# Patient Record
Sex: FEMALE | Race: WHITE | HISPANIC OR LATINO | Employment: OTHER | URBAN - METROPOLITAN AREA
[De-identification: names, ages, dates, MRNs, and addresses within clinical notes are randomized per-mention and may not be internally consistent; named-entity substitution may affect disease eponyms.]

---

## 2017-02-06 ENCOUNTER — ALLSCRIPTS OFFICE VISIT (OUTPATIENT)
Dept: OTHER | Facility: OTHER | Age: 47
End: 2017-02-06

## 2017-05-11 ENCOUNTER — GENERIC CONVERSION - ENCOUNTER (OUTPATIENT)
Dept: OTHER | Facility: OTHER | Age: 47
End: 2017-05-11

## 2017-07-27 ENCOUNTER — GENERIC CONVERSION - ENCOUNTER (OUTPATIENT)
Dept: OTHER | Facility: OTHER | Age: 47
End: 2017-07-27

## 2017-08-09 ENCOUNTER — APPOINTMENT (OUTPATIENT)
Dept: LAB | Facility: HOSPITAL | Age: 47
End: 2017-08-09
Payer: COMMERCIAL

## 2017-08-09 ENCOUNTER — TRANSCRIBE ORDERS (OUTPATIENT)
Dept: ADMINISTRATIVE | Facility: HOSPITAL | Age: 47
End: 2017-08-09

## 2017-08-09 DIAGNOSIS — Z51.81 ENCOUNTER FOR THERAPEUTIC DRUG MONITORING: Primary | ICD-10-CM

## 2017-08-09 DIAGNOSIS — Z51.81 ENCOUNTER FOR THERAPEUTIC DRUG MONITORING: ICD-10-CM

## 2017-08-09 LAB
ALBUMIN SERPL BCP-MCNC: 3.4 G/DL (ref 3.5–5)
ALP SERPL-CCNC: 52 U/L (ref 46–116)
ALT SERPL W P-5'-P-CCNC: 28 U/L (ref 12–78)
ANION GAP SERPL CALCULATED.3IONS-SCNC: 6 MMOL/L (ref 4–13)
AST SERPL W P-5'-P-CCNC: 20 U/L (ref 5–45)
BASOPHILS # BLD AUTO: 0.02 THOUSANDS/ΜL (ref 0–0.1)
BASOPHILS NFR BLD AUTO: 1 % (ref 0–1)
BILIRUB SERPL-MCNC: 0.45 MG/DL (ref 0.2–1)
BUN SERPL-MCNC: 21 MG/DL (ref 5–25)
CALCIUM SERPL-MCNC: 9 MG/DL (ref 8.3–10.1)
CHLORIDE SERPL-SCNC: 105 MMOL/L (ref 100–108)
CHOLEST SERPL-MCNC: 211 MG/DL (ref 50–200)
CO2 SERPL-SCNC: 30 MMOL/L (ref 21–32)
CREAT SERPL-MCNC: 0.94 MG/DL (ref 0.6–1.3)
EOSINOPHIL # BLD AUTO: 0.06 THOUSAND/ΜL (ref 0–0.61)
EOSINOPHIL NFR BLD AUTO: 2 % (ref 0–6)
ERYTHROCYTE [DISTWIDTH] IN BLOOD BY AUTOMATED COUNT: 14.7 % (ref 11.6–15.1)
GFR SERPL CREATININE-BSD FRML MDRD: 71 ML/MIN/1.73SQ M
GLUCOSE P FAST SERPL-MCNC: 85 MG/DL (ref 65–99)
HCT VFR BLD AUTO: 35.5 % (ref 34.8–46.1)
HDLC SERPL-MCNC: 76 MG/DL (ref 40–60)
HGB BLD-MCNC: 11.5 G/DL (ref 11.5–15.4)
LDLC SERPL CALC-MCNC: 127 MG/DL (ref 0–100)
LYMPHOCYTES # BLD AUTO: 0.9 THOUSANDS/ΜL (ref 0.6–4.47)
LYMPHOCYTES NFR BLD AUTO: 30 % (ref 14–44)
MCH RBC QN AUTO: 27.4 PG (ref 26.8–34.3)
MCHC RBC AUTO-ENTMCNC: 32.4 G/DL (ref 31.4–37.4)
MCV RBC AUTO: 85 FL (ref 82–98)
MONOCYTES # BLD AUTO: 0.36 THOUSAND/ΜL (ref 0.17–1.22)
MONOCYTES NFR BLD AUTO: 12 % (ref 4–12)
NEUTROPHILS # BLD AUTO: 1.63 THOUSANDS/ΜL (ref 1.85–7.62)
NEUTS SEG NFR BLD AUTO: 55 % (ref 43–75)
NRBC BLD AUTO-RTO: 0 /100 WBCS
PLATELET # BLD AUTO: 161 THOUSANDS/UL (ref 149–390)
PMV BLD AUTO: 11.5 FL (ref 8.9–12.7)
POTASSIUM SERPL-SCNC: 3.7 MMOL/L (ref 3.5–5.3)
PROT SERPL-MCNC: 7.3 G/DL (ref 6.4–8.2)
RBC # BLD AUTO: 4.2 MILLION/UL (ref 3.81–5.12)
SODIUM SERPL-SCNC: 141 MMOL/L (ref 136–145)
T3 SERPL-MCNC: 1 NG/ML (ref 0.6–1.8)
T4 FREE SERPL-MCNC: 1.04 NG/DL (ref 0.76–1.46)
TRIGL SERPL-MCNC: 39 MG/DL
TSH SERPL DL<=0.05 MIU/L-ACNC: 1.72 UIU/ML (ref 0.36–3.74)
WBC # BLD AUTO: 2.97 THOUSAND/UL (ref 4.31–10.16)

## 2017-08-09 PROCEDURE — 84443 ASSAY THYROID STIM HORMONE: CPT

## 2017-08-09 PROCEDURE — 80053 COMPREHEN METABOLIC PANEL: CPT

## 2017-08-09 PROCEDURE — 84480 ASSAY TRIIODOTHYRONINE (T3): CPT

## 2017-08-09 PROCEDURE — 85025 COMPLETE CBC W/AUTO DIFF WBC: CPT

## 2017-08-09 PROCEDURE — 80061 LIPID PANEL: CPT

## 2017-08-09 PROCEDURE — 84439 ASSAY OF FREE THYROXINE: CPT

## 2017-08-09 PROCEDURE — 36415 COLL VENOUS BLD VENIPUNCTURE: CPT

## 2017-08-30 ENCOUNTER — GENERIC CONVERSION - ENCOUNTER (OUTPATIENT)
Dept: OTHER | Facility: OTHER | Age: 47
End: 2017-08-30

## 2017-08-30 DIAGNOSIS — E78.5 HYPERLIPIDEMIA: ICD-10-CM

## 2017-08-30 DIAGNOSIS — D72.819 DECREASED WHITE BLOOD CELL COUNT: ICD-10-CM

## 2017-09-12 ENCOUNTER — GENERIC CONVERSION - ENCOUNTER (OUTPATIENT)
Dept: OTHER | Facility: OTHER | Age: 47
End: 2017-09-12

## 2017-09-28 ENCOUNTER — ALLSCRIPTS OFFICE VISIT (OUTPATIENT)
Dept: OTHER | Facility: OTHER | Age: 47
End: 2017-09-28

## 2017-10-27 NOTE — CONSULTS
Assessment  1  Abnormal CBC (790 6) (R79 89)   2  Leukopenia (288 50) (D72 819)    Plan  Abnormal CBC    · Follow-up visit in 4 Months Evaluation and Treatment  Follow-up  Status: Complete   Done: 82GED1417 03:30PM   Ordered; For: Abnormal CBC; Ordered By: Roland Muñoz) Performed:  Due: 42RPT6752; Last Updated By: Terri Stinson; 9/28/2017 4:10:48 PM  Acute sinusitis    · Benzonatate 100 MG Oral Capsule (Tessalon Perles)   Rx By: Darrion Orourke; Dispense: 10 Days ; #:30 Capsule; Refill: 0;For: Acute sinusitis; MORENO = N; Sent To: [facility]; Last Updated By: Judi Cox); 9/28/2017 4:05:58 PM  Leukopenia    · (1) CBC/PLT/DIFF; Status:Active; Requested ZKA:33PCC7707;    Perform:[facility]; HBR:82AOD9970; Ordered; For:Leukopenia; Ordered By:Omari Chou);   · (1) FERRITIN; Status:Active; Requested ZQN:15DUX3572;    Perform:[facility]; VEJ:05IGB3996; Ordered; For:Leukopenia; Ordered By:Omari Chou);   · (1) VITAMIN B12; Status:Active; Requested KCZ:34HUL1775;    Perform:[facility]; MANISHA:31GZM6880; Ordered; For:Leukopenia; Ordered By:Omari Chou); Unlinked    · Topiramate 50 MG Oral Tablet   Rx By: Boris Gleason; Dispense: 0 Days ; #: Sufficient Tablet; Refill: 0; MORENO = N; Record; Last Updated By: Roland Muñoz); 9/28/2017 4:05:58 PM    Discussion/Summary    Leukopenia isolated, 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, however she has microcytosis might be related also to iron deficiency anemia or vitamin deficiencypatient recently had hysterectomy for fibroid of the uterus, I suggest multivitamin with iron 1 tab p  o  daily and repeat CBC in 4 month, reviewing the electronic records she had intermittent leukopenia previouslyneed for additional workup or bone marrow biopsy at this time        Chief Complaint  Leukopenia      History of Present Illness  38-year-old  female and with history of anxiety, palpitation, uterine fibroid, status post hysterectomy and [address] [place] [address] in August 2017, no evidence of malignancy, the patient was found to have leukopeniaAugust 2017 WBC 2 97, hemoglobin 11 5, MCV 85, RDW 14 7, platelets [id number], 86% neutrophils, 30% lymphocytesJuly 2016 WBC 4 1, hemoglobin 12 2, MCV 89, platelets 831 7, 01% neutrophils, 34% lymphocytesDecember 2014 WBC 3 7, hemoglobin 12 2, MCV 91, platelets [id number], 31% neutrophils, 30% lymphocytes, 19% monocyteshas normal CMP, TSHdoes not smoke or drinkhistory father with prostate cancer      Review of Systems    Constitutional: no fever,-- not feeling poorly-- and-- not feeling tired  Eyes: No complaints of eye pain, no red eyes, no eyesight problems, no discharge, no dry eyes, no itching of eyes  ENT: no complaints of earache, no loss of hearing, no nose bleeds, no nasal discharge, no sore throat, no hoarseness  Cardiovascular: the heart rate was not slow,-- no chest pain-- and-- no palpitations  Respiratory: No complaints of shortness of breath, no wheezing, no cough, no SOB on exertion, no orthopnea, no PND  Gastrointestinal: No complaints of abdominal pain, no constipation, no nausea or vomiting, no diarrhea, no bloody stools  Genitourinary: No complaints of dysuria, no incontinence, no pelvic pain, no dysmenorrhea, no vaginal discharge or bleeding  Musculoskeletal: no arthralgias,-- no joint swelling-- and-- no joint stiffness  Integumentary: No complaints of skin rash or lesions, no itching, no skin wounds, no breast pain or lump  Neurological: No complaints of headache, no confusion, no convulsions, no numbness, no dizziness or fainting, no tingling, no limb weakness, no difficulty walking  Psychiatric: Not suicidal, no sleep disturbance, no anxiety or depression, no change in personality, no emotional problems     Endocrine: No complaints of proptosis, no hot flashes, no muscle weakness, no deepening of the voice, no feelings of weakness  Hematologic/Lymphatic: no swollen glands,-- no tendency for easy bleeding-- and-- no swollen glands in the neck  ROS reviewed  Active Problems  1  Abdominal mass, LLQ (left lower quadrant) (789 34) (R19 04)   2  Abnormal CBC (790 6) (R79 89)   3  Abnormal uterine bleeding (AUB) (626 9) (N93 9)   4  Acute sinusitis (461 9) (J01 90)   5  Anxiety (300 00) (F41 9)   6  Chest tightness (786 59) (R07 89)   7  Chronic neck pain (723 1,338 29) (M54 2,G89 29)   8  Chronic upper back pain (724 5,338 29) (M54 9,G89 29)   9  Constipation (564 00) (K59 00)   10  Encounter for routine gynecological examination with Papanicolaou smear of cervix    (V72 31,V76 2) (Z01 419)   11  Epigastric pain (789 06) (R10 13)   12  Headache (784 0) (R51)   13  Heart palpitations (785 1) (R00 2)   14  Herpes simplex infection (054 9) (B00 9)   15  Hyperlipidemia (272 4) (E78 5)   16  Leukopenia (288 50) (D72 819)   17  Denied: History of Mental disorder   18  Metatarsalgia (726 70) (M77 40)   19  Mild vitamin D deficiency (268 9) (E55 9)   20  Nocturnal leg cramps (327 52) (G47 62)   21  Osteoarthritis (715 90) (M19 90)   22  Ovarian cyst, complex (620 2) (N83 299)   23  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)   24  Rectal bleeding (569 3) (K62 5)   25  Rectal pain (569 42) (K62 89)   26  Screening examination for pulmonary tuberculosis (V74 1) (Z11 1)   27  Sprain, neck (847 0) (S13 9XXA)   28  Urinary urgency (788 63) (R39 15)   29  Visit for screening mammogram (V76 12) (Z12 31)    Past Medical History  1  History of Ear itching (698 9) (L29 9)   2  History of candidal vulvovaginitis (V13 29) (Z86 19)   3  History of common cold (V12 09) (Z86 19)   4  History of hypertension (V12 59) (Z86 79)   5  Denied: History of Mental disorder   6  History of Toe pain (729 5) (M79 172)    The active problems and past medical history were reviewed and updated today  Surgical History  1   History of  Section Low Transverse   2  History of Laparoscopy With Total Hysterectomy   3  History of Laryngeal Surgery   4  History of Tubal Ligation    The surgical history was reviewed and updated today  Family History  Mother    1  Family history of hypertension (V17 49) (Z82 49)    The family history was reviewed and updated today  Social History   · Denied: History of Drug use   · Never a smoker   · No alcohol use   · No preference on Sikh beliefs  The social history was reviewed and updated today  Current Meds   1  Atorvastatin Calcium 20 MG Oral Tablet; TAKE 1 TABLET AT BEDTIME; Therapy: 77Eue7944 to (Evaluate:12Jal4076)  Requested for: 76Ufc2379; Last   Rx:87Xai5691 Ordered   2  Baclofen 10 MG Oral Tablet; TAKE 1 TABLET 3 TIMES DAILY AS NEEDED FOR MUSCLE   SPASM; Therapy: 71Lrg1425 to (Evaluate:40Zgi7716)  Requested for: 95Yut7620; Last   Rx:53Aom9246 Ordered   3  Benzonatate 100 MG Oral Capsule; TAKE 1 CAPSULE 3 TIMES DAILY AS NEEDED; Therapy: 13ECH5814 to (Evaluate:51Oun2575)  Requested for: 91TPC6210; Last   Rx:24Vfj2821 Ordered   4  Citalopram Hydrobromide 20 MG Oral Tablet; Therapy: 46Qqp9023 to Recorded   5  DocQLace 100 MG Oral Capsule; take 1 capsule by mouth twice a day if needed for   constipation; Therapy: 95Dop6727 to (Evaluate:13Xos9930)  Requested for: 47SRN5033; Last   Rx:92Xft8071 Ordered   6  Docusate Sodium 100 MG Oral Capsule; TAKE 1 CAPSULE TWICE DAILY as needed for   constipation; Therapy: 62BHB7358 to (Evaluate:12Jan2017)  Requested for: 35TZJ1556; Last   Rx:14Oct2016 Ordered   7  Omeprazole 20 MG Oral Capsule Delayed Release; TAKE 1 CAPSULE Daily; Therapy: 21IWN9153 to (Evaluate:78Yqe3816)  Requested for: 14Oct2016; Last   Rx:14Oct2016 Ordered   8  Oxybutynin Chloride ER 10 MG Oral Tablet Extended Release 24 Hour; Take 1 tablet   daily; Therapy: 25XYY0504 to (Evaluate:31Ifq9745)  Requested for: 32WWF6340; Last   Rx:48Njm2205 Ordered   9   Phentermine HCl - 37 5 MG Oral Capsule; Therapy: 66ZHE2819 to Recorded   10  RA Stool Softener 100 MG Oral Capsule; take 1 capsule by mouth twice a day if needed    for constipation; Therapy: 09KNY1046 to (Evaluate:23Apr2017)  Requested for: 53EQJ1873; Last    Rx:24Mar2017 Ordered   11  Topiramate 50 MG Oral Tablet; Therapy: 27VUJ1803 to Recorded   12  ValACYclovir HCl - 500 MG Oral Tablet; TAKE 1 TABLET TWICE DAILY; Therapy: 92IEG9199 to (Evaluate:02Jun2017)  Requested for: 92HIM5218; Last    Rx:34Ptp8770 Ordered   13  Vitamin D3 5000 UNIT Oral Capsule; TAKE 1 CAPSULE BY MOUTH EVERY DAY; Therapy: 12XVS4476 to (Evaluate:04Avk1405)  Requested for: 02Pan5920; Last    Rx:92Kec5843 Ordered    The medication list was reviewed and updated today  Allergies  1  No Known Drug Allergies  2  No Known Environmental Allergies   3  No Known Food Allergies    Vitals  Vital Signs    Recorded: 09FWE0486 03:56PM   Temperature 98 6 F   Heart Rate 60   Respiration 16   Height 5 ft 2 in   Weight 155 lb    BMI Calculated 28 35   BSA Calculated 1 72   O2 Saturation 98     Physical Exam    Constitutional   General appearance: No acute distress, well appearing and well nourished  Eyes   Conjunctiva and lids: No swelling, erythema or discharge  Pupils and irises: Equal, round and reactive to light  Ears, Nose, Mouth, and Throat   External inspection of ears and nose: Normal     Oropharynx: Normal with no erythema, edema, exudate or lesions  Pulmonary   Auscultation of lungs: Clear to auscultation  Cardiovascular   Auscultation of heart: Normal rate and rhythm, normal S1 and S2, without murmurs  Examination of extremities for edema and/or varicosities: Normal     Carotid pulses: Normal     Abdomen   Abdomen: Non-tender, no masses  Liver and spleen: No hepatomegaly or splenomegaly  Lymphatic   Palpation of lymph nodes in neck: No lymphadenopathy      Musculoskeletal   Gait and station: Normal     Inspection/palpation of joints, bones, and muscles: Normal     Skin   Skin and subcutaneous tissue: Normal without rashes or lesions  Neurologic   Cranial nerves: Cranial nerves 2-12 intact  Sensation: No sensory loss  Psychiatric   Orientation to person, place, and time: Normal     Mood and affect: Normal         ECOG 0       Results/Data  (1) CBC/PLT/DIFF 14QMT4904 08:28AM EPIC, Provider   Test ordered by: Ronnie Dye     Test Name Result Flag Reference   WBC COUNT 2 97 Thousand/uL L 4 31-10 16   RBC COUNT 4 20 Million/uL  3 81-5 12   HEMOGLOBIN 11 5 g/dL  11 5-15 4   HEMATOCRIT 35 5 %  34 8-46  1   MCV 85 fL  82-98   MCH 27 4 pg  26 8-34 3   MCHC 32 4 g/dL  31 4-37 4   RDW 14 7 %  11 6-15 1   MPV 11 5 fL  8 9-12 7   PLATELET COUNT 547 Thousands/uL  149-390   nRBC AUTOMATED 0 /100 WBCs     NEUTROPHILS RELATIVE PERCENT 55 %  43-75   LYMPHOCYTES RELATIVE PERCENT 30 %  14-44   MONOCYTES RELATIVE PERCENT 12 %  4-12   EOSINOPHILS RELATIVE PERCENT 2 %  0-6   BASOPHILS RELATIVE PERCENT 1 %  0-1   NEUTROPHILS ABSOLUTE COUNT 1 63 Thousands/? ??L L 1 85-7 62   LYMPHOCYTES ABSOLUTE COUNT 0 90 Thousands/? ??L  0 60-4 47   MONOCYTES ABSOLUTE COUNT 0 36 Thousand/? ??L  0 17-1 22   EOSINOPHILS ABSOLUTE COUNT 0 06 Thousand/? ??L  0 00-0 61   BASOPHILS ABSOLUTE COUNT 0 02 Thousands/? ??L  0 00-0 10   This bloodwork is non-fasting  Please drink two glasses of water morning of  bloodwork       (1) COMPREHENSIVE METABOLIC PANEL 12EQQ6744 44:37HP EPIC, Provider   Test ordered by: Ronnie Dye     Test Name Result Flag Reference   SODIUM 141 mmol/L  136-145   POTASSIUM 3 7 mmol/L  3 5-5 3   CHLORIDE 105 mmol/L  100-108   CARBON DIOXIDE 30 mmol/L  21-32   ANION GAP (CALC) 6 mmol/L  4-13   BLOOD UREA NITROGEN 21 mg/dL  5-25   CREATININE 0 94 mg/dL  0 60-1 30   Standardized to IDMS reference method   CALCIUM 9 0 mg/dL  8 3-10 1   BILI, TOTAL 0 45 mg/dL  0 20-1 00   ALK PHOSPHATAS 52 U/L     ALT (SGPT) 28 U/L  12-78   Specimen collection should occur prior to Sulfasalazine administration due to the potential for falsely depressed results  AST(SGOT) 20 U/L  5-45   Specimen collection should occur prior to Sulfasalazine administration due to the potential for falsely depressed results  ALBUMIN 3 4 g/dL L 3 5-5 0   TOTAL PROTEIN 7 3 g/dL  6 4-8 2   eGFR 71 ml/min/1 73sq m     National Kidney Disease Education Program recommendations are as follows:  GFR calculation is accurate only with a steady state creatinine  Chronic Kidney disease less than 60 ml/min/1 73 sq  meters  Kidney failure less than 15 ml/min/1 73 sq  meters  GLUCOSE FASTING 85 mg/dL  65-99   Specimen collection should occur prior to Sulfasalazine administration due to the potential for falsely depressed results  Specimen collection should occur prior to Sulfapyridine administration due to the potential for falsely elevated results  Future Appointments    Date/Time Provider Specialty Site   01/25/2018 03:30 PM JOCELINE Nunn  Hematology Oncology [facility]     Signatures   Electronically signed by :  JOCELINE Chandra ; Sep 28 2017  4:48PM EST                       (Author)

## 2018-01-12 NOTE — PROCEDURES
Procedures signed by Sincere Shook MD at 6/12/2016  2:43 PM       Author:  Sincere Shook MD Service:  Cardiology Author Type:  Physician     Filed:  6/12/2016  2:43 PM Date of Service:  6/9/2016  9:34 AM Status:  Signed     :  Sincere Shook MD (Physician)              [place], [place]  [id number]  [facility] MONITOR  06/06/2016    Ordering Physician  Liam Benz MD    Indication  Tachycardia  24 hour Holter     Predominant rhythm is sinus rhythm  Average heart rate 83 beats per   minute  Minimum heart rate of 60 beats per minute, maximum heart rate   of 126 beats per minute  There were rare isolated ventricular ectopic   beats noted  No ventricular tachycardia noted  There were rare   supraventricular ectopic beats noted  No supraventricular tachycardia   noted  No heart block or pauses exceeding 1 2 seconds  The patient had coughing on one occasion which correlated with sinus   rhythm at a rate of 84 beats per minute  se  [id number]  D:  06/09/2016 09:24:00  Dictated by:  Sincere Shook MD  T:  06/09/2016 09:34:24    CC:  Krishan Burton MD           Received Jennifer CLARK    Jun 12 2016  2:42PM [facility] Standard Time

## 2018-01-13 NOTE — PROGRESS NOTES
Plan  Abnormal uterine bleeding (AUB)    · Norethindrone 0 35 MG Oral Tablet; TAKE 1 TABLET DAILY   Rx By: Prieto Zaidi; Dispense: 28 Days ; #:1 X 28 Tablet Disp Pack; Refill: 3; For: Abnormal uterine bleeding (AUB); MORENO = N; Verified Transmission to [facility]  [facility] ANGEL [facility]; Last Updated By: System, [employer]; 2016 9:51:55 AM   · Urine HCG- POC; Status:Active - Perform Order; Requested for:49Qkz5826;    Perform: In Office; HNR:44KPZ0923;CTEMFNS; Today; For:Abnormal uterine bleeding (AUB); Ordered By:Francesca Saldaña [facility];  Urinary urgency    · Urine Dip Non-Automated- POC; Status:Active - Perform Order; Requested  for:00Flf3968;    Perform: In Office; [facility]68MNU6930[facility]; For:Urinary urgency; Ordered By:Francesca Saldaña [facility];    Discussion/Summary  Discussion Summary:   47 yo  presents for follow up of AUB  Unrelieved by home remedies  SIS done in office today did not demonstrate evidence of polyp    1  AUB / pelvic pain  - Noethindrone 0 35mg PO daily   - RTC 3 mo for reevalution    2  Urinary urgency - no evidence of infection, hx of "urethra surgeries"  - Follow up 1-2 wks in [address] [facility] [place]  Chief Complaint  Chief Complaint Free Text Note Form: Patient is c/o lower abdominal pain and heavy periods  History of Present Illness  HPI: 47 yo  presents for follow up of AUB    Patient reports her pain and bleeding have worsened over the last year  Reports that bleeding occurs every 28 days, lasts 10 days  Notes that during her menses, her bleeding is particularly heavy and she bleeds through 4-5 pads/day and finds herself staining clothes and sheets as it leaks  Reports she also gets pelvic pain, occurs most of the month and is not limited to when she has her menses  Pain is aching/cramping, bilateral lower quadrant  Also has burning sensation on labia, but this is similar to sensation when she is getting a herpes outbreak   Since last seeing us, she has tried a pineapple/cucumber/beet juice that is supposed to help as a diuretic  Notes that it did not help  Patient also notes urinary frequency  Notes she voids, but then needs to sit on toilet for several minutes because "some more needs to come out " Also notes urgency, and reports if she doesn't get to the bathroom right away she leaks a small amount  She notes previously she needed two procedures to "open her urethra " One occurred in [place], the other in [place]  Review of Systems   Female ROS: nonmenstrual bleeding, dysmenorrhea, menorrhagia, urinary frequency, feelings of urinary urgency, incomplete emptying of bladder and initiating urination requires straining, but no pelvic pain, no pelvic pressure, no vaginal pain, no vaginal discharge, no vaginal itching, no vaginal odor, no vulvar pain, no vulvar itching, periods are regular, no dysuria, no bladder pain, no hematuria, no burning sensation during urination, no flank pain and no abnormal urethral discharge  Focused-Female:   Constitutional: no fever and no chills  Cardiovascular: no chest pain and no palpitations  Respiratory: no shortness of breath  Gastrointestinal: abdominal pain, but no nausea, no vomiting, no constipation and no diarrhea  Genitourinary: dysmenorrhea and unexplained vaginal bleeding, but no dysuria, no pelvic pain and no vaginal discharge  ROS Reviewed:   ROS reviewed  Active Problems    1  Abdominal mass, LLQ (left lower quadrant) (789 34) (R19 04)   2  Abnormal uterine bleeding (AUB) (626 9) (N93 9)   3  Chest tightness (786 59) (R07 89)   4  Common cold (460) (J00)   5  Ear itching (698 9) (L29 9)   6  Encounter for routine gynecological examination with Papanicolaou smear of cervix   (V72 31,V76 2) (Z01 419)   7  Headache (784 0) (R51)   8  Heart palpitations (785 1) (R00 2)   9  Herpes simplex infection (054 9) (B00 9)   10  Hyperlipidemia (272 4) (E78 5)   11  Hypertension (401 9) (I10)   12  Denied: History of Mental disorder   13  Metatarsalgia (726 70) (M77 40)   14  Mild vitamin D deficiency (268 9) (E55 9)   15  Nocturnal leg cramps (327 52) (G47 62)   16  Osteoarthritis (715 90) (M19 90)   17  Ovarian cyst, complex (620 2) (N83 20)   18  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)   19  Screening examination for pulmonary tuberculosis (V74 1) (Z11 1)   20  Sprain, neck (847 0) (S13 9XXA)   21  Toe pain (729 5) (M79 676)   22  Visit for screening mammogram (V76 12) (Z12 31)    Past Medical History    1  Denied: History of Mental disorder  Active Problems And Past Medical History Reviewed: The active problems and past medical history were reviewed and updated today  Surgical History    1  History of  Section Low Transverse   2  History of Laryngeal Surgery   3  History of Tubal Ligation  Surgical History Reviewed: The surgical history was reviewed and updated today  Family History  Mother    1  Family history of hypertension (V17 49) (Z82 49)  Family History Reviewed: The family history was reviewed and updated today  Social History    · Denied: History of Drug use   · Never a smoker   · No preference on Confucianist beliefs  Social History Reviewed: The social history was reviewed and updated today  Current Meds   1  Omega-3 Fish Oil 1000 MG Oral Capsule; TAKE 1 CAPSULE DAILY; Therapy: 57Koa6856 to Recorded   2  ValACYclovir HCl - 500 MG Oral Tablet; TAKE 1 TABLET DAILY; Therapy: 61ULY7004 to (Evaluate:35Aqq7748)  Requested for: 75LNP0117; Last   Rx:30Lpv2165 Ordered   3  Vitamin D3 5000 UNIT Oral Capsule; TAKE 1 CAPSULE BY MOUTH EVERY DAY; Therapy: 25ZJJ1376 to 96 934734)  Requested for: 70LNQ7838; Last   Rx:46Oet6475 Ordered  Medication List Reviewed: The medication list was reviewed and updated today  Allergies    1   No Known Drug Allergies    Vitals  Vital Signs    Recorded: 14SXD3062 88:57AE   Systolic 641   Diastolic 79   LMP 03QMR5390   Weight 153 lb    BMI Calculated 27 98   BSA Calculated 1 71     Physical Exam    Constitutional   General appearance: No acute distress, well appearing and well nourished  Pulmonary   Respiratory effort: No increased work of breathing or signs of respiratory distress  Genitourinary   External genitalia: Normal and no lesions appreciated  normal labia majora and minora, no erythema  Vagina: Abnormal   scant blood in vaginal vault  Cervix: Normal, no palpable masses  no CMT, visually closed  Uterus: Normal, non-tender, not enlarged, and no palpable masses  small and mobile, normal in size and consistency  Adnexa/parametria: Normal, non-tender and no fullness or masses appreciated  Psychiatric   Orientation to person, place, and time: Normal     Mood and affect: Normal        Procedure    Procedure: Transvaginal Saline Hysterosonography (SIS)  The study was done today in the office  Indication: Heavy Vaginal Bleeding  Risks, benefits and alternatives were discussed with the patient  We discussed possible complications, including infection, bleeding and allergic reaction  Written consent was obtained prior to the procedure  Procedure Note:   Patient placed in dorsal lithotomy position with legs in stirrups  Cervix cleansed with betadine  Saline infusion catheter inserted into endometrial cavity  10cc syringe with sterile water connected to catheter  Approximately 2 - 8 cc infused into endometrial cavity  Findings:  smooth endometrium  Patient Status: the patient tolerated the procedure well  Complications:  there were no complications        Future Appointments    Date/Time Provider Specialty Site   11/23/2016 02:00 PM [facility], [address] [facility] [place]   12/08/2016 03:00 PM [facility], [address]     Signatures   Electronically signed by : JOCELINE Caro ; Aug 18 2016  3:31PM EST                       (Author)    Electronically signed by : Mona Holly MD; Sep  8 2016  9:45AM EST

## 2018-01-14 VITALS
DIASTOLIC BLOOD PRESSURE: 70 MMHG | HEART RATE: 76 BPM | BODY MASS INDEX: 30.03 KG/M2 | TEMPERATURE: 97.5 F | OXYGEN SATURATION: 99 % | RESPIRATION RATE: 18 BRPM | HEIGHT: 62 IN | WEIGHT: 163.19 LBS | SYSTOLIC BLOOD PRESSURE: 140 MMHG

## 2018-01-14 VITALS
HEART RATE: 60 BPM | HEIGHT: 62 IN | RESPIRATION RATE: 16 BRPM | OXYGEN SATURATION: 98 % | TEMPERATURE: 98.6 F | WEIGHT: 155 LBS | BODY MASS INDEX: 28.52 KG/M2

## 2018-01-15 NOTE — PROGRESS NOTES
Chief Complaint  Patient here for blood pressure check  Active Problems    1  Abdominal mass, LLQ (left lower quadrant) (789 34) (R19 04)   2  Abnormal uterine bleeding (AUB) (626 9) (N93 9)   3  Chest tightness (786 59) (R07 89)   4  Common cold (460) (J00)   5  Ear itching (698 9) (L29 9)   6  Encounter for routine gynecological examination with Papanicolaou smear of cervix   (V72 31,V76 2) (Z01 419)   7  Headache (784 0) (R51)   8  Heart palpitations (785 1) (R00 2)   9  Herpes simplex infection (054 9) (B00 9)   10  Hyperlipidemia (272 4) (E78 5)   11  Hypertension (401 9) (I10)   12  Denied: History of Mental disorder   13  Metatarsalgia (726 70) (M77 40)   14  Mild vitamin D deficiency (268 9) (E55 9)   15  Nocturnal leg cramps (327 52) (G47 62)   16  Osteoarthritis (715 90) (M19 90)   17  Ovarian cyst, complex (620 2) (N83 20)   18  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)   19  Screening examination for pulmonary tuberculosis (V74 1) (Z11 1)   20  Sprain, neck (847 0) (S13 9XXA)   21  Toe pain (729 5) (M79 676)   22  Visit for screening mammogram (V76 12) (Z12 31)    Current Meds   1  Omega-3 Fish Oil 1000 MG Oral Capsule; TAKE 1 CAPSULE DAILY; Therapy: 78Yvu1495 to Recorded   2  ValACYclovir HCl - 500 MG Oral Tablet; TAKE 1 TABLET DAILY; Therapy: 48MTN0655 to (Evaluate:24Hju2333)  Requested for: 68IAR6136; Last   Rx:95Qeq9821 Ordered   3  Vitamin D3 5000 UNIT Oral Capsule; TAKE 1 CAPSULE BY MOUTH EVERY DAY; Therapy: 31PVZ7650 to (Evaluate:57Irz1978)  Requested for: 06EQQ6379; Last   Rx:94Uvx1397 Ordered    Allergies    1  No Known Drug Allergies    Discussion/Summary    Patient was taking lisinopril-HCTZ, but discontinued it due to blood pressure getting so low  Blood pressure is stable today at 116/80 12:42pm  I advised pt to schedule a follow up as needed per Dr Charles Villanueva        Future Appointments    Date/Time Provider Specialty Site   08/18/2016 09:00 AM Ernestina Burton 171, Physician Schedule  [facility]     Signatures   Electronically signed by : Mk Diez, ; Aug  3 2016  1:00PM EST                       (Author)    Electronically signed by : JOCELINE Keen ; Aug  3 2016  1:09PM EST                       (Author)

## 2018-01-15 NOTE — RESULT NOTES
Verified Results  (1) COMPREHENSIVE METABOLIC PANEL [id number] 03:63ZN Johana Davies   National Kidney Disease Education Program recommendations are as follows:  GFR calculation is accurate only with a steady state creatinine  Chronic Kidney disease less than 60 ml/min/1 73 sq  meters  Kidney failure less than 15 ml/min/1 73 sq  meters  Test Name Result Flag Reference   GLUCOSE,RANDM 88 mg/dL     If the patient is fasting, the ADA then defines impaired fasting glucose as > 100 mg/dL and diabetes as > or equal to 123 mg/dL  SODIUM 140 mmol/L  136-145   POTASSIUM 3 7 mmol/L  3 5-5 3   CHLORIDE 103 mmol/L  100-108   CARBON DIOXIDE 33 mmol/L H 21-32   ANION GAP (CALC) 4 mmol/L  4-13   BLOOD UREA NITROGEN 15 mg/dL  5-25   CREATININE 0 91 mg/dL  0 60-1 30   Standardized to IDMS reference method   CALCIUM 9 1 mg/dL  8 3-10 1   BILI, TOTAL 0 83 mg/dL  0 20-1 00   ALK PHOSPHATAS 39 U/L L    ALT (SGPT) 29 U/L  12-78   AST(SGOT) 17 U/L  5-45   ALBUMIN 3 3 g/dL L 3 5-5 0   TOTAL PROTEIN 7 0 g/dL  6 4-8 2   eGFR Non-African American      >60 0 ml/min/1 73sq m     (1) LIPID PANEL, FASTING [id number] 07:49AM Johana Davies   Triglyceride:         Normal              <150 mg/dl       Borderline High    150-199 mg/dl       High               200-499 mg/dl       Very High          >499 mg/dl  Cholesterol:         Desirable        <200 mg/dl      Borderline High  200-239 mg/dl      High             >239 mg/dl  HDL Cholesterol:        High    >59 mg/dL      Low     <41 mg/dL     Test Name Result Flag Reference   CHOLESTEROL 221 mg/dL H    HDL,DIRECT 82 mg/dL H 40-60   Specimen collection should occur prior to Metamizole administration due to the potential for falsely depressed results  LDL CHOLESTEROL CALCULATED 129 mg/dL H 0-100   TRIGLYCERIDES 51 mg/dL  <=150   Specimen collection should occur prior to N-Acetylcysteine or Metamizole administration due to the potential for falsely depressed results       (1) TSH WITH FT4 REFLEX [id number] 07:49AM Johana Davies   Patients undergoing fluorescein dye angiography may retain small amounts of fluorescein in the body for 48-72 hours post procedure  Samples containing fluorescein can produce falsely depressed TSH values  If the patient had this procedure,a specimen should be resubmitted post fluorescein clearance          The recommended reference ranges for TSH during pregnancy are as follows:  First trimester 0 1 to 2 5 uIU/mL  Second trimester  0 2 to 3 0 uIU/mL  Third trimester 0 3 to 3 0 uIU/m     Test Name Result Flag Reference   TSH 2 367 uIU/mL  0 358-3 740     (1) VITAMIN D 25-HYDROXY [id number] 07:49AM Johana Davies     Test Name Result Flag Reference   VIT D 25-HYDROX 22 5 ng/mL L 30 0-100 0     (1) MICROALBUMIN CREATININE RATIO, RANDOM URINE [id number] 07:49AM Johana Davies     Test Name Result Flag Reference   MICROALBUMIN/ CREAT R 3 mg/g creatinine  0-30   MICROALBUMIN,URINE 9 2 mg/L  0 0-20 0   CREATININE URINE 297 0 mg/dL         Plan  Mild vitamin D deficiency    · Vitamin D3 2000 UNIT Oral Tablet   · Vitamin D3 5000 UNIT Oral Capsule; TAKE 1 CAPSULE BY MOUTH EVERY DAY

## 2018-01-17 NOTE — CONSULTS
Chief Complaint  Leukopenia      History of Present Illness  68-year-old  female and with history of anxiety, palpitation, uterine fibroid, status post hysterectomy and [address] [place] [address] in August 2017, no evidence of malignancy, the patient was found to have leukopenia  in August 2017 WBC 2 97, hemoglobin 11 5, MCV 85, RDW 14 7, platelets 199015, 91% neutrophils, 30% lymphocytes  In July 2016 WBC 4 1, hemoglobin 12 2, MCV 89, platelets 921 7, 51% neutrophils, 34% lymphocytes  In December 2014 WBC 3 7, hemoglobin 12 2, MCV 91, platelets 994286, 68% neutrophils, 30% lymphocytes, 19% monocytes  She has normal CMP, TSH  she does not smoke or drink  family history father with prostate cancer      Review of Systems    Constitutional: no fever, not feeling poorly and not feeling tired  Eyes: No complaints of eye pain, no red eyes, no eyesight problems, no discharge, no dry eyes, no itching of eyes  ENT: no complaints of earache, no loss of hearing, no nose bleeds, no nasal discharge, no sore throat, no hoarseness  Cardiovascular: the heart rate was not slow, no chest pain and no palpitations  Respiratory: No complaints of shortness of breath, no wheezing, no cough, no SOB on exertion, no orthopnea, no PND  Gastrointestinal: No complaints of abdominal pain, no constipation, no nausea or vomiting, no diarrhea, no bloody stools  Genitourinary: No complaints of dysuria, no incontinence, no pelvic pain, no dysmenorrhea, no vaginal discharge or bleeding  Musculoskeletal: no arthralgias, no joint swelling and no joint stiffness  Integumentary: No complaints of skin rash or lesions, no itching, no skin wounds, no breast pain or lump  Neurological: No complaints of headache, no confusion, no convulsions, no numbness, no dizziness or fainting, no tingling, no limb weakness, no difficulty walking     Psychiatric: Not suicidal, no sleep disturbance, no anxiety or depression, no change in personality, no emotional problems  Endocrine: No complaints of proptosis, no hot flashes, no muscle weakness, no deepening of the voice, no feelings of weakness  Hematologic/Lymphatic: no swollen glands, no tendency for easy bleeding and no swollen glands in the neck  ROS reviewed  Active Problems    1  Abdominal mass, LLQ (left lower quadrant) (789 34) (R19 04)   2  Abnormal CBC (790 6) (R79 89)   3  Abnormal uterine bleeding (AUB) (626 9) (N93 9)   4  Acute sinusitis (461 9) (J01 90)   5  Anxiety (300 00) (F41 9)   6  Chest tightness (786 59) (R07 89)   7  Chronic neck pain (723 1,338 29) (M54 2,G89 29)   8  Chronic upper back pain (724 5,338 29) (M54 9,G89 29)   9  Constipation (564 00) (K59 00)   10  Encounter for routine gynecological examination with Papanicolaou smear of cervix    (V72 31,V76 2) (Z01 419)   11  Epigastric pain (789 06) (R10 13)   12  Headache (784 0) (R51)   13  Heart palpitations (785 1) (R00 2)   14  Herpes simplex infection (054 9) (B00 9)   15  Hyperlipidemia (272 4) (E78 5)   16  Leukopenia (288 50) (D72 819)   17  Denied: History of Mental disorder   18  Metatarsalgia (726 70) (M77 40)   19  Mild vitamin D deficiency (268 9) (E55 9)   20  Nocturnal leg cramps (327 52) (G47 62)   21  Osteoarthritis (715 90) (M19 90)   22  Ovarian cyst, complex (620 2) (N83 299)   23  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)   24  Rectal bleeding (569 3) (K62 5)   25  Rectal pain (569 42) (K62 89)   26  Screening examination for pulmonary tuberculosis (V74 1) (Z11 1)   27  Sprain, neck (847 0) (S13 9XXA)   28  Urinary urgency (788 63) (R39 15)   29   Visit for screening mammogram (V76 12) (Z12 31)    Past Medical History    · History of Ear itching (698 9) (L29 9)   · History of candidal vulvovaginitis (V13 29) (Z86 19)   · History of common cold (V12 09) (Z86 19)   · History of hypertension (V12 59) (Z86 79)   · Denied: History of Mental disorder   · History of Toe pain (729 5) (L37 103)    The active problems and past medical history were reviewed and updated today  Surgical History    · History of  Section Low Transverse   · History of Laparoscopy With Total Hysterectomy   · History of Laryngeal Surgery   · History of Tubal Ligation    The surgical history was reviewed and updated today  Family History    · Family history of hypertension (V17 49) (Z82 49)    The family history was reviewed and updated today  Social History    · Denied: History of Drug use   · Never a smoker   · No alcohol use   · No preference on [employer] beliefs  The social history was reviewed and updated today  Current Meds   1  Atorvastatin Calcium 20 MG Oral Tablet; TAKE 1 TABLET AT BEDTIME; Therapy: 32Xbc3930 to (Evaluate:36Mvb1620)  Requested for: 31Rpo9218; Last   Rx:61Ubj4547 Ordered   2  Baclofen 10 MG Oral Tablet; TAKE 1 TABLET 3 TIMES DAILY AS NEEDED FOR MUSCLE   SPASM; Therapy: 22Rbw6757 to (Evaluate:50Kka2765)  Requested for: 84Frv5017; Last   Rx:61Ynm0426 Ordered   3  Benzonatate 100 MG Oral Capsule; TAKE 1 CAPSULE 3 TIMES DAILY AS NEEDED; Therapy: 18BXQ3429 to (Evaluate:12Wbq7287)  Requested for: 79SER8506; Last   Rx:51Sno7851 Ordered   4  Citalopram Hydrobromide 20 MG Oral Tablet; Therapy: 13Uqn2986 to Recorded   5  DocQLace 100 MG Oral Capsule; take 1 capsule by mouth twice a day if needed for   constipation; Therapy: 02Cns8966 to (Evaluate:67Nod9678)  Requested for: 52XUC7561; Last   Rx:12Ftl3131 Ordered   6  Docusate Sodium 100 MG Oral Capsule; TAKE 1 CAPSULE TWICE DAILY as needed for   constipation; Therapy: 16GZE2544 to (Evaluate:2017)  Requested for: 16PLO1150; Last   Rx:21Mjj4604 Ordered   7  Omeprazole 20 MG Oral Capsule Delayed Release; TAKE 1 CAPSULE Daily; Therapy: 03OLB5595 to (Evaluate:55Cdm1418)  Requested for: 2016; Last   Rx:2016 Ordered   8  Oxybutynin Chloride ER 10 MG Oral Tablet Extended Release 24 Hour;  Take 1 tablet daily; Therapy: 79RRO8770 to (Evaluate:93Nsk6104)  Requested for: 21BMP9174; Last   Rx:08Mlb9625 Ordered   9  Phentermine HCl - 37 5 MG Oral Capsule; Therapy: 87EYT3795 to Recorded   10  RA Stool Softener 100 MG Oral Capsule; take 1 capsule by mouth twice a day if needed    for constipation; Therapy: 32WLR5132 to (Evaluate:23Apr2017)  Requested for: 05FIU8533; Last    Rx:69Api5644 Ordered   11  Topiramate 50 MG Oral Tablet; Therapy: 01KIR1899 to Recorded   12  ValACYclovir HCl - 500 MG Oral Tablet; TAKE 1 TABLET TWICE DAILY; Therapy: 78ABQ3349 to (Evaluate:02Jun2017)  Requested for: 93YRU8528; Last    Rx:33Yqf0221 Ordered   13  Vitamin D3 5000 UNIT Oral Capsule; TAKE 1 CAPSULE BY MOUTH EVERY DAY; Therapy: 45IVP1593 to (Evaluate:11Fwi2177)  Requested for: 06Aee5253; Last    Rx:47Wxy6279 Ordered    The medication list was reviewed and updated today  Allergies    1  No Known Drug Allergies    2  No Known Environmental Allergies   3  No Known Food Allergies    Vitals   Recorded: 75RZP6800 03:56PM   Temperature 98 6 F   Heart Rate 60   Respiration 16   Height 5 ft 2 in   Weight 155 lb    BMI Calculated 28 35   BSA Calculated 1 72   O2 Saturation 98     Physical Exam    Constitutional   General appearance: No acute distress, well appearing and well nourished  Eyes   Conjunctiva and lids: No swelling, erythema or discharge  Pupils and irises: Equal, round and reactive to light  Ears, Nose, Mouth, and Throat   External inspection of ears and nose: Normal     Oropharynx: Normal with no erythema, edema, exudate or lesions  Pulmonary   Auscultation of lungs: Clear to auscultation  Cardiovascular   Auscultation of heart: Normal rate and rhythm, normal S1 and S2, without murmurs  Examination of extremities for edema and/or varicosities: Normal     Carotid pulses: Normal     Abdomen   Abdomen: Non-tender, no masses  Liver and spleen: No hepatomegaly or splenomegaly      Lymphatic Palpation of lymph nodes in neck: No lymphadenopathy  Musculoskeletal   Gait and station: Normal     Inspection/palpation of joints, bones, and muscles: Normal     Skin   Skin and subcutaneous tissue: Normal without rashes or lesions  Neurologic   Cranial nerves: Cranial nerves 2-12 intact  Sensation: No sensory loss  Psychiatric   Orientation to person, place, and time: Normal     Mood and affect: Normal         ECOG 0       Results/Data  (1) CBC/PLT/DIFF [id number] 08:28AM EPIC, Provider   Test ordered by: Jorge Frey     Test Name Result Flag Reference   WBC COUNT 2 97 Thousand/uL L 4 31-10 16   RBC COUNT 4 20 Million/uL  3 81-5 12   HEMOGLOBIN 11 5 g/dL  11 5-15 4   HEMATOCRIT 35 5 %  34 8-46  1   MCV 85 fL  82-98   MCH 27 4 pg  26 8-34 3   MCHC 32 4 g/dL  31 4-37 4   RDW 14 7 %  11 6-15 1   MPV 11 5 fL  8 9-12 7   PLATELET COUNT 647 Thousands/uL  149-390   nRBC AUTOMATED 0 /100 WBCs     NEUTROPHILS RELATIVE PERCENT 55 %  43-75   LYMPHOCYTES RELATIVE PERCENT 30 %  14-44   MONOCYTES RELATIVE PERCENT 12 %  4-12   EOSINOPHILS RELATIVE PERCENT 2 %  0-6   BASOPHILS RELATIVE PERCENT 1 %  0-1   NEUTROPHILS ABSOLUTE COUNT 1 63 Thousands/? ??L L 1 85-7 62   LYMPHOCYTES ABSOLUTE COUNT 0 90 Thousands/? ??L  0 60-4 47   MONOCYTES ABSOLUTE COUNT 0 36 Thousand/? ??L  0 17-1 22   EOSINOPHILS ABSOLUTE COUNT 0 06 Thousand/? ??L  0 00-0 61   BASOPHILS ABSOLUTE COUNT 0 02 Thousands/? ??L  0 00-0 10   This bloodwork is non-fasting  Please drink two glasses of water morning of  bloodwork       (1) COMPREHENSIVE METABOLIC PANEL [id number] 76:92QH EPIC, Provider   Test ordered by: Jorge Frey     Test Name Result Flag Reference   SODIUM 141 mmol/L  136-145   POTASSIUM 3 7 mmol/L  3 5-5 3   CHLORIDE 105 mmol/L  100-108   CARBON DIOXIDE 30 mmol/L  21-32   ANION GAP (CALC) 6 mmol/L  4-13   BLOOD UREA NITROGEN 21 mg/dL  5-25   CREATININE 0 94 mg/dL  0 60-1 30   Standardized to IDMS reference method   CALCIUM 9 0 mg/dL 8  3-10 1   BILI, TOTAL 0 45 mg/dL  0 20-1 00   ALK PHOSPHATAS 52 U/L     ALT (SGPT) 28 U/L  12-78   Specimen collection should occur prior to Sulfasalazine administration due to the potential for falsely depressed results  AST(SGOT) 20 U/L  5-45   Specimen collection should occur prior to Sulfasalazine administration due to the potential for falsely depressed results  ALBUMIN 3 4 g/dL L 3 5-5 0   TOTAL PROTEIN 7 3 g/dL  6 4-8 2   eGFR 71 ml/min/1 73sq m     National Kidney Disease Education Program recommendations are as follows:  GFR calculation is accurate only with a steady state creatinine  Chronic Kidney disease less than 60 ml/min/1 73 sq  meters  Kidney failure less than 15 ml/min/1 73 sq  meters  GLUCOSE FASTING 85 mg/dL  65-99   Specimen collection should occur prior to Sulfasalazine administration due to the potential for falsely depressed results  Specimen collection should occur prior to Sulfapyridine administration due to the potential for falsely elevated results  Assessment    1  Abnormal CBC (790 6) (R79 89)   2  Leukopenia (288 50) (D72 819)    Plan  Abnormal CBC    · Follow-up visit in 4 Months Evaluation and Treatment  Follow-up  Status: Complete   Done: 19UUJ5207 03:30PM   Ordered; For: Abnormal CBC; Ordered By: Amna Mcdonnell) Performed:  Due: 10MPV3007; Last Updated By: Srinivas Arevalo; 9/28/2017 4:10:48 PM  Acute sinusitis    · Benzonatate 100 MG Oral Capsule (Tessalon Perles)   Rx By: Darrion Orourke; Dispense: 10 Days ; #:30 Capsule; Refill: 0; For: Acute sinusitis; MORENO = N; Sent To: [facility]; Last Updated By: Ino Casey); 9/28/2017 4:05:58 PM  Leukopenia    · (1) CBC/PLT/DIFF; Status:Active; Requested DPB:[id number];    Perform:[facility]; LMS:[id number]; Ordered; For:Leukopenia; Ordered By:Edgar Chou);   · (1) FERRITIN; Status:Active; Requested WLR:75WFS0985;    Perform:[facility]; WWQ:69YSP2938; Ordered; For:Leukopenia; Ordered By:Pippa Chou);   · (1) VITAMIN B12; Status:Active; Requested RYW:46XFT2685;    Perform:[facility]; LDC:26KGA6089; Ordered; For:Leukopenia; Ordered By:Pippa Chou); Unlinked    · Topiramate 50 MG Oral Tablet   Rx By: Manny Miller; Dispense: 0 Days ; #: Sufficient Tablet; Refill: 0; MORENO = N; Record; Last Updated By: Eri Albert); 9/28/2017 4:05:58 PM    Discussion/Summary    Leukopenia isolated, 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

## 2018-01-22 ENCOUNTER — TRANSCRIBE ORDERS (OUTPATIENT)
Dept: ADMINISTRATIVE | Facility: HOSPITAL | Age: 48
End: 2018-01-22

## 2018-01-22 ENCOUNTER — APPOINTMENT (OUTPATIENT)
Dept: LAB | Facility: HOSPITAL | Age: 48
End: 2018-01-22
Attending: INTERNAL MEDICINE
Payer: COMMERCIAL

## 2018-01-22 VITALS
DIASTOLIC BLOOD PRESSURE: 80 MMHG | TEMPERATURE: 97.5 F | OXYGEN SATURATION: 100 % | SYSTOLIC BLOOD PRESSURE: 110 MMHG | HEIGHT: 62 IN | BODY MASS INDEX: 27.79 KG/M2 | HEART RATE: 70 BPM | RESPIRATION RATE: 18 BRPM | WEIGHT: 151 LBS

## 2018-01-22 VITALS — BODY MASS INDEX: 28.53 KG/M2 | WEIGHT: 156 LBS | SYSTOLIC BLOOD PRESSURE: 123 MMHG | DIASTOLIC BLOOD PRESSURE: 83 MMHG

## 2018-01-22 DIAGNOSIS — D72.819 DECREASED WHITE BLOOD CELL COUNT: ICD-10-CM

## 2018-01-22 LAB
BASOPHILS # BLD AUTO: 0.01 THOUSANDS/ΜL (ref 0–0.1)
BASOPHILS NFR BLD AUTO: 0 % (ref 0–1)
EOSINOPHIL # BLD AUTO: 0.1 THOUSAND/ΜL (ref 0–0.61)
EOSINOPHIL NFR BLD AUTO: 3 % (ref 0–6)
ERYTHROCYTE [DISTWIDTH] IN BLOOD BY AUTOMATED COUNT: 14.6 % (ref 11.6–15.1)
FERRITIN SERPL-MCNC: 7 NG/ML (ref 8–388)
HCT VFR BLD AUTO: 38 % (ref 34.8–46.1)
HGB BLD-MCNC: 12.3 G/DL (ref 11.5–15.4)
LYMPHOCYTES # BLD AUTO: 1.17 THOUSANDS/ΜL (ref 0.6–4.47)
LYMPHOCYTES NFR BLD AUTO: 30 % (ref 14–44)
MCH RBC QN AUTO: 29 PG (ref 26.8–34.3)
MCHC RBC AUTO-ENTMCNC: 32.4 G/DL (ref 31.4–37.4)
MCV RBC AUTO: 90 FL (ref 82–98)
MONOCYTES # BLD AUTO: 0.61 THOUSAND/ΜL (ref 0.17–1.22)
MONOCYTES NFR BLD AUTO: 15 % (ref 4–12)
NEUTROPHILS # BLD AUTO: 2.08 THOUSANDS/ΜL (ref 1.85–7.62)
NEUTS SEG NFR BLD AUTO: 52 % (ref 43–75)
NRBC BLD AUTO-RTO: 0 /100 WBCS
PLATELET # BLD AUTO: 136 THOUSANDS/UL (ref 149–390)
PMV BLD AUTO: 12.3 FL (ref 8.9–12.7)
RBC # BLD AUTO: 4.24 MILLION/UL (ref 3.81–5.12)
VIT B12 SERPL-MCNC: 727 PG/ML (ref 100–900)
WBC # BLD AUTO: 3.97 THOUSAND/UL (ref 4.31–10.16)

## 2018-01-22 PROCEDURE — 36415 COLL VENOUS BLD VENIPUNCTURE: CPT

## 2018-01-22 PROCEDURE — 85025 COMPLETE CBC W/AUTO DIFF WBC: CPT

## 2018-01-22 PROCEDURE — 82607 VITAMIN B-12: CPT

## 2018-01-22 PROCEDURE — 82728 ASSAY OF FERRITIN: CPT

## 2018-03-14 DIAGNOSIS — R39.15 URINARY URGENCY: Primary | ICD-10-CM

## 2018-03-15 ENCOUNTER — OFFICE VISIT (OUTPATIENT)
Dept: HEMATOLOGY ONCOLOGY | Facility: CLINIC | Age: 48
End: 2018-03-15
Payer: COMMERCIAL

## 2018-03-15 VITALS
HEART RATE: 78 BPM | BODY MASS INDEX: 28.68 KG/M2 | TEMPERATURE: 97.2 F | WEIGHT: 168 LBS | RESPIRATION RATE: 16 BRPM | OXYGEN SATURATION: 97 % | HEIGHT: 64 IN | SYSTOLIC BLOOD PRESSURE: 132 MMHG | DIASTOLIC BLOOD PRESSURE: 72 MMHG

## 2018-03-15 DIAGNOSIS — D69.6 THROMBOCYTOPENIA (HCC): Primary | ICD-10-CM

## 2018-03-15 DIAGNOSIS — D50.0 IRON DEFICIENCY ANEMIA DUE TO CHRONIC BLOOD LOSS: ICD-10-CM

## 2018-03-15 PROCEDURE — 99214 OFFICE O/P EST MOD 30 MIN: CPT | Performed by: INTERNAL MEDICINE

## 2018-03-15 RX ORDER — CITALOPRAM 20 MG/1
TABLET ORAL
COMMUNITY
Start: 2017-08-30 | End: 2020-09-08

## 2018-03-15 RX ORDER — PANTOPRAZOLE SODIUM 40 MG/1
40 TABLET, DELAYED RELEASE ORAL
COMMUNITY
Start: 2018-02-22 | End: 2018-10-08

## 2018-03-15 RX ORDER — CHOLECALCIFEROL (VITAMIN D3) 125 MCG
CAPSULE ORAL
COMMUNITY
Start: 2018-02-20 | End: 2018-10-09 | Stop reason: SDUPTHER

## 2018-03-15 RX ORDER — OMEPRAZOLE 10 MG/1
1 CAPSULE, DELAYED RELEASE ORAL DAILY
COMMUNITY
Start: 2016-10-14 | End: 2018-10-08

## 2018-03-15 RX ORDER — ACYCLOVIR 400 MG/1
400 TABLET ORAL
COMMUNITY
Start: 2012-10-12 | End: 2019-11-17 | Stop reason: ALTCHOICE

## 2018-03-15 NOTE — PROGRESS NOTES
Hematology Outpatient Follow - Up Note  [place] Mate 46 y o  female MRN: @ Encounter: [id number]        Date:  3/15/2018        Assessment/ Plan:   Low ferritin most likely secondary to previous hysterectomy, gastritis, intermittent hemorrhoids, the patient did not take prenatal vitamin, I told her she has to take 1 tablet daily it has a high component of iron  2  Right upper quadrant pain, ultrasound of the abdomen is ordered  3  Omeprazole 20 mg p  O  Daily  4  Thrombocytopenia, continue watchful observation and follow-up in 6 months CBC, iron studies           HPI:  70-year-old  female who was seen here in September 2017 with history of anxiety, palpitation, uterine fibroid, status post hysterectomy there was no evidence of malignancy, she was found to have leukopenia with WBC of 2 97 in August 2017, hemoglobin 11 5, MCV 85, platelets 026622, 91% neutrophils, 30% lymphocytes   In July 2016 WBC 4 1, hemoglobin 12 2, MCV 89  In December 2014 WBC 3 7, hemoglobin 12 2, MCV 91, platelets 916088   The patient was found to have low ferritin most likely secondary to menses prior to hysterectomy, she was told to take multivitamin with iron ( prenatal) 1 tab p  O  Daily, she did not do so she came today for follow-up, she was diagnosed with gastritis, hemorrhoids she had EGD and colonoscopy at [facility], there was no evidence of malignancy       Interval History:        Previous Treatment:         Test Results:    Imaging: No results found      Labs:   Lab Results   Component Value Date    WBC 3 97 (L) 01/22/2018    HGB 12 3 01/22/2018    HCT 38 0 01/22/2018    MCV 90 01/22/2018     (L) 01/22/2018     Lab Results   Component Value Date     08/09/2017    K 3 7 08/09/2017     08/09/2017    CO2 30 08/09/2017    ANIONGAP 6 08/09/2017    BUN 21 08/09/2017    CREATININE 0 94 08/09/2017    GLUCOSE 85 08/02/2016    GLUF 85 08/09/2017    CALCIUM 9 0 08/09/2017    AST 20 08/09/2017    ALT 28 08/09/2017    ALKPHOS 52 08/09/2017    PROT 7 3 08/09/2017    BILITOT 0 45 08/09/2017    EGFR 71 08/09/2017       Lab Results   Component Value Date    FERRITIN 7 (L) 01/22/2018       Lab Results   Component Value Date    GEUYMLUV97 727 01/22/2018         ROS:   Review of Systems   Constitutional: Negative  HENT: Negative  Eyes: Negative  Respiratory: Negative  Cardiovascular: Negative  Gastrointestinal: Positive for abdominal pain (Especially in the right upper quadrant)  Negative for blood in stool, constipation, diarrhea, nausea and rectal pain  Endocrine: Negative  Genitourinary: Negative for difficulty urinating  Musculoskeletal: Negative  Skin: Negative  Neurological: Negative  Psychiatric/Behavioral: Negative  Current Medications: Reviewed  Allergies: Reviewed  PMH/FH/SH:  Reviewed      Physical Exam:    Body surface area is 1 82 meters squared  Wt Readings from Last 3 Encounters:   03/15/18 76 2 kg (168 lb)   09/28/17 70 3 kg (155 lb)   08/30/17 68 5 kg (151 lb)        Temp Readings from Last 3 Encounters:   03/15/18 (!) 97 2 °F (36 2 °C) (Tympanic)   09/28/17 98 6 °F (37 °C)   08/30/17 97 5 °F (36 4 °C) (Tympanic)        BP Readings from Last 3 Encounters:   03/15/18 132/72   08/30/17 110/80   05/11/17 123/83         Pulse Readings from Last 3 Encounters:   03/15/18 78   09/28/17 60   08/30/17 70        Physical Exam   Constitutional: She is oriented to person, place, and time  She appears well-developed and well-nourished  No distress  HENT:   Head: Normocephalic and atraumatic  Mouth/Throat: Oropharynx is clear and moist  No oropharyngeal exudate  Eyes: Conjunctivae and EOM are normal  Pupils are equal, round, and reactive to light  Neck: Normal range of motion  Neck supple  No tracheal deviation present  No thyromegaly present  Cardiovascular: Normal rate and regular rhythm  Exam reveals no gallop and no friction rub      No murmur heard   Pulmonary/Chest: Effort normal and breath sounds normal  No respiratory distress  She has no wheezes  She has no rales  She exhibits no tenderness  Abdominal: Soft  Bowel sounds are normal  She exhibits no distension and no mass  There is tenderness ( in the right upper quadrant, however no Buckley sign)  There is no rebound and no guarding  Musculoskeletal: Normal range of motion  Lymphadenopathy:     She has no cervical adenopathy  Neurological: She is alert and oriented to person, place, and time  Skin: Skin is warm and dry  No rash noted  She is not diaphoretic  No erythema  No pallor  Psychiatric: She has a normal mood and affect  Her behavior is normal  Judgment and thought content normal    Vitals reviewed  Goals and Barriers:  Current Goal: Minimize effects of disease  Barriers: None  Patient's Capacity to Self Care:  Patient is able to self care      Code Status: @[facility]@

## 2018-03-15 NOTE — LETTER
March 15, 2018     [facility]  [address]    Patient: Ellie Mejía   YOB: 1970   Date of Visit: 3/15/2018       Dear Dr Fitzpatrick Cover: Thank you for referring Ellie Mejía to me for evaluation  Below are my notes for this consultation  If you have questions, please do not hesitate to call me  I look forward to following your patient along with you  Sincerely,        Lisa Thakkar MD        CC: No Recipients  Lisa Thakkar MD  3/15/2018 10:10 AM  Sign at close encounter  Hematology Outpatient Follow - Up Note  Ellie Mejía 46 y o  female MRN: @ Encounter: [id number]        Date:  3/15/2018        Assessment/ Plan:   Low ferritin most likely secondary to previous hysterectomy, gastritis, intermittent hemorrhoids, the patient did not take prenatal vitamin, I told her she has to take 1 tablet daily it has a high component of iron  2  Right upper quadrant pain, ultrasound of the abdomen is ordered  3  Omeprazole 20 mg p  O  Daily  4  Thrombocytopenia, continue watchful observation and follow-up in 6 months CBC, iron studies           HPI:  52-year-old  female who was seen here in September 2017 with history of anxiety, palpitation, uterine fibroid, status post hysterectomy there was no evidence of malignancy, she was found to have leukopenia with WBC of 2 97 in August 2017, hemoglobin 11 5, MCV 85, platelets [id number], 93% neutrophils, 30% lymphocytes   In July 2016 WBC 4 1, hemoglobin 12 2, MCV 89  In December 2014 WBC 3 7, hemoglobin 12 2, MCV 91, platelets [id number]   The patient was found to have low ferritin most likely secondary to menses prior to hysterectomy, she was told to take multivitamin with iron ( prenatal) 1 tab p   O  Daily, she did not do so she came today for follow-up, she was diagnosed with gastritis, hemorrhoids she had EGD and colonoscopy at [facility], there was no evidence of malignancy       Interval History: Previous Treatment:         Test Results:    Imaging: No results found  Labs:   Lab Results   Component Value Date    WBC 3 97 (L) 01/22/2018    HGB 12 3 01/22/2018    HCT 38 0 01/22/2018    MCV 90 01/22/2018     (L) 01/22/2018     Lab Results   Component Value Date     08/09/2017    K 3 7 08/09/2017     08/09/2017    CO2 30 08/09/2017    ANIONGAP 6 08/09/2017    BUN 21 08/09/2017    CREATININE 0 94 08/09/2017    GLUCOSE 85 08/02/2016    GLUF 85 08/09/2017    CALCIUM 9 0 08/09/2017    AST 20 08/09/2017    ALT 28 08/09/2017    ALKPHOS 52 08/09/2017    PROT 7 3 08/09/2017    BILITOT 0 45 08/09/2017    EGFR 71 08/09/2017       Lab Results   Component Value Date    FERRITIN 7 (L) 01/22/2018       Lab Results   Component Value Date    OGFIFVEC38 727 01/22/2018         ROS:   Review of Systems   Constitutional: Negative  HENT: Negative  Eyes: Negative  Respiratory: Negative  Cardiovascular: Negative  Gastrointestinal: Positive for abdominal pain (Especially in the right upper quadrant)  Negative for blood in stool, constipation, diarrhea, nausea and rectal pain  Endocrine: Negative  Genitourinary: Negative for difficulty urinating  Musculoskeletal: Negative  Skin: Negative  Neurological: Negative  Psychiatric/Behavioral: Negative  Current Medications: Reviewed  Allergies: Reviewed  PMH/FH/SH:  Reviewed      Physical Exam:    Body surface area is 1 82 meters squared      Wt Readings from Last 3 Encounters:   03/15/18 76 2 kg (168 lb)   09/28/17 70 3 kg (155 lb)   08/30/17 68 5 kg (151 lb)        Temp Readings from Last 3 Encounters:   03/15/18 (!) 97 2 °F (36 2 °C) (Tympanic)   09/28/17 98 6 °F (37 °C)   08/30/17 97 5 °F (36 4 °C) (Tympanic)        BP Readings from Last 3 Encounters:   03/15/18 132/72   08/30/17 110/80   05/11/17 123/83         Pulse Readings from Last 3 Encounters:   03/15/18 78   09/28/17 60   08/30/17 70        Physical Exam Constitutional: She is oriented to person, place, and time  She appears well-developed and well-nourished  No distress  HENT:   Head: Normocephalic and atraumatic  Mouth/Throat: Oropharynx is clear and moist  No oropharyngeal exudate  Eyes: Conjunctivae and EOM are normal  Pupils are equal, round, and reactive to light  Neck: Normal range of motion  Neck supple  No tracheal deviation present  No thyromegaly present  Cardiovascular: Normal rate and regular rhythm  Exam reveals no gallop and no friction rub  No murmur heard  Pulmonary/Chest: Effort normal and breath sounds normal  No respiratory distress  She has no wheezes  She has no rales  She exhibits no tenderness  Abdominal: Soft  Bowel sounds are normal  She exhibits no distension and no mass  There is tenderness ( in the right upper quadrant, however no Buckley sign)  There is no rebound and no guarding  Musculoskeletal: Normal range of motion  Lymphadenopathy:     She has no cervical adenopathy  Neurological: She is alert and oriented to person, place, and time  Skin: Skin is warm and dry  No rash noted  She is not diaphoretic  No erythema  No pallor  Psychiatric: She has a normal mood and affect  Her behavior is normal  Judgment and thought content normal    Vitals reviewed  Goals and Barriers:  Current Goal: Minimize effects of disease  Barriers: None  Patient's Capacity to Self Care:  Patient is able to self care      Code Status: [unfilled]

## 2018-03-28 RX ORDER — OXYBUTYNIN CHLORIDE 5 MG/1
TABLET, EXTENDED RELEASE ORAL
Qty: 60 TABLET | Refills: 0 | Status: SHIPPED | OUTPATIENT
Start: 2018-03-28 | End: 2019-01-08

## 2018-05-15 DIAGNOSIS — B00.9 HSV INFECTION: Primary | ICD-10-CM

## 2018-05-15 RX ORDER — VALACYCLOVIR HYDROCHLORIDE 500 MG/1
TABLET, FILM COATED ORAL
Qty: 6 TABLET | Refills: 1 | Status: SHIPPED | OUTPATIENT
Start: 2018-05-15 | End: 2019-01-08

## 2018-06-11 DIAGNOSIS — R39.15 URINARY URGENCY: ICD-10-CM

## 2018-09-10 ENCOUNTER — HOSPITAL ENCOUNTER (OUTPATIENT)
Dept: ULTRASOUND IMAGING | Facility: HOSPITAL | Age: 48
Discharge: HOME | End: 2018-09-10
Attending: INTERNAL MEDICINE
Payer: COMMERCIAL

## 2018-09-10 ENCOUNTER — APPOINTMENT (OUTPATIENT)
Dept: LAB | Facility: HOSPITAL | Age: 48
End: 2018-09-10
Attending: INTERNAL MEDICINE
Payer: COMMERCIAL

## 2018-09-10 DIAGNOSIS — D69.6 THROMBOCYTOPENIA (HCC): ICD-10-CM

## 2018-09-10 DIAGNOSIS — D50.0 IRON DEFICIENCY ANEMIA DUE TO CHRONIC BLOOD LOSS: ICD-10-CM

## 2018-09-10 LAB
BASOPHILS # BLD AUTO: 0.03 THOUSANDS/ΜL (ref 0–0.1)
BASOPHILS NFR BLD AUTO: 1 % (ref 0–1)
EOSINOPHIL # BLD AUTO: 0.11 THOUSAND/ΜL (ref 0–0.61)
EOSINOPHIL NFR BLD AUTO: 2 % (ref 0–6)
ERYTHROCYTE [DISTWIDTH] IN BLOOD BY AUTOMATED COUNT: 12 % (ref 11.6–15.1)
FERRITIN SERPL-MCNC: 31 NG/ML (ref 8–388)
HCT VFR BLD AUTO: 41.3 % (ref 34.8–46.1)
HGB BLD-MCNC: 13.4 G/DL (ref 11.5–15.4)
IMM GRANULOCYTES # BLD AUTO: 0.01 THOUSAND/UL (ref 0–0.2)
IMM GRANULOCYTES NFR BLD AUTO: 0 % (ref 0–2)
LYMPHOCYTES # BLD AUTO: 1.61 THOUSANDS/ΜL (ref 0.6–4.47)
LYMPHOCYTES NFR BLD AUTO: 34 % (ref 14–44)
MCH RBC QN AUTO: 30.6 PG (ref 26.8–34.3)
MCHC RBC AUTO-ENTMCNC: 32.4 G/DL (ref 31.4–37.4)
MCV RBC AUTO: 94 FL (ref 82–98)
MONOCYTES # BLD AUTO: 0.52 THOUSAND/ΜL (ref 0.17–1.22)
MONOCYTES NFR BLD AUTO: 11 % (ref 4–12)
NEUTROPHILS # BLD AUTO: 2.49 THOUSANDS/ΜL (ref 1.85–7.62)
NEUTS SEG NFR BLD AUTO: 52 % (ref 43–75)
NRBC BLD AUTO-RTO: 0 /100 WBCS
PLATELET # BLD AUTO: 170 THOUSANDS/UL (ref 149–390)
PMV BLD AUTO: 12.1 FL (ref 8.9–12.7)
RBC # BLD AUTO: 4.38 MILLION/UL (ref 3.81–5.12)
WBC # BLD AUTO: 4.77 THOUSAND/UL (ref 4.31–10.16)

## 2018-09-10 PROCEDURE — 36415 COLL VENOUS BLD VENIPUNCTURE: CPT

## 2018-09-10 PROCEDURE — 82728 ASSAY OF FERRITIN: CPT

## 2018-09-10 PROCEDURE — 76700 US EXAM ABDOM COMPLETE: CPT

## 2018-09-10 PROCEDURE — 85025 COMPLETE CBC W/AUTO DIFF WBC: CPT

## 2018-09-20 ENCOUNTER — OFFICE VISIT (OUTPATIENT)
Dept: HEMATOLOGY ONCOLOGY | Facility: CLINIC | Age: 48
End: 2018-09-20
Payer: COMMERCIAL

## 2018-09-20 VITALS
BODY MASS INDEX: 29.71 KG/M2 | WEIGHT: 174 LBS | TEMPERATURE: 98.5 F | OXYGEN SATURATION: 98 % | HEIGHT: 64 IN | SYSTOLIC BLOOD PRESSURE: 121 MMHG | RESPIRATION RATE: 18 BRPM | HEART RATE: 68 BPM | DIASTOLIC BLOOD PRESSURE: 81 MMHG

## 2018-09-20 DIAGNOSIS — R79.0 LOW FERRITIN LEVEL: Primary | ICD-10-CM

## 2018-09-20 DIAGNOSIS — D72.819 LEUKOPENIA, UNSPECIFIED TYPE: ICD-10-CM

## 2018-09-20 PROCEDURE — 99214 OFFICE O/P EST MOD 30 MIN: CPT | Performed by: INTERNAL MEDICINE

## 2018-09-20 NOTE — LETTER
September 20, 2018     Alesha Quiroz MD  [address] [facility]  [address]    Patient: Jani Webb   YOB: 1970   Date of Visit: 9/20/2018       Dear Dr Fredrick Leon:    Thank you for referring Jani Webb to me for evaluation  Below are my notes for this consultation  If you have questions, please do not hesitate to call me  I look forward to following your patient along with you  Sincerely,        Sonia Hernandez MD        CC: No Recipients  Sonia Hernandez MD  9/20/2018  9:50 AM  Sign at close encounter  Hematology Outpatient Follow - Up Note  Jani Webb 46 y o  female MRN: @ Encounter: [id number]        Date:  9/20/2018        Assessment/ Plan:    low ferritin most likely secondary to previous hysterectomy, gastritis, intermittent hemorrhoids, she had EGD and colonoscopy showed gastritis, no evidence of malignancy, the patient is on prenatal 1 tab p  O  Daily, she has significant improvement in hemoglobin as well as ferritin  She will continue on omeprazole 20 mg p  O  Daily  Thrombocytopenia and leukopenia improved on multivitamin 1 tab p  O  Daily   I suggest CBC and differential in 6-12 months   Will be glad to see the patient again if needed           HPI:  53-year-old  female who was seen here in September 2017 with history of anxiety, palpitation, uterine fibroid, status post hysterectomy there was no evidence of malignancy, she was found to have leukopenia with WBC of 2 97 in August 2017, hemoglobin 11 5, MCV 85, platelets [id number], 65% neutrophils, 30% lymphocytes   In July 2016 WBC 4 1, hemoglobin 12 2, MCV 89  In December 2014 WBC 3 7, hemoglobin 12 2, MCV 91, platelets [id number]   The patient was found to have low ferritin most likely secondary to menses prior to hysterectomy, she was told to take multivitamin with iron ( prenatal) 1 tab p   O  Daily, she did not do so she came today for follow-up, she was diagnosed with gastritis, hemorrhoids she had EGD and colonoscopy at [facility], there was no evidence of malignancy, there was evidence of a gastritis, she was initiated on pantoprazole     Interval History:        Previous Treatment:         Test Results:    Imaging: Us Abdomen Complete    Result Date: 9/11/2018  Narrative: ABDOMEN ULTRASOUND, COMPLETE INDICATION:   D69 6: Thrombocytopenia, unspecified 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)  COMPARISON:  None TECHNIQUE:   Real-time ultrasound of the abdomen was performed with a curvilinear transducer with both volumetric sweeps and still imaging techniques  FINDINGS: PANCREAS:  Visualized portions of the pancreas are within normal limits  AORTA AND IVC:  Visualized portions are normal for patient age  LIVER: Size:  Within normal range  The liver measures 14 2 cm in the midclavicular line  Contour:  Surface contour is smooth  Parenchyma:  Echogenicity and echotexture are within normal limits  No evidence of suspicious mass  Limited imaging of the main portal vein shows it to be patent and hepatopetal  BILIARY: The gallbladder is normal in caliber  No wall thickening or pericholecystic fluid  No stones or sludge identified  No sonographic Buckley's sign  No intrahepatic biliary dilatation  CBD measures 5 mm  No choledocholithiasis  KIDNEY: Right kidney measures 10 x 4 3 cm  Within normal limits  Left kidney measures 10 3 x 5 4 cm  Within normal limits  SPLEEN: Measures 9 5 x 9 3 x 4 9 cm  Within normal limits  ASCITES:  None       Impression: Normal  Workstation performed: [id number]       Labs:   Lab Results   Component Value Date    WBC 4 77 09/10/2018    HGB 13 4 09/10/2018    HCT 41 3 09/10/2018    MCV 94 09/10/2018     09/10/2018     Lab Results   Component Value Date     08/09/2017    K 3 7 08/09/2017     08/09/2017    CO2 30 08/09/2017    ANIONGAP 7 12/19/2014    BUN 21 08/09/2017    CREATININE 0 94 08/09/2017 GLUCOSE 88 12/19/2014    GLUF 85 08/09/2017    CALCIUM 9 0 08/09/2017    AST 20 08/09/2017    ALT 28 08/09/2017    ALKPHOS 52 08/09/2017    PROT 7 0 12/19/2014    BILITOT 0 61 12/19/2014    EGFR 71 08/09/2017       Lab Results   Component Value Date    FERRITIN 31 09/10/2018       Lab Results   Component Value Date    BRHIZCOU12 336 01/22/2018         ROS:   Review of Systems   Constitutional: Positive for fatigue and unexpected weight change ( she said that she is gaining weight)  Negative for activity change, appetite change, diaphoresis and fever  HENT: Negative for facial swelling, hearing loss, rhinorrhea, sinus pain, sinus pressure, sneezing, sore throat and tinnitus  Eyes: Negative for photophobia, pain, discharge, redness, itching and visual disturbance  Respiratory: Negative for apnea and chest tightness  Cardiovascular: Negative for chest pain, palpitations and leg swelling  Gastrointestinal: Negative for abdominal distention, abdominal pain, blood in stool, constipation, diarrhea, nausea, rectal pain and vomiting  Endocrine: Negative for cold intolerance, heat intolerance, polydipsia and polyphagia  Genitourinary: Negative for difficulty urinating, dyspareunia, frequency, hematuria, pelvic pain and urgency  Musculoskeletal: Negative for arthralgias, back pain, gait problem, joint swelling and myalgias  Skin: Negative for color change, pallor and rash  Allergic/Immunologic: Negative for environmental allergies and food allergies  Neurological: Negative for dizziness, tremors, seizures, syncope, speech difficulty, numbness and headaches  Hematological: Negative for adenopathy  Does not bruise/bleed easily  Psychiatric/Behavioral: Negative for agitation, confusion, dysphoric mood, hallucinations and suicidal ideas  Current Medications: Reviewed  Allergies: Reviewed  PMH/FH/SH:  Reviewed      Physical Exam:    Body surface area is 1 84 meters squared      Wt Readings from Last 3 Encounters:   09/20/18 78 9 kg (174 lb)   03/15/18 76 2 kg (168 lb)   09/28/17 70 3 kg (155 lb)        Temp Readings from Last 3 Encounters:   09/20/18 98 5 °F (36 9 °C) (Oral)   03/15/18 (!) 97 2 °F (36 2 °C) (Tympanic)   09/28/17 98 6 °F (37 °C)        BP Readings from Last 3 Encounters:   09/20/18 121/81   03/15/18 132/72   08/30/17 110/80         Pulse Readings from Last 3 Encounters:   09/20/18 68   03/15/18 78   09/28/17 60        Physical Exam   Constitutional: She is oriented to person, place, and time  She appears well-developed and well-nourished  No distress  HENT:   Head: Normocephalic and atraumatic  Mouth/Throat: Oropharynx is clear and moist  No oropharyngeal exudate  Eyes: Conjunctivae and EOM are normal  Pupils are equal, round, and reactive to light  Neck: Normal range of motion  Neck supple  No JVD present  No tracheal deviation present  No thyromegaly present  Cardiovascular: Normal rate and regular rhythm  Exam reveals no gallop and no friction rub  No murmur heard  Pulmonary/Chest: Effort normal and breath sounds normal  No respiratory distress  She has no wheezes  She has no rales  She exhibits no tenderness  Abdominal: Soft  Bowel sounds are normal  She exhibits no distension and no mass  There is no tenderness  There is no rebound and no guarding  Musculoskeletal: Normal range of motion  She exhibits no edema or tenderness  Lymphadenopathy:     She has no cervical adenopathy  Neurological: She is alert and oriented to person, place, and time  Skin: Skin is warm and dry  No rash noted  She is not diaphoretic  No erythema  No pallor  Psychiatric: She has a normal mood and affect  Her behavior is normal  Judgment and thought content normal    Vitals reviewed  Goals and Barriers:  Current Goal: Minimize effects of disease  Barriers: None  Patient's Capacity to Self Care:  Patient is able to self care      Code Status: [unfilled]

## 2018-09-20 NOTE — PROGRESS NOTES
Hematology Outpatient Follow - Up Note  Radhika Ramachandran 46 y o  female MRN: @ Encounter: [id number]        Date:  9/20/2018        Assessment/ Plan:    low ferritin most likely secondary to previous hysterectomy, gastritis, intermittent hemorrhoids, she had EGD and colonoscopy showed gastritis, no evidence of malignancy, the patient is on prenatal 1 tab p  O  Daily, she has significant improvement in hemoglobin as well as ferritin  She will continue on omeprazole 20 mg p  O  Daily  Thrombocytopenia and leukopenia improved on multivitamin 1 tab p  O  Daily   I suggest CBC and differential in 6-12 months   Will be glad to see the patient again if needed           HPI:  78-year-old  female who was seen here in September 2017 with history of anxiety, palpitation, uterine fibroid, status post hysterectomy there was no evidence of malignancy, she was found to have leukopenia with WBC of 2 97 in August 2017, hemoglobin 11 5, MCV 85, platelets [id number], 84% neutrophils, 30% lymphocytes   In July 2016 WBC 4 1, hemoglobin 12 2, MCV 89  In December 2014 WBC 3 7, hemoglobin 12 2, MCV 91, platelets [id number]   The patient was found to have low ferritin most likely secondary to menses prior to hysterectomy, she was told to take multivitamin with iron ( prenatal) 1 tab p  O  Daily, she did not do so she came today for follow-up, she was diagnosed with gastritis, hemorrhoids she had EGD and colonoscopy at [facility], there was no evidence of malignancy, there was evidence of a gastritis, she was initiated on pantoprazole     Interval History:        Previous Treatment:         Test Results:    Imaging: Us Abdomen Complete    Result Date: 9/11/2018  Narrative: ABDOMEN ULTRASOUND, COMPLETE INDICATION:   D69 6: Thrombocytopenia, unspecified 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)   COMPARISON:  None TECHNIQUE:   Real-time ultrasound of the abdomen was performed with a curvilinear transducer with both volumetric sweeps and still imaging techniques  FINDINGS: PANCREAS:  Visualized portions of the pancreas are within normal limits  AORTA AND IVC:  Visualized portions are normal for patient age  LIVER: Size:  Within normal range  The liver measures 14 2 cm in the midclavicular line  Contour:  Surface contour is smooth  Parenchyma:  Echogenicity and echotexture are within normal limits  No evidence of suspicious mass  Limited imaging of the main portal vein shows it to be patent and hepatopetal  BILIARY: The gallbladder is normal in caliber  No wall thickening or pericholecystic fluid  No stones or sludge identified  No sonographic Buckley's sign  No intrahepatic biliary dilatation  CBD measures 5 mm  No choledocholithiasis  KIDNEY: Right kidney measures 10 x 4 3 cm  Within normal limits  Left kidney measures 10 3 x 5 4 cm  Within normal limits  SPLEEN: Measures 9 5 x 9 3 x 4 9 cm  Within normal limits  ASCITES:  None  Impression: Normal  Workstation performed: [id number]       Labs:   Lab Results   Component Value Date    WBC 4 77 09/10/2018    HGB 13 4 09/10/2018    HCT 41 3 09/10/2018    MCV 94 09/10/2018     09/10/2018     Lab Results   Component Value Date     08/09/2017    K 3 7 08/09/2017     08/09/2017    CO2 30 08/09/2017    ANIONGAP 7 12/19/2014    BUN 21 08/09/2017    CREATININE 0 94 08/09/2017    GLUCOSE 88 12/19/2014    GLUF 85 08/09/2017    CALCIUM 9 0 08/09/2017    AST 20 08/09/2017    ALT 28 08/09/2017    ALKPHOS 52 08/09/2017    PROT 7 0 12/19/2014    BILITOT 0 61 12/19/2014    EGFR 71 08/09/2017       Lab Results   Component Value Date    FERRITIN 31 09/10/2018       Lab Results   Component Value Date    [id number] 679 01/22/2018         ROS:   Review of Systems   Constitutional: Positive for fatigue and unexpected weight change ( she said that she is gaining weight)  Negative for activity change, appetite change, diaphoresis and fever     HENT: Negative for facial swelling, hearing loss, rhinorrhea, sinus pain, sinus pressure, sneezing, sore throat and tinnitus  Eyes: Negative for photophobia, pain, discharge, redness, itching and visual disturbance  Respiratory: Negative for apnea and chest tightness  Cardiovascular: Negative for chest pain, palpitations and leg swelling  Gastrointestinal: Negative for abdominal distention, abdominal pain, blood in stool, constipation, diarrhea, nausea, rectal pain and vomiting  Endocrine: Negative for cold intolerance, heat intolerance, polydipsia and polyphagia  Genitourinary: Negative for difficulty urinating, dyspareunia, frequency, hematuria, pelvic pain and urgency  Musculoskeletal: Negative for arthralgias, back pain, gait problem, joint swelling and myalgias  Skin: Negative for color change, pallor and rash  Allergic/Immunologic: Negative for environmental allergies and food allergies  Neurological: Negative for dizziness, tremors, seizures, syncope, speech difficulty, numbness and headaches  Hematological: Negative for adenopathy  Does not bruise/bleed easily  Psychiatric/Behavioral: Negative for agitation, confusion, dysphoric mood, hallucinations and suicidal ideas  Current Medications: Reviewed  Allergies: Reviewed  PMH/FH/SH:  Reviewed      Physical Exam:    Body surface area is 1 84 meters squared  Wt Readings from Last 3 Encounters:   09/20/18 78 9 kg (174 lb)   03/15/18 76 2 kg (168 lb)   09/28/17 70 3 kg (155 lb)        Temp Readings from Last 3 Encounters:   09/20/18 98 5 °F (36 9 °C) (Oral)   03/15/18 (!) 97 2 °F (36 2 °C) (Tympanic)   09/28/17 98 6 °F (37 °C)        BP Readings from Last 3 Encounters:   09/20/18 121/81   03/15/18 132/72   08/30/17 110/80         Pulse Readings from Last 3 Encounters:   09/20/18 68   03/15/18 78   09/28/17 60        Physical Exam   Constitutional: She is oriented to person, place, and time  She appears well-developed and well-nourished   No distress  HENT:   Head: Normocephalic and atraumatic  Mouth/Throat: Oropharynx is clear and moist  No oropharyngeal exudate  Eyes: Conjunctivae and EOM are normal  Pupils are equal, round, and reactive to light  Neck: Normal range of motion  Neck supple  No JVD present  No tracheal deviation present  No thyromegaly present  Cardiovascular: Normal rate and regular rhythm  Exam reveals no gallop and no friction rub  No murmur heard  Pulmonary/Chest: Effort normal and breath sounds normal  No respiratory distress  She has no wheezes  She has no rales  She exhibits no tenderness  Abdominal: Soft  Bowel sounds are normal  She exhibits no distension and no mass  There is no tenderness  There is no rebound and no guarding  Musculoskeletal: Normal range of motion  She exhibits no edema or tenderness  Lymphadenopathy:     She has no cervical adenopathy  Neurological: She is alert and oriented to person, place, and time  Skin: Skin is warm and dry  No rash noted  She is not diaphoretic  No erythema  No pallor  Psychiatric: She has a normal mood and affect  Her behavior is normal  Judgment and thought content normal    Vitals reviewed  Goals and Barriers:  Current Goal: Minimize effects of disease  Barriers: None  Patient's Capacity to Self Care:  Patient is able to self care      Code Status: [unfilled]

## 2018-10-04 PROBLEM — D72.819 LEUKOPENIA: Status: ACTIVE | Noted: 2017-08-30

## 2018-10-04 PROBLEM — M54.2 CHRONIC NECK PAIN: Status: ACTIVE | Noted: 2017-08-30

## 2018-10-04 PROBLEM — G89.29 CHRONIC NECK PAIN: Status: ACTIVE | Noted: 2017-08-30

## 2018-10-04 PROBLEM — M54.9 CHRONIC UPPER BACK PAIN: Status: ACTIVE | Noted: 2017-08-30

## 2018-10-04 PROBLEM — N32.81 OVERACTIVE BLADDER: Status: ACTIVE | Noted: 2018-10-04

## 2018-10-04 PROBLEM — F41.9 ANXIETY: Status: ACTIVE | Noted: 2017-08-30

## 2018-10-04 PROBLEM — D69.6 THROMBOCYTOPENIA (HCC): Status: ACTIVE | Noted: 2018-10-04

## 2018-10-04 PROBLEM — G89.29 CHRONIC UPPER BACK PAIN: Status: ACTIVE | Noted: 2017-08-30

## 2018-10-08 ENCOUNTER — OFFICE VISIT (OUTPATIENT)
Dept: FAMILY MEDICINE CLINIC | Facility: CLINIC | Age: 48
End: 2018-10-08
Payer: COMMERCIAL

## 2018-10-08 ENCOUNTER — TRANSCRIBE ORDERS (OUTPATIENT)
Dept: LAB | Facility: CLINIC | Age: 48
End: 2018-10-08

## 2018-10-08 ENCOUNTER — APPOINTMENT (OUTPATIENT)
Dept: LAB | Facility: CLINIC | Age: 48
End: 2018-10-08
Payer: COMMERCIAL

## 2018-10-08 VITALS
DIASTOLIC BLOOD PRESSURE: 80 MMHG | HEIGHT: 64 IN | SYSTOLIC BLOOD PRESSURE: 130 MMHG | HEART RATE: 55 BPM | WEIGHT: 172 LBS | OXYGEN SATURATION: 99 % | RESPIRATION RATE: 16 BRPM | BODY MASS INDEX: 29.37 KG/M2 | TEMPERATURE: 96.8 F

## 2018-10-08 DIAGNOSIS — E78.5 HYPERLIPIDEMIA, UNSPECIFIED HYPERLIPIDEMIA TYPE: ICD-10-CM

## 2018-10-08 DIAGNOSIS — R53.83 FATIGUE, UNSPECIFIED TYPE: ICD-10-CM

## 2018-10-08 DIAGNOSIS — Z23 NEED FOR INFLUENZA VACCINATION: ICD-10-CM

## 2018-10-08 DIAGNOSIS — R10.13 EPIGASTRIC PAIN: ICD-10-CM

## 2018-10-08 DIAGNOSIS — Z12.39 BREAST CANCER SCREENING: Primary | ICD-10-CM

## 2018-10-08 PROBLEM — D72.819 LEUKOPENIA: Status: RESOLVED | Noted: 2017-08-30 | Resolved: 2018-10-08

## 2018-10-08 PROBLEM — Z98.890 HISTORY OF COLONOSCOPY: Status: ACTIVE | Noted: 2018-10-08

## 2018-10-08 PROBLEM — D69.6 THROMBOCYTOPENIA (HCC): Status: RESOLVED | Noted: 2018-10-04 | Resolved: 2018-10-08

## 2018-10-08 LAB
ALBUMIN SERPL BCP-MCNC: 3.5 G/DL (ref 3.5–5)
ALP SERPL-CCNC: 48 U/L (ref 46–116)
ALT SERPL W P-5'-P-CCNC: 50 U/L (ref 12–78)
ANION GAP SERPL CALCULATED.3IONS-SCNC: 3 MMOL/L (ref 4–13)
AST SERPL W P-5'-P-CCNC: 25 U/L (ref 5–45)
BILIRUB SERPL-MCNC: 0.72 MG/DL (ref 0.2–1)
BUN SERPL-MCNC: 19 MG/DL (ref 5–25)
CALCIUM SERPL-MCNC: 8.9 MG/DL (ref 8.3–10.1)
CHLORIDE SERPL-SCNC: 104 MMOL/L (ref 100–108)
CHOLEST SERPL-MCNC: 197 MG/DL (ref 50–200)
CO2 SERPL-SCNC: 29 MMOL/L (ref 21–32)
CREAT SERPL-MCNC: 0.91 MG/DL (ref 0.6–1.3)
GFR SERPL CREATININE-BSD FRML MDRD: 74 ML/MIN/1.73SQ M
GLUCOSE P FAST SERPL-MCNC: 85 MG/DL (ref 65–99)
HDLC SERPL-MCNC: 56 MG/DL (ref 40–60)
LDLC SERPL CALC-MCNC: 125 MG/DL (ref 0–100)
NONHDLC SERPL-MCNC: 141 MG/DL
POTASSIUM SERPL-SCNC: 3.9 MMOL/L (ref 3.5–5.3)
PROT SERPL-MCNC: 7.4 G/DL (ref 6.4–8.2)
SODIUM SERPL-SCNC: 136 MMOL/L (ref 136–145)
TRIGL SERPL-MCNC: 82 MG/DL
TSH SERPL DL<=0.05 MIU/L-ACNC: 2.06 UIU/ML (ref 0.36–3.74)

## 2018-10-08 PROCEDURE — 36415 COLL VENOUS BLD VENIPUNCTURE: CPT | Performed by: PHYSICIAN ASSISTANT

## 2018-10-08 PROCEDURE — 80053 COMPREHEN METABOLIC PANEL: CPT | Performed by: PHYSICIAN ASSISTANT

## 2018-10-08 PROCEDURE — 3725F SCREEN DEPRESSION PERFORMED: CPT | Performed by: PHYSICIAN ASSISTANT

## 2018-10-08 PROCEDURE — 99214 OFFICE O/P EST MOD 30 MIN: CPT | Performed by: PHYSICIAN ASSISTANT

## 2018-10-08 PROCEDURE — 80061 LIPID PANEL: CPT | Performed by: PHYSICIAN ASSISTANT

## 2018-10-08 PROCEDURE — 84443 ASSAY THYROID STIM HORMONE: CPT | Performed by: PHYSICIAN ASSISTANT

## 2018-10-08 RX ORDER — FAMOTIDINE 20 MG/1
20 TABLET, FILM COATED ORAL DAILY
Qty: 90 TABLET | Refills: 0 | Status: SHIPPED | OUTPATIENT
Start: 2018-10-08 | End: 2020-07-08 | Stop reason: ALTCHOICE

## 2018-10-08 NOTE — PATIENT INSTRUCTIONS
Start Pepcid 20 mg daily  Refer to GI specialist  Will retrieve records from Dr Karena Ogden for recent endoscopy/colonoscopy  Will retrieve records from Dr Julian Lopez from recent Pap smear over the last several months  Mammogram ordered  She will proceed with fasting blood work today  Patient refuses the flu vaccine today

## 2018-10-08 NOTE — PROGRESS NOTES
Assessment/Plan:    Patient Instructions   Start Pepcid 20 mg daily  Refer to GI specialist  Will retrieve records from Dr Júnior Pritchett for recent endoscopy/colonoscopy  Will retrieve records from Dr Angel Nelson from recent Pap smear over the last several months  Mammogram ordered  She will proceed with fasting blood work today    M*Modal software was used to dictate this note  It may contain errors with dictating incorrect words/spelling  Please contact provider directly for any questions  Diagnoses and all orders for this visit:    Breast cancer screening  -     Mammo screening bilateral w 3d & cad; Future    Cervical cancer screening  -     Ambulatory referral to Obstetrics / Gynecology; Future    Screening for colon cancer  -     Ambulatory referral to Gastroenterology; Future    Hyperlipidemia, unspecified hyperlipidemia type  -     Comprehensive metabolic panel  -     Lipid panel    Fatigue, unspecified type  -     Comprehensive metabolic panel  -     TSH, 3rd generation with Free T4 reflex    Epigastric pain  -     Ambulatory referral to Gastroenterology; Future    Need for influenza vaccination  -     influenza vaccine, [id number], quadrivalent, recombinant, PF, 0 5 mL, for patients 18 yr+ (FLUBLOK)          Subjective:      Patient ID: Uzma Roland is a 46 y o  female  Patient presents today for follow-up for hyperlipidemia  She is fasting for blood work today    She also complains of fatigue  She did have a recent blood count which was normal     She continues with epigastric pain  She states she had an endoscopy and colonoscopy within the last year by Dr Júnior Pritchett at [facility]   She never saw GI specialist   She was on pantoprazole with no improvement of her symptoms  She does get intermittent reflux symptoms  She is concerned she may have a Cee  She last travel out of the country about 3 years ago to the [facility]    She states her  had a bacterial infection 2 years ago  She does intermittent nausea but denies any vomiting  She states at times she does have a soft bowel movement  The following portions of the patient's history were reviewed and updated as appropriate:   She  has a past medical history of Hyperlipidemia and Hypertension  She   Patient Active Problem List    Diagnosis Date Noted    Fatigue 10/08/2018    Overactive bladder 10/04/2018    Anxiety 2017    Chronic neck pain 2017    Chronic upper back pain 2017    Early satiety 2016    First degree hemorrhoids 2016    Constipation 10/14/2016    Epigastric pain 10/14/2016    Nocturnal leg cramps 2016    Heart palpitations 2016    Mild vitamin D deficiency 2016    Ovarian cyst, complex 2015    Headache 2015    Hyperlipidemia 2015    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    Osteoarthritis 2014    Herpes simplex infection 2012     She  has a past surgical history that includes  section, low transverse; Laparoscopic total hysterectomy; Larynx surgery; and Tubal ligation  Her family history includes Hypertension in her mother  She  reports that she has never smoked  She has never used smokeless tobacco  She reports that she does not drink alcohol or use drugs  Current Outpatient Prescriptions   Medication Sig Dispense Refill    acyclovir (ZOVIRAX) 400 MG tablet Take 400 mg by mouth      citalopram (CeleXA) 20 mg tablet Take by mouth      omeprazole (PriLOSEC) 10 mg delayed release capsule Take 1 capsule by mouth daily      oxybutynin (DITROPAN-XL) 5 mg 24 hr tablet take 1 tablet by mouth at bedtime 60 tablet 0    pantoprazole (PROTONIX) 40 mg tablet Take 40 mg by mouth      RA VITAMIN D-3 5000 units capsule       valACYclovir (VALTREX) 500 mg tablet TAKE 1 TABLET TWICE DAILY  6 tablet 1     No current facility-administered medications for this visit        Current Outpatient Prescriptions on File Prior to Visit   Medication Sig    acyclovir (ZOVIRAX) 400 MG tablet Take 400 mg by mouth    citalopram (CeleXA) 20 mg tablet Take by mouth    omeprazole (PriLOSEC) 10 mg delayed release capsule Take 1 capsule by mouth daily    oxybutynin (DITROPAN-XL) 5 mg 24 hr tablet take 1 tablet by mouth at bedtime    pantoprazole (PROTONIX) 40 mg tablet Take 40 mg by mouth    RA VITAMIN D-3 5000 units capsule     valACYclovir (VALTREX) 500 mg tablet TAKE 1 TABLET TWICE DAILY  No current facility-administered medications on file prior to visit  She has No Known Allergies       Review of Systems   Constitutional: Positive for fatigue  Negative for chills and fever  Gastrointestinal:        As stated in HPI         Objective:      /80 (BP Location: Left arm, Patient Position: Sitting, Cuff Size: Standard)   Pulse 55   Temp (!) 96 8 °F (36 °C) (Tympanic)   Resp 16   Ht 5' 4" (1 626 m)   Wt 78 kg (172 lb)   SpO2 99%   Breastfeeding? No   BMI 29 52 kg/m²          Physical Exam   Constitutional: She appears well-developed and well-nourished  No distress  HENT:   Head: Normocephalic and atraumatic  Right Ear: External ear normal    Left Ear: External ear normal    Mouth/Throat: Oropharynx is clear and moist    Neck: Neck supple  Cardiovascular: Normal rate, regular rhythm and normal heart sounds  No murmur heard  Pulmonary/Chest: Effort normal and breath sounds normal  No respiratory distress  She has no wheezes  She has no rales  Abdominal: Soft  Bowel sounds are normal  There is tenderness (She does have mild tenderness in the epigastric region)  Lymphadenopathy:     She has no cervical adenopathy  Neurological: She is alert  Skin: Skin is warm  Psychiatric: She has a normal mood and affect

## 2018-10-09 DIAGNOSIS — E55.9 MILD VITAMIN D DEFICIENCY: Primary | ICD-10-CM

## 2018-10-09 RX ORDER — CHOLECALCIFEROL (VITAMIN D3) 125 MCG
5000 CAPSULE ORAL DAILY
Qty: 90 CAPSULE | Refills: 1 | Status: SHIPPED | OUTPATIENT
Start: 2018-10-09 | End: 2019-11-17 | Stop reason: SDUPTHER

## 2018-10-13 RX ORDER — OXYBUTYNIN CHLORIDE 5 MG/1
TABLET, EXTENDED RELEASE ORAL
Qty: 60 TABLET | Refills: 0 | OUTPATIENT
Start: 2018-10-13

## 2019-01-08 ENCOUNTER — OFFICE VISIT (OUTPATIENT)
Dept: GASTROENTEROLOGY | Facility: MEDICAL CENTER | Age: 49
End: 2019-01-08
Payer: COMMERCIAL

## 2019-01-08 VITALS
TEMPERATURE: 97.2 F | HEART RATE: 64 BPM | WEIGHT: 174 LBS | HEIGHT: 64 IN | BODY MASS INDEX: 29.71 KG/M2 | SYSTOLIC BLOOD PRESSURE: 118 MMHG | DIASTOLIC BLOOD PRESSURE: 76 MMHG

## 2019-01-08 DIAGNOSIS — K59.01 SLOW TRANSIT CONSTIPATION: ICD-10-CM

## 2019-01-08 DIAGNOSIS — R79.0 LOW FERRITIN: ICD-10-CM

## 2019-01-08 DIAGNOSIS — R68.81 EARLY SATIETY: Primary | ICD-10-CM

## 2019-01-08 DIAGNOSIS — K64.0 FIRST DEGREE HEMORRHOIDS: ICD-10-CM

## 2019-01-08 DIAGNOSIS — R10.13 EPIGASTRIC PAIN: ICD-10-CM

## 2019-01-08 PROCEDURE — 99244 OFF/OP CNSLTJ NEW/EST MOD 40: CPT | Performed by: INTERNAL MEDICINE

## 2019-01-08 RX ORDER — SODIUM, POTASSIUM,MAG SULFATES 17.5-3.13G
1 SOLUTION, RECONSTITUTED, ORAL ORAL ONCE
Qty: 2 BOTTLE | Refills: 0 | Status: SHIPPED | OUTPATIENT
Start: 2019-01-08 | End: 2019-04-09

## 2019-01-08 RX ORDER — ZINC OXIDE 13 %
1 CREAM (GRAM) TOPICAL DAILY
Qty: 30 CAPSULE | Refills: 0 | Status: SHIPPED | OUTPATIENT
Start: 2019-01-08 | End: 2019-02-07

## 2019-01-08 RX ORDER — HYDROCORTISONE ACETATE 25 MG/1
25 SUPPOSITORY RECTAL 2 TIMES DAILY
Qty: 12 SUPPOSITORY | Refills: 0 | Status: SHIPPED | OUTPATIENT
Start: 2019-01-08 | End: 2020-07-08 | Stop reason: ALTCHOICE

## 2019-01-08 NOTE — PATIENT INSTRUCTIONS
The patient is scheduled on 3/26/19 at [address] [address] for a colon/egd with Dr Krysta Mcnally instructions were gone over with by the MA

## 2019-01-08 NOTE — LETTER
January 10, 2019     David Springer PA-C  [address] [facility] [place]  [address]    Patient: Uzma Roland   YOB: 1970   Date of Visit: 1/8/2019       Dear Dr Marian Del Toro: Thank you for referring Uzma Roland to me for evaluation  Below are my notes for this consultation  If you have questions, please do not hesitate to call me  I look forward to following your patient along with you  Sincerely,        Nena Alvarez MD        CC: No Recipients  Nena Alvarez MD  1/8/2019  9:45 AM  Incomplete  Assessment/Plan:    No problem-specific Assessment & Plan notes found for this encounter  There are no diagnoses linked to this encounter  Subjective:      Patient ID: Uzma Roland is a 46 y o  female  HPI     She gained 25 lbs  Has increased bloated and gassiness and flatulence  She has constipation  No bleeding  She had a colonoscopy done 2 years ago  She had an EGD as well  She also feels she has hemorrhoids  She feels something is moving inside  She takes Pepcid  She has heartburn and had hoarseness  US in 9/18 : normal    Hysterectomy one year ago     tsh is normal    Hb is stable and MCV is normal      The following portions of the patient's history were reviewed and updated as appropriate: allergies, current medications, past family history, past medical history, past social history, past surgical history and problem list     Review of Systems      Objective:      /76 (BP Location: Left arm, Patient Position: Sitting, Cuff Size: Adult)   Pulse 64   Temp (!) 97 2 °F (36 2 °C) (Tympanic)   Ht 5' 4" (1 626 m)   Wt 78 9 kg (174 lb)   LMP 07/02/2016   BMI 29 87 kg/m²           Physical Exam

## 2019-01-08 NOTE — PROGRESS NOTES
Assessment/Plan:    First degree hemorrhoids  Treatment for the constipation  Sitz baths  Anusol hydrocortisone suppositories  Examination at the time of the colonoscopy  If significant hemorrhoids are seen then would recommend colorectal surgery evaluation  Slow transit constipation  She is requesting a stool test for ova and parasites  She feels something is moving inside her  I will order this for her  However do not think this is the problem and that she is having more constipation which is chronic  This could be slow transit constipation  Encourage water intake, fiber intake, exercise  Probiotics  Trial of Linzess 72 mcg daily  I discussed with her regarding doing a colonoscopy and she would like to proceed with doing that  We will schedule her  Early satiety  EGD for evaluation  Unclear this is gastroparesis  Check celiac disease as well  Epigastric pain  Ultrasound of the abdomen  Diagnoses and all orders for this visit:    Early satiety  -     Celiac Disease Antibody Profile; Future  -     Case request operating room: ESOPHAGOGASTRODUODENOSCOPY (EGD), COLONOSCOPY; Standing  -     Case request operating room: ESOPHAGOGASTRODUODENOSCOPY (EGD), COLONOSCOPY    Slow transit constipation  -     Ova and parasite examination; Future  -     Probiotic Product (PROBIOTIC DAILY) CAPS; Take 1 capsule by mouth daily for 30 days May use any over the counter brand - align, equate ([employer]), culturelle, reynoso or other store brands  -     Linaclotide (LINZESS) 72 MCG CAPS;  Take 72 mcg by mouth daily for 14 days  -     SUPREP BOWEL PREP KIT 17 5-3 13-1 6 GM/177ML SOLN; Take 1 kit by mouth once for 1 dose Please follow instructions as per the office   -     Case request operating room: ESOPHAGOGASTRODUODENOSCOPY (EGD), COLONOSCOPY; Standing  -     Case request operating room: ESOPHAGOGASTRODUODENOSCOPY (EGD), COLONOSCOPY    First degree hemorrhoids  -     hydrocortisone (ANUSOL-HC) 25 mg suppository; Insert 1 suppository (25 mg total) into the rectum 2 (two) times a day  -     Case request operating room: ESOPHAGOGASTRODUODENOSCOPY (EGD), COLONOSCOPY; Standing  -     Case request operating room: ESOPHAGOGASTRODUODENOSCOPY (EGD), COLONOSCOPY    Epigastric pain  -     Celiac Disease Antibody Profile; Future    Low ferritin  -     Celiac Disease Antibody Profile; Future    Other orders  -     Diet NPO; Sips with meds; Standing  -     Void on call to OR; Standing  -     Insert peripheral IV; Standing          Subjective:      Patient ID: Thor Gonsales is a 46 y o  female  HPI     She presents today for evaluation for colonoscopy  Has increased bloated and gassiness and flatulence  She gained 25 lbs  She has constipation and may go to the bathroom once in 3-4 days  This has been chronic  No bleeding  She had a colonoscopy done 2 years ago  She had an EGD as well  She also feels she has hemorrhoids  These do not bleed but due tend to itch and hurt  She feels something is moving inside  She has heartburn and has intermittent hoarseness  No dysphagia or odynophagia  She takes Pepcid  She feels full early  Her appetite has gone down  However she continues to gain weight  US in 9/18 : normal    Hysterectomy one year ago  tsh is normal    Hb is stable and MCV is normal      The following portions of the patient's history were reviewed and updated as appropriate: allergies, current medications, past family history, past medical history, past social history, past surgical history and problem list     Review of Systems   Constitutional: Negative  HENT: Negative  Eyes: Negative  Respiratory: Negative  Cardiovascular: Negative  Gastrointestinal:        See HPI   Endocrine: Negative  Genitourinary: Negative  Musculoskeletal: Negative  Skin: Negative  Allergic/Immunologic: Negative  Neurological: Negative  Hematological: Negative      Psychiatric/Behavioral: Negative  All other systems reviewed and are negative  Objective:      /76 (BP Location: Left arm, Patient Position: Sitting, Cuff Size: Adult)   Pulse 64   Temp (!) 97 2 °F (36 2 °C) (Tympanic)   Ht 5' 4" (1 626 m)   Wt 78 9 kg (174 lb)   LMP 07/02/2016   BMI 29 87 kg/m²          Physical Exam   Constitutional: She is oriented to person, place, and time  Vital signs are normal  She appears well-developed and well-nourished  HENT:   Head: Normocephalic and atraumatic  Eyes: Pupils are equal, round, and reactive to light  Conjunctivae are normal  No scleral icterus  Neck: Normal range of motion  Cardiovascular: Normal rate, regular rhythm and normal heart sounds  Pulmonary/Chest: Effort normal and breath sounds normal  No respiratory distress  Abdominal: Soft  Normal appearance and bowel sounds are normal  She exhibits no distension, no ascites and no mass  There is no hepatosplenomegaly  There is no tenderness  No hernia  Musculoskeletal: Normal range of motion  Lymphadenopathy:     She has no cervical adenopathy  Neurological: She is alert and oriented to person, place, and time  Skin: Skin is warm  Psychiatric: She has a normal mood and affect   Her behavior is normal  Thought content normal

## 2019-01-09 ENCOUNTER — APPOINTMENT (OUTPATIENT)
Dept: LAB | Facility: HOSPITAL | Age: 49
End: 2019-01-09
Attending: INTERNAL MEDICINE
Payer: COMMERCIAL

## 2019-01-09 DIAGNOSIS — R79.0 LOW FERRITIN: ICD-10-CM

## 2019-01-09 DIAGNOSIS — R10.13 EPIGASTRIC PAIN: ICD-10-CM

## 2019-01-09 DIAGNOSIS — R68.81 EARLY SATIETY: ICD-10-CM

## 2019-01-09 DIAGNOSIS — K59.01 SLOW TRANSIT CONSTIPATION: ICD-10-CM

## 2019-01-09 PROCEDURE — 36415 COLL VENOUS BLD VENIPUNCTURE: CPT

## 2019-01-09 PROCEDURE — 86255 FLUORESCENT ANTIBODY SCREEN: CPT

## 2019-01-09 PROCEDURE — 87209 SMEAR COMPLEX STAIN: CPT

## 2019-01-09 PROCEDURE — 87177 OVA AND PARASITES SMEARS: CPT

## 2019-01-09 PROCEDURE — 83516 IMMUNOASSAY NONANTIBODY: CPT

## 2019-01-09 PROCEDURE — 82784 ASSAY IGA/IGD/IGG/IGM EACH: CPT

## 2019-01-10 LAB
ENDOMYSIUM IGA SER QL: NEGATIVE
GLIADIN PEPTIDE IGA SER-ACNC: 3 UNITS (ref 0–19)
GLIADIN PEPTIDE IGG SER-ACNC: 3 UNITS (ref 0–19)
IGA SERPL-MCNC: 226 MG/DL (ref 87–352)
TTG IGA SER-ACNC: <2 U/ML (ref 0–3)
TTG IGG SER-ACNC: 3 U/ML (ref 0–5)

## 2019-01-10 NOTE — ASSESSMENT & PLAN NOTE
Treatment for the constipation  Sitz baths  Anusol hydrocortisone suppositories  Examination at the time of the colonoscopy  If significant hemorrhoids are seen then would recommend colorectal surgery evaluation

## 2019-01-10 NOTE — ASSESSMENT & PLAN NOTE
She is requesting a stool test for ova and parasites  She feels something is moving inside her  I will order this for her  However do not think this is the problem and that she is having more constipation which is chronic  This could be slow transit constipation  Encourage water intake, fiber intake, exercise  Probiotics  Trial of Linzess 72 mcg daily  I discussed with her regarding doing a colonoscopy and she would like to proceed with doing that  We will schedule her

## 2019-01-11 LAB — O+P STL CONC: NORMAL

## 2019-02-04 ENCOUNTER — TELEPHONE (OUTPATIENT)
Dept: GASTROENTEROLOGY | Facility: CLINIC | Age: 49
End: 2019-02-04

## 2019-02-04 DIAGNOSIS — K59.01 SLOW TRANSIT CONSTIPATION: Primary | ICD-10-CM

## 2019-02-04 NOTE — TELEPHONE ENCOUNTER
Please notify patient:    Prep has been sent as previously documented  OK to use OTC medications for hemorrhoids like Preparation H  Also try Sitz baths as recommended by Dr Penaloza Links at her office visit  Management of her constipation should also help      Anne Gonzales PA-C

## 2019-02-04 NOTE — TELEPHONE ENCOUNTER
mulu pt    Pt called to advise the medication sent for her hemorrhoids was not covered  Also, her procedure prep is not covered

## 2019-02-06 ENCOUNTER — TELEPHONE (OUTPATIENT)
Dept: GASTROENTEROLOGY | Facility: MEDICAL CENTER | Age: 49
End: 2019-02-06

## 2019-02-18 NOTE — TELEPHONE ENCOUNTER
Called patient and explained to her that she is ok to use OTC medication for hemorrhoids  She had some questions about her upcoming EGD/Colonoscopy which I answered  Patient understood

## 2019-03-25 ENCOUNTER — ANESTHESIA EVENT (OUTPATIENT)
Dept: GASTROENTEROLOGY | Facility: MEDICAL CENTER | Age: 49
End: 2019-03-25
Payer: COMMERCIAL

## 2019-03-26 ENCOUNTER — ANESTHESIA (OUTPATIENT)
Dept: GASTROENTEROLOGY | Facility: MEDICAL CENTER | Age: 49
End: 2019-03-26
Payer: COMMERCIAL

## 2019-03-26 ENCOUNTER — HOSPITAL ENCOUNTER (OUTPATIENT)
Facility: MEDICAL CENTER | Age: 49
Setting detail: OUTPATIENT SURGERY
Discharge: HOME | End: 2019-03-26
Attending: INTERNAL MEDICINE | Admitting: INTERNAL MEDICINE
Payer: COMMERCIAL

## 2019-03-26 VITALS
HEIGHT: 62 IN | DIASTOLIC BLOOD PRESSURE: 89 MMHG | SYSTOLIC BLOOD PRESSURE: 164 MMHG | BODY MASS INDEX: 32.02 KG/M2 | WEIGHT: 174 LBS | HEART RATE: 64 BPM | RESPIRATION RATE: 16 BRPM | TEMPERATURE: 97.9 F | OXYGEN SATURATION: 98 %

## 2019-03-26 DIAGNOSIS — K59.01 SLOW TRANSIT CONSTIPATION: ICD-10-CM

## 2019-03-26 DIAGNOSIS — R68.81 EARLY SATIETY: ICD-10-CM

## 2019-03-26 DIAGNOSIS — K64.0 FIRST DEGREE HEMORRHOIDS: ICD-10-CM

## 2019-03-26 PROCEDURE — 45378 DIAGNOSTIC COLONOSCOPY: CPT | Performed by: INTERNAL MEDICINE

## 2019-03-26 PROCEDURE — 88305 TISSUE EXAM BY PATHOLOGIST: CPT | Performed by: PATHOLOGY

## 2019-03-26 PROCEDURE — 88342 IMHCHEM/IMCYTCHM 1ST ANTB: CPT | Performed by: PATHOLOGY

## 2019-03-26 PROCEDURE — 43239 EGD BIOPSY SINGLE/MULTIPLE: CPT | Performed by: INTERNAL MEDICINE

## 2019-03-26 RX ORDER — SODIUM CHLORIDE 9 MG/ML
125 INJECTION, SOLUTION INTRAVENOUS CONTINUOUS
Status: DISCONTINUED | OUTPATIENT
Start: 2019-03-26 | End: 2019-03-26 | Stop reason: HOSPADM

## 2019-03-26 RX ORDER — PROPOFOL 10 MG/ML
INJECTION, EMULSION INTRAVENOUS AS NEEDED
Status: DISCONTINUED | OUTPATIENT
Start: 2019-03-26 | End: 2019-03-26 | Stop reason: SURG

## 2019-03-26 RX ADMIN — PROPOFOL 40 MG: 10 INJECTION, EMULSION INTRAVENOUS at 10:17

## 2019-03-26 RX ADMIN — PROPOFOL 20 MG: 10 INJECTION, EMULSION INTRAVENOUS at 10:29

## 2019-03-26 RX ADMIN — PROPOFOL 40 MG: 10 INJECTION, EMULSION INTRAVENOUS at 10:25

## 2019-03-26 RX ADMIN — PROPOFOL 40 MG: 10 INJECTION, EMULSION INTRAVENOUS at 10:21

## 2019-03-26 RX ADMIN — PROPOFOL 120 MG: 10 INJECTION, EMULSION INTRAVENOUS at 10:07

## 2019-03-26 RX ADMIN — PROPOFOL 30 MG: 10 INJECTION, EMULSION INTRAVENOUS at 10:08

## 2019-03-26 RX ADMIN — SODIUM CHLORIDE 125 ML/HR: 0.9 INJECTION, SOLUTION INTRAVENOUS at 09:47

## 2019-03-26 RX ADMIN — PROPOFOL 30 MG: 10 INJECTION, EMULSION INTRAVENOUS at 10:11

## 2019-03-26 NOTE — ANESTHESIA POSTPROCEDURE EVALUATION
Post-Op Assessment Note    CV Status:  Stable  Pain Score: 0    Pain management: adequate     Mental Status:  Alert and awake   Hydration Status:  Euvolemic   PONV Controlled:  Controlled   Airway Patency:  Patent   Post Op Vitals Reviewed: Yes      Staff: Anesthesiologist           /86 (03/26/19 1040)    Temp      Pulse 66 (03/26/19 1040)   Resp 20 (03/26/19 1040)    SpO2 100 % (03/26/19 1040)

## 2019-03-26 NOTE — ANESTHESIA PREPROCEDURE EVALUATION
Review of Systems/Medical History  Patient summary reviewed  Chart reviewed  No history of anesthetic complications     Cardiovascular  Hyperlipidemia, Hypertension ,    Pulmonary    Comment: H/o laryngeal polyp excision      GI/Hepatic  Negative GI/hepatic ROS          Negative  ROS        Endo/Other    Comment: Reports 50 lb weight gain     GYN    Hysterectomy,   Comment: Prev c section      Hematology   Musculoskeletal    Arthritis     Neurology    Headaches,    Psychology   Anxiety,              Physical Exam    Airway    Mallampati score: II  TM Distance: >3 FB  Neck ROM: full     Dental   No notable dental hx     Cardiovascular  Rhythm: regular, Rate: normal, Cardiovascular exam normal    Pulmonary  Pulmonary exam normal Breath sounds clear to auscultation,     Other Findings        Anesthesia Plan  ASA Score- 2     Anesthesia Type- IV sedation with anesthesia with ASA Monitors  Additional Monitors:   Airway Plan:     Comment: [place] speaking only   Plan Factors-    Induction- intravenous  Postoperative Plan-     Informed Consent- Anesthetic plan and risks discussed with patient

## 2019-04-09 ENCOUNTER — OFFICE VISIT (OUTPATIENT)
Dept: GASTROENTEROLOGY | Facility: MEDICAL CENTER | Age: 49
End: 2019-04-09
Payer: COMMERCIAL

## 2019-04-09 VITALS
HEART RATE: 68 BPM | TEMPERATURE: 97.8 F | DIASTOLIC BLOOD PRESSURE: 78 MMHG | BODY MASS INDEX: 33.13 KG/M2 | SYSTOLIC BLOOD PRESSURE: 124 MMHG | WEIGHT: 180 LBS | HEIGHT: 62 IN

## 2019-04-09 DIAGNOSIS — R14.0 BLOATING: ICD-10-CM

## 2019-04-09 DIAGNOSIS — E66.09 CLASS 1 OBESITY DUE TO EXCESS CALORIES WITH BODY MASS INDEX (BMI) OF 32.0 TO 32.9 IN ADULT, UNSPECIFIED WHETHER SERIOUS COMORBIDITY PRESENT: ICD-10-CM

## 2019-04-09 DIAGNOSIS — R63.5 WEIGHT GAIN: ICD-10-CM

## 2019-04-09 DIAGNOSIS — Z12.11 SCREENING FOR COLON CANCER: ICD-10-CM

## 2019-04-09 DIAGNOSIS — K59.04 CHRONIC IDIOPATHIC CONSTIPATION: ICD-10-CM

## 2019-04-09 DIAGNOSIS — R10.13 EPIGASTRIC PAIN: ICD-10-CM

## 2019-04-09 DIAGNOSIS — R68.81 EARLY SATIETY: ICD-10-CM

## 2019-04-09 DIAGNOSIS — K31.9 MUCOSAL ABNORMALITY OF DUODENUM: ICD-10-CM

## 2019-04-09 DIAGNOSIS — A04.8 H. PYLORI INFECTION: Primary | ICD-10-CM

## 2019-04-09 PROCEDURE — 99215 OFFICE O/P EST HI 40 MIN: CPT | Performed by: PHYSICIAN ASSISTANT

## 2019-04-09 RX ORDER — OMEPRAZOLE 40 MG/1
40 CAPSULE, DELAYED RELEASE ORAL 2 TIMES DAILY
Qty: 28 CAPSULE | Refills: 0 | Status: SHIPPED | OUTPATIENT
Start: 2019-04-09 | End: 2020-04-14 | Stop reason: SDUPTHER

## 2019-04-09 RX ORDER — AMOXICILLIN 500 MG/1
1000 CAPSULE ORAL EVERY 12 HOURS SCHEDULED
Qty: 56 CAPSULE | Refills: 0 | Status: SHIPPED | OUTPATIENT
Start: 2019-04-09 | End: 2019-04-23

## 2019-04-09 RX ORDER — CLARITHROMYCIN 500 MG/1
500 TABLET, COATED ORAL EVERY 12 HOURS SCHEDULED
Qty: 28 TABLET | Refills: 0 | Status: SHIPPED | OUTPATIENT
Start: 2019-04-09 | End: 2019-04-23

## 2019-06-17 ENCOUNTER — OFFICE VISIT (OUTPATIENT)
Dept: GASTROENTEROLOGY | Facility: MEDICAL CENTER | Age: 49
End: 2019-06-17
Payer: COMMERCIAL

## 2019-06-17 ENCOUNTER — DOCUMENTATION (OUTPATIENT)
Dept: GASTROENTEROLOGY | Facility: MEDICAL CENTER | Age: 49
End: 2019-06-17

## 2019-06-17 VITALS
DIASTOLIC BLOOD PRESSURE: 70 MMHG | TEMPERATURE: 98.1 F | SYSTOLIC BLOOD PRESSURE: 124 MMHG | HEART RATE: 56 BPM | WEIGHT: 181.4 LBS | BODY MASS INDEX: 33.18 KG/M2

## 2019-06-17 DIAGNOSIS — A04.8 H. PYLORI INFECTION: Primary | ICD-10-CM

## 2019-06-17 DIAGNOSIS — R10.13 EPIGASTRIC PAIN: ICD-10-CM

## 2019-06-17 DIAGNOSIS — K59.04 CHRONIC IDIOPATHIC CONSTIPATION: ICD-10-CM

## 2019-06-17 PROCEDURE — 99214 OFFICE O/P EST MOD 30 MIN: CPT | Performed by: PHYSICIAN ASSISTANT

## 2019-06-20 ENCOUNTER — APPOINTMENT (OUTPATIENT)
Dept: LAB | Facility: HOSPITAL | Age: 49
End: 2019-06-20
Payer: COMMERCIAL

## 2019-06-20 DIAGNOSIS — A04.8 H. PYLORI INFECTION: ICD-10-CM

## 2019-06-20 PROCEDURE — 87338 HPYLORI STOOL AG IA: CPT

## 2019-06-21 LAB — H PYLORI AG STL QL IA: NEGATIVE

## 2019-11-14 ENCOUNTER — OFFICE VISIT (OUTPATIENT)
Dept: FAMILY MEDICINE CLINIC | Facility: CLINIC | Age: 49
End: 2019-11-14

## 2019-11-14 ENCOUNTER — APPOINTMENT (OUTPATIENT)
Dept: LAB | Facility: CLINIC | Age: 49
End: 2019-11-14
Payer: COMMERCIAL

## 2019-11-14 VITALS
DIASTOLIC BLOOD PRESSURE: 100 MMHG | OXYGEN SATURATION: 99 % | BODY MASS INDEX: 32.57 KG/M2 | SYSTOLIC BLOOD PRESSURE: 158 MMHG | RESPIRATION RATE: 16 BRPM | TEMPERATURE: 97.6 F | HEIGHT: 62 IN | HEART RATE: 65 BPM | WEIGHT: 177 LBS

## 2019-11-14 DIAGNOSIS — Z11.4 SCREENING FOR HIV (HUMAN IMMUNODEFICIENCY VIRUS): ICD-10-CM

## 2019-11-14 DIAGNOSIS — E55.9 MILD VITAMIN D DEFICIENCY: ICD-10-CM

## 2019-11-14 DIAGNOSIS — Z00.00 HEALTHCARE MAINTENANCE: ICD-10-CM

## 2019-11-14 DIAGNOSIS — R10.13 EPIGASTRIC PAIN: ICD-10-CM

## 2019-11-14 DIAGNOSIS — A04.8 H. PYLORI INFECTION: Primary | ICD-10-CM

## 2019-11-14 DIAGNOSIS — I10 ESSENTIAL HYPERTENSION: ICD-10-CM

## 2019-11-14 DIAGNOSIS — K59.01 SLOW TRANSIT CONSTIPATION: ICD-10-CM

## 2019-11-14 DIAGNOSIS — E66.09 CLASS 1 OBESITY DUE TO EXCESS CALORIES WITH BODY MASS INDEX (BMI) OF 32.0 TO 32.9 IN ADULT, UNSPECIFIED WHETHER SERIOUS COMORBIDITY PRESENT: ICD-10-CM

## 2019-11-14 LAB
25(OH)D3 SERPL-MCNC: 23.4 NG/ML (ref 30–100)
ALBUMIN SERPL BCP-MCNC: 4.2 G/DL (ref 3.5–5)
ALP SERPL-CCNC: 61 U/L (ref 46–116)
ALT SERPL W P-5'-P-CCNC: 26 U/L (ref 12–78)
ANION GAP SERPL CALCULATED.3IONS-SCNC: 5 MMOL/L (ref 4–13)
AST SERPL W P-5'-P-CCNC: 15 U/L (ref 5–45)
BASOPHILS # BLD AUTO: 0.03 THOUSANDS/ΜL (ref 0–0.1)
BASOPHILS NFR BLD AUTO: 1 % (ref 0–1)
BILIRUB SERPL-MCNC: 0.57 MG/DL (ref 0.2–1)
BUN SERPL-MCNC: 19 MG/DL (ref 5–25)
CALCIUM SERPL-MCNC: 9.6 MG/DL (ref 8.3–10.1)
CHLORIDE SERPL-SCNC: 107 MMOL/L (ref 100–108)
CHOLEST SERPL-MCNC: 233 MG/DL (ref 50–200)
CO2 SERPL-SCNC: 31 MMOL/L (ref 21–32)
CREAT SERPL-MCNC: 0.83 MG/DL (ref 0.6–1.3)
EOSINOPHIL # BLD AUTO: 0.15 THOUSAND/ΜL (ref 0–0.61)
EOSINOPHIL NFR BLD AUTO: 3 % (ref 0–6)
ERYTHROCYTE [DISTWIDTH] IN BLOOD BY AUTOMATED COUNT: 12.4 % (ref 11.6–15.1)
EST. AVERAGE GLUCOSE BLD GHB EST-MCNC: 117 MG/DL
FERRITIN SERPL-MCNC: 41 NG/ML (ref 8–388)
GFR SERPL CREATININE-BSD FRML MDRD: 82 ML/MIN/1.73SQ M
GLUCOSE P FAST SERPL-MCNC: 83 MG/DL (ref 65–99)
HBA1C MFR BLD: 5.7 % (ref 4.2–6.3)
HCT VFR BLD AUTO: 43.8 % (ref 34.8–46.1)
HDLC SERPL-MCNC: 60 MG/DL
HGB BLD-MCNC: 13.7 G/DL (ref 11.5–15.4)
IMM GRANULOCYTES # BLD AUTO: 0.01 THOUSAND/UL (ref 0–0.2)
IMM GRANULOCYTES NFR BLD AUTO: 0 % (ref 0–2)
IRON SATN MFR SERPL: 26 %
IRON SERPL-MCNC: 95 UG/DL (ref 50–170)
LDLC SERPL CALC-MCNC: 156 MG/DL (ref 0–100)
LYMPHOCYTES # BLD AUTO: 1.57 THOUSANDS/ΜL (ref 0.6–4.47)
LYMPHOCYTES NFR BLD AUTO: 35 % (ref 14–44)
MCH RBC QN AUTO: 29.7 PG (ref 26.8–34.3)
MCHC RBC AUTO-ENTMCNC: 31.3 G/DL (ref 31.4–37.4)
MCV RBC AUTO: 95 FL (ref 82–98)
MONOCYTES # BLD AUTO: 0.46 THOUSAND/ΜL (ref 0.17–1.22)
MONOCYTES NFR BLD AUTO: 10 % (ref 4–12)
NEUTROPHILS # BLD AUTO: 2.3 THOUSANDS/ΜL (ref 1.85–7.62)
NEUTS SEG NFR BLD AUTO: 51 % (ref 43–75)
NONHDLC SERPL-MCNC: 173 MG/DL
NRBC BLD AUTO-RTO: 0 /100 WBCS
PLATELET # BLD AUTO: 183 THOUSANDS/UL (ref 149–390)
PMV BLD AUTO: 13.1 FL (ref 8.9–12.7)
POTASSIUM SERPL-SCNC: 3.8 MMOL/L (ref 3.5–5.3)
PROT SERPL-MCNC: 8.1 G/DL (ref 6.4–8.2)
RBC # BLD AUTO: 4.62 MILLION/UL (ref 3.81–5.12)
SODIUM SERPL-SCNC: 143 MMOL/L (ref 136–145)
TIBC SERPL-MCNC: 367 UG/DL (ref 250–450)
TRIGL SERPL-MCNC: 87 MG/DL
TSH SERPL DL<=0.05 MIU/L-ACNC: 1.99 UIU/ML (ref 0.36–3.74)
WBC # BLD AUTO: 4.52 THOUSAND/UL (ref 4.31–10.16)

## 2019-11-14 PROCEDURE — 3725F SCREEN DEPRESSION PERFORMED: CPT | Performed by: PHYSICIAN ASSISTANT

## 2019-11-14 PROCEDURE — 83550 IRON BINDING TEST: CPT

## 2019-11-14 PROCEDURE — 87389 HIV-1 AG W/HIV-1&-2 AB AG IA: CPT

## 2019-11-14 PROCEDURE — 82728 ASSAY OF FERRITIN: CPT

## 2019-11-14 PROCEDURE — 36415 COLL VENOUS BLD VENIPUNCTURE: CPT

## 2019-11-14 PROCEDURE — 85025 COMPLETE CBC W/AUTO DIFF WBC: CPT

## 2019-11-14 PROCEDURE — 83540 ASSAY OF IRON: CPT

## 2019-11-14 PROCEDURE — 99396 PREV VISIT EST AGE 40-64: CPT | Performed by: PHYSICIAN ASSISTANT

## 2019-11-14 PROCEDURE — 83036 HEMOGLOBIN GLYCOSYLATED A1C: CPT

## 2019-11-14 PROCEDURE — 80053 COMPREHEN METABOLIC PANEL: CPT

## 2019-11-14 PROCEDURE — 82306 VITAMIN D 25 HYDROXY: CPT

## 2019-11-14 PROCEDURE — 80061 LIPID PANEL: CPT

## 2019-11-14 PROCEDURE — 84443 ASSAY THYROID STIM HORMONE: CPT

## 2019-11-14 NOTE — PROGRESS NOTES
Assessment/Plan:  - Will order blood work - CBC, CMP, HgbA1c, lipid panel, TSH, iron panel  Mild vitamin-D deficiency  - Will order vitamin-D level  Will follow up with patient after test is resulted to let her know if she should start taking vitamin-D supplements again  Screening for HIV  - As per USPSTF Guidelines, will obtain one time screening for HIV with HIV antibodies  Patient is aware and agree with above  Hypertension  - Recommended starting lisinopril 10 mg once daily, however patient would like to come back in 2 weeks for blood pressure check before starting medication  Agree with plan  Will have patient return to office in 2 weeks for blood pressure check  If blood pressure is still elevated, will then start lisinopril 10 mg once daily  Class 1 Obesity  - BMI Counseling: Body mass index is 32 37 kg/m²  The BMI is above normal  Nutrition recommendations include reducing portion sizes, decreasing overall calorie intake, 3-5 servings of fruits/vegetables daily, reducing fast food intake, decreasing soda and/or juice intake and moderation in carbohydrate intake  Exercise recommendations include moderate aerobic physical activity for 150 minutes/week  Constipation  - Stable  Continue Linzess 72 mcg once daily as prescribed through Gastroenterology  Continue follow-up with Gastroenterology as scheduled  History of H pylori infection/epigastric pain  - Patient has history of H pylori infection earlier this year and was treated with triple therapy  Patient's symptoms resolved shortly afterwards, but her symptoms have now returned  Will order stool study to check for reinfection of H pylori  Will follow up with patient based on results of the study  Advised patient to follow up with Gastroenterology as scheduled  Patient is due for updated visit  Anemia  - Will check CBC, iron panel to assess updated levels        Diagnoses and all orders for this visit:    H  pylori infection  - H  pylori antigen, stool; Future    Mild vitamin D deficiency  -     Vitamin D 25 hydroxy; Future    Healthcare maintenance  -     CBC and differential; Future  -     Comprehensive metabolic panel; Future  -     Lipid panel; Future  -     TSH, 3rd generation with Free T4 reflex; Future  -     Hemoglobin A1C; Future  -     Iron Panel (Includes Ferritin, Iron Sat%, Iron, and TIBC); Future    Screening for HIV (human immunodeficiency virus)  -     HIV 1/2 AG-AB combo; Future    Essential hypertension    Class 1 obesity due to excess calories with body mass index (BMI) of 32 0 to 32 9 in adult, unspecified whether serious comorbidity present    Slow transit constipation    Epigastric pain        All of patients questions were answered  Patient understands and agrees with the above plan  Return for RTC in 2 weeks for BP check with nurse visit; 2 months for follow up   Carle Paget, PA-C  11/14/19  [facility]           Subjective:     Patient ID: Alpa Kyle is a 44 y o  female with past medical history of constipation, hypertension, vitamin-D deficiency, history of H pylori infection who presents today in office for annual physical     - Patient is a 22-year-old female who presents today for annual physical     Constipation:  Follows with Gastroenterology  Last office visit was 06/17/2019  Currently taking Linzess 72 mcg once daily  Patient notes this medication has been helping her a lot  History of H pylori infection:  Follows with Gastroenterology  Patient had endoscopy performed in March 2019 which showed mild gastritis  Biopsies were positive for H pylori  Patient was treated with triple therapy  Patient notes after receiving the triple therapy, her symptoms did resolve and she was doing well  However, patient notes recently her symptoms have now returned  Patient notes she has a feeling of gas and also feeling that "something is inside me"    Patient notes "food is not good to me"   Patient notes the pain is generalized throughout the entire stomach  Patient notes she has been drinking tea to help with her symptoms  Patient denies taking any medications to help with the symptoms  Patient would like to check if she has H pylori again  Vitamin-D deficiency:  Patient notes she was previously taking vitamin-D supplements  Patient has not been taking them lately and is wondering if she has to start taking the med again  Patient notes recently she has been very tired and sleepy  Patient notes, however, that she is also not getting a great night's rest either  Patient notes that may be why she is tired as well  Patient notes she does snore at night  Patient denies ever having sleep study performed  Anemia:  Patient notes she has a history of anemia  Patient would like to have updated blood work completed  Patient was previously following with heme/Onc  Last office visit was September 2018  Hypertension:  Patient notes she has a history of high blood pressure  Patient notes 3 years ago she was taking lisinopril and hydrochlorothiazide  Patient notes she was put on the medication because she had preeclampsia  Patient notes that she was then taken off her medications  Patient notes last week she did have a headache, but she denies any dizziness, vision changes, chest pain, or lower leg swelling  Vision Problems: Wears glasses; follows with eye doctor regularly    Hearing Loss: No    Life Style  Healthy Diet: Moderate   Regular Exercise: No  Weight Concerns: BMI is 32 37 kg/m2     Tobacco Use: No  Alcohol Use: No  Drug Use: No    Reproductive Health  Contraception: Hysterectomy due to bleeding/fibroids in 2017  Menstrual Problems: Hysterectomy         The following portions of the patient's history were reviewed and updated as appropriate: allergies, current medications, past family history, past medical history, past social history, past surgical history, and problem list      Review of Systems   Constitutional: Positive for fatigue  Negative for appetite change and fever  HENT: Negative for congestion, ear pain, rhinorrhea and sore throat  Eyes: Negative for pain and visual disturbance  Respiratory: Negative for cough, chest tightness and shortness of breath  Cardiovascular: Negative for chest pain, palpitations and leg swelling  Gastrointestinal: Positive for abdominal pain and constipation  Negative for diarrhea, nausea and vomiting  Genitourinary: Negative for difficulty urinating and dysuria  Musculoskeletal: Negative for arthralgias  Skin: Negative for rash  Neurological: Positive for headaches  Negative for dizziness and numbness  Psychiatric/Behavioral: Positive for sleep disturbance  Negative for behavioral problems  The patient is not nervous/anxious  Social History     Tobacco Use   Smoking Status Never Smoker   Smokeless Tobacco Never Used       Allergies: No Known Allergies    Breast Cancer Screening:  - Risks and benefits discussed  Most recent mammogram was performed in June 2019  Results were normal      Osteoporosis Screening:  - Risks and benefits discussed  Patient does not yet meet the requirements to complete this screening  Colorectal Cancer Screening:   - Risks and benefits discussed  Patient had colonoscopy completed in March 2019  It showed a fair prep and was recommended to have repeat colonoscopy in 5 years time  Cervical Cancer Screening:  - Risks and benefits discussed  Follows with Ob gyn regularly  STD Testing:  - Risks and benefits discussed        Objective:    Vitals:  /100   Pulse 65   Temp 97 6 °F (36 4 °C) (Temporal)   Resp 16   Ht 5' 2" (1 575 m)   Wt 80 3 kg (177 lb)   LMP 07/02/2016   SpO2 99%   BMI 32 37 kg/m²     Current Outpatient Medications   Medication Sig Dispense Refill    linaCLOtide (LINZESS) 72 MCG CAPS Take 1 capsule by mouth daily 90 capsule 1    citalopram (CeleXA) 20 mg tablet Take by mouth      famotidine (PEPCID) 20 mg tablet Take 1 tablet (20 mg total) by mouth daily (Patient not taking: Reported on 6/17/2019) 90 tablet 0    hydrocortisone (ANUSOL-HC) 25 mg suppository Insert 1 suppository (25 mg total) into the rectum 2 (two) times a day (Patient not taking: Reported on 6/17/2019) 12 suppository 0    omeprazole (PriLOSEC) 40 MG capsule Take 1 capsule (40 mg total) by mouth 2 (two) times a day for 14 days 28 capsule 0    Probiotic Product (PROBIOTIC DAILY PO) Take by mouth daily      RA VITAMIN D-3 5000 units capsule Take 1 capsule (5,000 Units total) by mouth daily (Patient not taking: Reported on 11/14/2019) 90 capsule 1     No current facility-administered medications for this visit  Physical Exam   Constitutional: She is oriented to person, place, and time  She appears well-developed and well-nourished  No distress  HENT:   Head: Normocephalic and atraumatic  Right Ear: External ear normal    Left Ear: External ear normal    Nose: Nose normal    Mouth/Throat: Uvula is midline, oropharynx is clear and moist and mucous membranes are normal    Eyes: Pupils are equal, round, and reactive to light  Conjunctivae and EOM are normal    Neck: Normal range of motion  Neck supple  Cardiovascular: Normal rate, regular rhythm, normal heart sounds and intact distal pulses  No murmur heard  Pulmonary/Chest: Effort normal and breath sounds normal  She has no wheezes  Abdominal: Soft  Bowel sounds are normal  There is no tenderness  Musculoskeletal: Normal range of motion  Neurological: She is alert and oriented to person, place, and time  Skin: Skin is warm and dry  Capillary refill takes less than 2 seconds  Psychiatric: She has a normal mood and affect  Her speech is normal and behavior is normal    Nursing note and vitals reviewed  - Utilized [employer] for translation

## 2019-11-15 LAB — HIV 1+2 AB+HIV1 P24 AG SERPL QL IA: NORMAL

## 2019-11-17 DIAGNOSIS — E55.9 MILD VITAMIN D DEFICIENCY: ICD-10-CM

## 2019-11-17 PROBLEM — E66.811 CLASS 1 OBESITY DUE TO EXCESS CALORIES WITH BODY MASS INDEX (BMI) OF 32.0 TO 32.9 IN ADULT: Status: ACTIVE | Noted: 2019-11-17

## 2019-11-17 PROBLEM — E66.09 CLASS 1 OBESITY DUE TO EXCESS CALORIES WITH BODY MASS INDEX (BMI) OF 32.0 TO 32.9 IN ADULT: Status: ACTIVE | Noted: 2019-11-17

## 2019-11-17 RX ORDER — CHOLECALCIFEROL (VITAMIN D3) 125 MCG
5000 CAPSULE ORAL DAILY
Qty: 90 CAPSULE | Refills: 1 | Status: SHIPPED | OUTPATIENT
Start: 2019-11-17 | End: 2022-04-27 | Stop reason: SDUPTHER

## 2019-11-20 ENCOUNTER — APPOINTMENT (OUTPATIENT)
Dept: LAB | Facility: CLINIC | Age: 49
End: 2019-11-20
Payer: COMMERCIAL

## 2019-11-20 DIAGNOSIS — A04.8 H. PYLORI INFECTION: ICD-10-CM

## 2019-11-20 PROCEDURE — 87338 HPYLORI STOOL AG IA: CPT

## 2019-11-21 LAB — H PYLORI AG STL QL IA: NEGATIVE

## 2019-12-18 ENCOUNTER — OFFICE VISIT (OUTPATIENT)
Dept: BARIATRICS | Facility: CLINIC | Age: 49
End: 2019-12-18
Payer: COMMERCIAL

## 2019-12-18 VITALS
DIASTOLIC BLOOD PRESSURE: 62 MMHG | BODY MASS INDEX: 33.4 KG/M2 | RESPIRATION RATE: 18 BRPM | SYSTOLIC BLOOD PRESSURE: 102 MMHG | HEIGHT: 62 IN | WEIGHT: 181.5 LBS | HEART RATE: 63 BPM | TEMPERATURE: 97.9 F

## 2019-12-18 DIAGNOSIS — E66.09 CLASS 1 OBESITY DUE TO EXCESS CALORIES WITH BODY MASS INDEX (BMI) OF 32.0 TO 32.9 IN ADULT, UNSPECIFIED WHETHER SERIOUS COMORBIDITY PRESENT: Primary | ICD-10-CM

## 2019-12-18 DIAGNOSIS — R00.2 HEART PALPITATIONS: ICD-10-CM

## 2019-12-18 DIAGNOSIS — E55.9 MILD VITAMIN D DEFICIENCY: ICD-10-CM

## 2019-12-18 DIAGNOSIS — E78.5 HYPERLIPIDEMIA, UNSPECIFIED HYPERLIPIDEMIA TYPE: ICD-10-CM

## 2019-12-18 DIAGNOSIS — M19.90 OSTEOARTHRITIS: ICD-10-CM

## 2019-12-18 DIAGNOSIS — R79.0 LOW FERRITIN: ICD-10-CM

## 2019-12-18 DIAGNOSIS — F41.9 ANXIETY: ICD-10-CM

## 2019-12-18 DIAGNOSIS — K59.01 SLOW TRANSIT CONSTIPATION: ICD-10-CM

## 2019-12-18 DIAGNOSIS — I10 ESSENTIAL HYPERTENSION: ICD-10-CM

## 2019-12-18 PROCEDURE — 99243 OFF/OP CNSLTJ NEW/EST LOW 30: CPT | Performed by: FAMILY MEDICINE

## 2019-12-18 NOTE — PROGRESS NOTES
Assessment/Plan:    Diagnoses and all orders for this visit:    Class 1 obesity due to excess calories with body mass index (BMI) of 32 0 to 32 9 in adult, unspecified whether serious comorbidity present    Essential hypertension    Osteoarthritis    Anxiety    Heart palpitations    Hyperlipidemia, unspecified hyperlipidemia type    Mild vitamin D deficiency    Slow transit constipation    Low ferritin        -Discussed options of [facility]-Intensive Lifestyle Intervention Program, Very Low Calorie Diet-VLCD and Conservative Program and the role of weight loss medications   -Initial weight loss goal of 5-10% weight loss for improved health  -Screening labs- 11/14/19  -Patient is interested in pursuing Conservative Program    Goals:  Food log (ie ) www myfitnesspal com,sparkpeople  com,loseit com,calorieking  com,etc  baritastic  No sugary beverages  At least 64oz of water daily  Increase physical activity by 10 minutes daily  Gradually increase physical activity to a goal of 5 days per week for 30 minutes of MODERATE intensity PLUS 2 days per week of FULL BODY resistance training  5-10 servings of fruits and vegetables per day, 25-35 grams of dietary fiber per day and 0292-7421 calories per day  Discussed in detail to increase her fruits and vegetables content  45 minute visit, >50% face-to-face time spent counseling patient on nonsurgical interventions for the treatment of excess weight  Discussed in detail nonsurgical options including intensive lifestyle intervention program, very low-calorie diet program and conservative program   Discussed the role of weight loss medications  Counseled patient on diet behavior and exercise modification for weight loss  Follow up in approximately 2 months with Non-Surgical Physician/Advanced Practitioner      Subjective:   Chief Complaint   Patient presents with    Consult     mwm consult       Patient ID: Han Hooks  is a 44 y o  female with excess weight/obesity here to pursue weight management  Past Medical History:   Diagnosis Date    Hyperlipidemia     Hypertension     Sinus pressure        HPI:  Obesity/Excess Weight:  Severity: class 1  Onset:  Since 2 years ago when she had hysterectomy 30lb gained   Modifiers: Diet and Exercise and Prescription Weight Loss Medications - Dr Young Grandchild taking B12 injection, welllbutrin but developed dry mouth and lost 3lbs  Contributing factors: Poor Food Choices, Insufficient Physical Activity and Lack of knowledge of appropriate lifestyle changes  Associated symptoms: body image issues and decreased self esteem      She tried medicines- thinks maybe it was Wellbutrin and not interested in medicines again, she would be interested in "something natural that she can take" for weight loss  Discussed this and lack of evidence in this for weight loss  Goals: 140lbs  Hydration:  Alcohol: no  Smoking: no  Exercise: no  Sleep: 6hrs  STOP bang: 3/8    Social:  Lives with  and 2 daughters  Works part time in a cafeteria    BF- home made shake  S- coffee w milk lactose free 1%, with 5 crackers  L- rice and bean or chicken liver  S- deborah tea, velvita crackers   D- skips      The following portions of the patient's history were reviewed and updated as appropriate: allergies, current medications, past family history, past medical history, past social history, past surgical history and problem list     Review of Systems   Constitutional: Negative for activity change and appetite change  HENT: Negative  Respiratory: Negative  Cardiovascular: Negative  Gastrointestinal: Negative  Genitourinary: Negative  Musculoskeletal: Negative for arthralgias  Skin: Negative for rash  Neurological: Negative  Psychiatric/Behavioral: Negative          Objective:    /62 (BP Location: Left arm, Patient Position: Sitting, Cuff Size: Large)   Pulse 63   Temp 97 9 °F (36 6 °C) (Tympanic)   Resp 18   Ht 5' 1 5" (1 562 m)   Wt 82 3 kg (181 lb 8 oz)   LMP 07/02/2016   BMI 33 74 kg/m²     Physical Exam    Constitutional   General appearance: Abnormal   well developed and obese  Eyes No conjunctival pallor  Ears, Nose, Mouth, and Throat Oral mucosa moist    Pulmonary   Respiratory effort: No increased work of breathing or signs of respiratory distress  Auscultation of lungs: Clear to auscultation, equal breath sounds bilaterally, no wheezes, no rales, no rhonci  Cardiovascular   Auscultation of heart: Normal rate and rhythm, normal S1 and S2, without murmurs  Examination of extremities for edema and/or varicosities: Normal   no edema  Abdomen   Abdomen: Abnormal   The abdomen was obese  Bowel sounds were normal  The abdomen was soft and nontender     Musculoskeletal   Gait and station: Normal     Psychiatric   Orientation to person, place and time: Normal     Affect: appropriate

## 2020-01-14 ENCOUNTER — OFFICE VISIT (OUTPATIENT)
Dept: FAMILY MEDICINE CLINIC | Facility: CLINIC | Age: 50
End: 2020-01-14

## 2020-01-14 ENCOUNTER — TELEPHONE (OUTPATIENT)
Dept: FAMILY MEDICINE CLINIC | Facility: CLINIC | Age: 50
End: 2020-01-14

## 2020-01-14 VITALS
WEIGHT: 180 LBS | SYSTOLIC BLOOD PRESSURE: 128 MMHG | TEMPERATURE: 98 F | DIASTOLIC BLOOD PRESSURE: 90 MMHG | HEART RATE: 64 BPM | RESPIRATION RATE: 18 BRPM | HEIGHT: 62 IN | OXYGEN SATURATION: 98 % | BODY MASS INDEX: 33.13 KG/M2

## 2020-01-14 DIAGNOSIS — M79.672 PAIN IN BOTH FEET: Primary | ICD-10-CM

## 2020-01-14 DIAGNOSIS — G47.62 NOCTURNAL LEG CRAMPS: ICD-10-CM

## 2020-01-14 DIAGNOSIS — R06.83 SNORING: ICD-10-CM

## 2020-01-14 DIAGNOSIS — Z23 NEED FOR INFLUENZA VACCINATION: ICD-10-CM

## 2020-01-14 DIAGNOSIS — J38.1 VOCAL CORD POLYPS: ICD-10-CM

## 2020-01-14 DIAGNOSIS — M79.671 PAIN IN BOTH FEET: Primary | ICD-10-CM

## 2020-01-14 PROCEDURE — 1036F TOBACCO NON-USER: CPT | Performed by: FAMILY MEDICINE

## 2020-01-14 PROCEDURE — 99214 OFFICE O/P EST MOD 30 MIN: CPT | Performed by: FAMILY MEDICINE

## 2020-01-14 PROCEDURE — 3008F BODY MASS INDEX DOCD: CPT | Performed by: FAMILY MEDICINE

## 2020-01-14 RX ORDER — MELOXICAM 7.5 MG/1
7.5 TABLET ORAL DAILY
Qty: 30 TABLET | Refills: 5 | Status: SHIPPED | OUTPATIENT
Start: 2020-01-14

## 2020-01-14 NOTE — ASSESSMENT & PLAN NOTE
Stretching exercises discussed  Proper footwear discussed  Avoid walking barefoot   Referred to Podiatry   Meloxicam 7 5 mg PRN

## 2020-01-14 NOTE — PROGRESS NOTES
Assessment/Plan:    Snoring  Referred for sleep study    Pain in both feet  Stretching exercises discussed  Proper footwear discussed  Avoid walking barefoot   Referred to Podiatry   Meloxicam 7 5 mg PRN        Diagnoses and all orders for this visit:    Pain in both feet  -     Ambulatory referral to Podiatry; Future  -     XR foot 3+ vw left; Future  -     XR foot 3+ vw right; Future  -     meloxicam (MOBIC) 7 5 mg tablet; Take 1 tablet (7 5 mg total) by mouth daily    Vocal cord polyps  -     Ambulatory Referral to Otolaryngology; Future    Snoring  -     Ambulatory referral to Sleep Medicine; Future    Nocturnal leg cramps    Need for influenza vaccination  -     influenza vaccine, [id number], quadrivalent, recombinant, PF, 0 5 mL, for patients 18 yr+ (FLUBLOK)          Subjective:      Patient ID: Naeem Wagner is a 44 y o  female  43 yo  female with HTN, Hyperlipidemia, self reported history of vocal cord polyps, here today with the following complains:  1- L foot pain for about 2 weeks, tried [employer] without relief, has not tried any oral mediations  Spends about 4 hours a day at work on her feet  Pain is constant, worse when she first gets up to walk, described as an ache, often accompanied by cramps in her calf  Denies numbness, tingling, decreased strength  2- Chronic R foot pain, which had improved, however feels has gotten worse over the last week  Described as cramping of her foot and toes, worse at night   3-  Snoring wakes up tired with headache  Has been told by her  she snores very loudly       The following portions of the patient's history were reviewed and updated as appropriate: She  has a past medical history of Hyperlipidemia, Hypertension, and Sinus pressure    She   Patient Active Problem List    Diagnosis Date Noted    Snoring 01/14/2020    Pain in both feet 01/14/2020    Class 1 obesity due to excess calories with body mass index (BMI) of 32 0 to 32 9 in adult 2019    H  pylori infection 2019    Low ferritin 2019    Slow transit constipation 2019    Fatigue 10/08/2018    History of colonoscopy 10/08/2018    Overactive bladder 10/04/2018    Anxiety 2017    Chronic neck pain 2017    Chronic upper back pain 2017    Early satiety 2016    First degree hemorrhoids 2016    Constipation 10/14/2016    Epigastric pain 10/14/2016    Nocturnal leg cramps 2016    Heart palpitations 2016    Mild vitamin D deficiency 2016    Ovarian cyst, complex 2015    Headache 2015    Hyperlipidemia 2015    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    Osteoarthritis 2014    Essential hypertension 10/12/2012    Herpes simplex infection 2012     She  has a past surgical history that includes  section, low transverse; Laparoscopic total hysterectomy (2017); Larynx surgery; pr esophagogastroduodenoscopy transoral diagnostic (N/A, 3/26/2019); pr colonoscopy flx dx w/collj spec when pfrmd (N/A, 3/26/2019); and Tubal ligation  Her family history includes Hypertension in her mother  She  reports that she has never smoked  She has never used smokeless tobacco  She reports that she does not drink alcohol or use drugs    Current Outpatient Medications   Medication Sig Dispense Refill    linaCLOtide (LINZESS) 72 MCG CAPS Take 1 capsule by mouth daily 90 capsule 1    Probiotic Product (PROBIOTIC DAILY PO) Take by mouth daily      RA VITAMIN D-3 125 MCG (5000 UT) capsule Take 1 capsule (5,000 Units total) by mouth daily 90 capsule 1    citalopram (CeleXA) 20 mg tablet Take by mouth      famotidine (PEPCID) 20 mg tablet Take 1 tablet (20 mg total) by mouth daily (Patient not taking: Reported on 2020) 90 tablet 0    hydrocortisone (ANUSOL-HC) 25 mg suppository Insert 1 suppository (25 mg total) into the rectum 2 (two) times a day (Patient not taking: Reported on 1/14/2020) 12 suppository 0    meloxicam (MOBIC) 7 5 mg tablet Take 1 tablet (7 5 mg total) by mouth daily 30 tablet 5    omeprazole (PriLOSEC) 40 MG capsule Take 1 capsule (40 mg total) by mouth 2 (two) times a day for 14 days 28 capsule 0     No current facility-administered medications for this visit  Current Outpatient Medications on File Prior to Visit   Medication Sig    linaCLOtide (LINZESS) 72 MCG CAPS Take 1 capsule by mouth daily    Probiotic Product (PROBIOTIC DAILY PO) Take by mouth daily    RA VITAMIN D-3 125 MCG (5000 UT) capsule Take 1 capsule (5,000 Units total) by mouth daily    citalopram (CeleXA) 20 mg tablet Take by mouth    famotidine (PEPCID) 20 mg tablet Take 1 tablet (20 mg total) by mouth daily (Patient not taking: Reported on 1/14/2020)    hydrocortisone (ANUSOL-HC) 25 mg suppository Insert 1 suppository (25 mg total) into the rectum 2 (two) times a day (Patient not taking: Reported on 1/14/2020)    omeprazole (PriLOSEC) 40 MG capsule Take 1 capsule (40 mg total) by mouth 2 (two) times a day for 14 days     No current facility-administered medications on file prior to visit       Review of Systems   Musculoskeletal: Positive for arthralgias  All other systems reviewed and are negative  Objective:      /90 (BP Location: Right arm, Patient Position: Sitting, Cuff Size: Large)   Pulse 64   Temp 98 °F (36 7 °C) (Temporal)   Resp 18   Ht 5' 2" (1 575 m)   Wt 81 6 kg (180 lb)   LMP 07/02/2016   SpO2 98%   BMI 32 92 kg/m²          Physical Exam   Constitutional: She is oriented to person, place, and time  She appears well-developed  HENT:   Head: Normocephalic  Right Ear: External ear normal    Left Ear: External ear normal    Nose: Nose normal    Mouth/Throat: Oropharynx is clear and moist    Eyes: Pupils are equal, round, and reactive to light  Conjunctivae and EOM are normal    Neck: Normal range of motion  Neck supple   No thyromegaly present  Cardiovascular: Normal rate, regular rhythm and normal heart sounds  Pulmonary/Chest: Effort normal and breath sounds normal    Abdominal: Soft  There is no tenderness  There is no rebound and no guarding  Musculoskeletal: Normal range of motion  She exhibits tenderness  Left foot: There is tenderness  Feet:    Tender to palpation    Neurological: She is alert and oriented to person, place, and time  She has normal reflexes  Skin: Skin is dry  Psychiatric: She has a normal mood and affect  Nursing note and vitals reviewed

## 2020-01-14 NOTE — TELEPHONE ENCOUNTER
LM on   Podiatry [facility]([phone]) is on 1/16/20 at 10 am at [facility] [address], [facility] [address], [place]  ENT apt([phone]) is on 1/21/20 at 9:45 am at [facility]    Sleep dr BYRD([phone]) is on 3/24/20 at 10 am at [address] [place] [address], [address]

## 2020-01-16 ENCOUNTER — HOSPITAL ENCOUNTER (OUTPATIENT)
Dept: RADIOLOGY | Facility: HOSPITAL | Age: 50
Discharge: HOME | End: 2020-01-16
Payer: COMMERCIAL

## 2020-01-16 DIAGNOSIS — M79.671 PAIN IN BOTH FEET: ICD-10-CM

## 2020-01-16 DIAGNOSIS — M79.672 PAIN IN BOTH FEET: ICD-10-CM

## 2020-01-16 PROCEDURE — 73630 X-RAY EXAM OF FOOT: CPT

## 2020-01-17 NOTE — TELEPHONE ENCOUNTER
Irene talked to pt and she went to hospital and they told her she only had xrays to be done and no other apts yesterday  Neelima Silvestre had to explain that office was the building next to hospital and pt wanted Podiatry apt rescheduled and to mail all info again    New apt is on 1/27/20 at 1:30 pm

## 2020-01-22 NOTE — TELEPHONE ENCOUNTER
Pt attended ENT apt on 1/21/20 and they will fax note when it's completed by dr Melecio Olvera states it could take a week  Podiatry apt still on 1/27/20 and Sleep  apt is still on 3/24/20

## 2020-02-11 NOTE — TELEPHONE ENCOUNTER
Pt changed podiatry to 3/16/20 at [address] [place] [address], [address], [place] and Sleep dr BIRD([phone]) is on 3/24/20 at 10 am at [address] [place] [address]

## 2020-02-18 ENCOUNTER — OFFICE VISIT (OUTPATIENT)
Dept: BARIATRICS | Facility: CLINIC | Age: 50
End: 2020-02-18
Payer: COMMERCIAL

## 2020-02-18 VITALS
TEMPERATURE: 97.7 F | SYSTOLIC BLOOD PRESSURE: 140 MMHG | HEART RATE: 68 BPM | WEIGHT: 175.2 LBS | DIASTOLIC BLOOD PRESSURE: 90 MMHG | HEIGHT: 62 IN | BODY MASS INDEX: 32.24 KG/M2

## 2020-02-18 DIAGNOSIS — I10 ESSENTIAL HYPERTENSION: ICD-10-CM

## 2020-02-18 DIAGNOSIS — E66.09 CLASS 1 OBESITY DUE TO EXCESS CALORIES WITH BODY MASS INDEX (BMI) OF 32.0 TO 32.9 IN ADULT, UNSPECIFIED WHETHER SERIOUS COMORBIDITY PRESENT: Primary | ICD-10-CM

## 2020-02-18 DIAGNOSIS — E55.9 MILD VITAMIN D DEFICIENCY: ICD-10-CM

## 2020-02-18 DIAGNOSIS — E78.5 HYPERLIPIDEMIA, UNSPECIFIED HYPERLIPIDEMIA TYPE: ICD-10-CM

## 2020-02-18 PROBLEM — M79.673 FOOT PAIN: Status: ACTIVE | Noted: 2020-02-13

## 2020-02-18 PROCEDURE — 3080F DIAST BP >= 90 MM HG: CPT | Performed by: FAMILY MEDICINE

## 2020-02-18 PROCEDURE — 99214 OFFICE O/P EST MOD 30 MIN: CPT | Performed by: FAMILY MEDICINE

## 2020-02-18 PROCEDURE — 3008F BODY MASS INDEX DOCD: CPT | Performed by: FAMILY MEDICINE

## 2020-02-18 PROCEDURE — 3077F SYST BP >= 140 MM HG: CPT | Performed by: FAMILY MEDICINE

## 2020-02-18 PROCEDURE — 1036F TOBACCO NON-USER: CPT | Performed by: FAMILY MEDICINE

## 2020-02-18 RX ORDER — PREDNISONE 10 MG/1
TABLET ORAL DAILY
COMMUNITY
End: 2020-07-08 | Stop reason: ALTCHOICE

## 2020-02-18 RX ORDER — METHYLPREDNISOLONE 4 MG/1
TABLET ORAL
COMMUNITY
End: 2020-07-08 | Stop reason: ALTCHOICE

## 2020-02-18 NOTE — PROGRESS NOTES
Assessment/Plan:    No problem-specific Assessment & Plan notes found for this encounter  Diagnoses and all orders for this visit:    Class 1 obesity due to excess calories with body mass index (BMI) of 32 0 to 32 9 in adult, unspecified whether serious comorbidity present    Essential hypertension    Hyperlipidemia, unspecified hyperlipidemia type    Mild vitamin D deficiency      -Patient is pursuing Conservative Program  -Initial weight loss goal of 5-10% weight loss for improved health  -Screening labs - 11/14/19  - she is more into "natural" remedies and avoid medicines  Initial: 181 5lbs  Current: 175 2lbs  Change: -6 3lbs  Goal: 140lbs    Goals:  Food log (ie ) www myfitnesspal com,sparkpeople  com,loseit com,calorieking  com,etc  baritastic  No sugary beverages  At least 64oz of water daily  Increase physical activity by 10 minutes daily  Gradually increase physical activity to a goal of 5 days per week for 30 minutes of MODERATE intensity PLUS 2 days per week of FULL BODY resistance training  5-10 servings of fruits and vegetables per day, 25-35 grams of dietary fiber per day and 8619-7896 calories per day  She has increased her vegetables and fiber content as recommended  Follow up in approximately 3 months with Non-Surgical Physician/Advanced Practitioner  Subjective:   Chief Complaint   Patient presents with    Follow-up     mwm follow up       Patient ID: Dorothea Edwards  is a 44 y o  female with excess weight/obesity here to pursue weight management  Patient is pursuing Conservative Program      HPI     Pt presents for 2 months follow up  Lost -6 3lbs    Nutrition: 2780-7958 cals a day, following meal plan provided  Exercise: walking more but plans to start the GYM     Anxiety stable  Off of citalopram    She is only interested in "natural" remedies  Doing well  States feels comfortable following plan    Denies hunger, or anxiety      The following portions of the patient's history were reviewed and updated as appropriate: allergies, current medications, past family history, past medical history, past social history, past surgical history and problem list     Review of Systems   Constitutional: Negative for activity change and appetite change  HENT: Negative  Respiratory: Negative  Cardiovascular: Negative  Gastrointestinal: Negative  Genitourinary: Negative  Musculoskeletal: Negative for arthralgias  Skin: Negative for rash  Neurological: Negative  Psychiatric/Behavioral: Negative  Objective:    /90 (BP Location: Left arm, Patient Position: Sitting, Cuff Size: Large)   Pulse 68   Temp 97 7 °F (36 5 °C)   Ht 5' 1 5" (1 562 m)   Wt 79 5 kg (175 lb 3 2 oz)   LMP 07/02/2016   BMI 32 57 kg/m²      Physical Exam  Constitutional   General appearance: Abnormal   well developed and obese  Eyes No conjunctival pallor  Ears, Nose, Mouth, and Throat Oral mucosa moist    Pulmonary   Respiratory effort: No increased work of breathing or signs of respiratory distress  Auscultation of lungs: Clear to auscultation, equal breath sounds bilaterally, no wheezes, no rales, no rhonci  Cardiovascular   Auscultation of heart: Normal rate and rhythm, normal S1 and S2, without murmurs  Examination of extremities for edema and/or varicosities: Normal   no edema  Abdomen   Abdomen: Abnormal   The abdomen was obese  Bowel sounds were normal  The abdomen was soft and nontender     Musculoskeletal   Gait and station: Normal     Psychiatric   Orientation to person, place and time: Normal     Affect: appropriate

## 2020-03-16 NOTE — TELEPHONE ENCOUNTER
Pt says she doesn't need apt, she went to a different office at [address] [place] [address] already

## 2020-04-14 ENCOUNTER — TELEMEDICINE (OUTPATIENT)
Dept: FAMILY MEDICINE CLINIC | Facility: CLINIC | Age: 50
End: 2020-04-14

## 2020-04-14 DIAGNOSIS — A04.8 H. PYLORI INFECTION: ICD-10-CM

## 2020-04-14 DIAGNOSIS — M79.672 PAIN IN BOTH FEET: ICD-10-CM

## 2020-04-14 DIAGNOSIS — E66.9 OBESITY (BMI 35.0-39.9 WITHOUT COMORBIDITY): ICD-10-CM

## 2020-04-14 DIAGNOSIS — M79.671 PAIN IN BOTH FEET: ICD-10-CM

## 2020-04-14 DIAGNOSIS — M21.6X2 PLANTAR FAT PAD ATROPHY OF LEFT FOOT: Primary | ICD-10-CM

## 2020-04-14 PROCEDURE — G2012 BRIEF CHECK IN BY MD/QHP: HCPCS | Performed by: FAMILY MEDICINE

## 2020-04-14 PROCEDURE — T1015 CLINIC SERVICE: HCPCS | Performed by: FAMILY MEDICINE

## 2020-04-14 RX ORDER — OMEPRAZOLE 40 MG/1
40 CAPSULE, DELAYED RELEASE ORAL DAILY
Qty: 90 CAPSULE | Refills: 0 | Status: SHIPPED | OUTPATIENT
Start: 2020-04-14 | End: 2020-07-08 | Stop reason: ALTCHOICE

## 2020-05-05 ENCOUNTER — TELEMEDICINE (OUTPATIENT)
Dept: BARIATRICS | Facility: CLINIC | Age: 50
End: 2020-05-05
Payer: COMMERCIAL

## 2020-05-05 VITALS — WEIGHT: 180 LBS | BODY MASS INDEX: 33.13 KG/M2 | HEIGHT: 62 IN

## 2020-05-05 DIAGNOSIS — I10 ESSENTIAL HYPERTENSION: ICD-10-CM

## 2020-05-05 DIAGNOSIS — F41.9 ANXIETY: ICD-10-CM

## 2020-05-05 DIAGNOSIS — E66.9 OBESITY (BMI 35.0-39.9 WITHOUT COMORBIDITY): Primary | ICD-10-CM

## 2020-05-05 DIAGNOSIS — E78.5 HYPERLIPIDEMIA, UNSPECIFIED HYPERLIPIDEMIA TYPE: ICD-10-CM

## 2020-05-05 DIAGNOSIS — M19.90 OSTEOARTHRITIS: ICD-10-CM

## 2020-05-05 PROCEDURE — 99214 OFFICE O/P EST MOD 30 MIN: CPT | Performed by: FAMILY MEDICINE

## 2020-05-11 ENCOUNTER — HOSPITAL ENCOUNTER (OUTPATIENT)
Dept: NON INVASIVE DIAGNOSTICS | Facility: HOSPITAL | Age: 50
Discharge: HOME | End: 2020-05-11
Attending: FAMILY MEDICINE
Payer: COMMERCIAL

## 2020-05-11 DIAGNOSIS — E66.9 OBESITY (BMI 35.0-39.9 WITHOUT COMORBIDITY): ICD-10-CM

## 2020-05-11 DIAGNOSIS — I10 ESSENTIAL HYPERTENSION: ICD-10-CM

## 2020-05-11 LAB
ATRIAL RATE: 57 BPM
P AXIS: 57 DEGREES
PR INTERVAL: 172 MS
QRS AXIS: 30 DEGREES
QRSD INTERVAL: 88 MS
QT INTERVAL: 402 MS
QTC INTERVAL: 391 MS
T WAVE AXIS: 59 DEGREES
VENTRICULAR RATE: 57 BPM

## 2020-05-11 PROCEDURE — 93010 ELECTROCARDIOGRAM REPORT: CPT | Performed by: INTERNAL MEDICINE

## 2020-05-11 PROCEDURE — 93005 ELECTROCARDIOGRAM TRACING: CPT

## 2020-07-08 ENCOUNTER — OFFICE VISIT (OUTPATIENT)
Dept: BARIATRICS | Facility: CLINIC | Age: 50
End: 2020-07-08
Payer: COMMERCIAL

## 2020-07-08 VITALS
BODY MASS INDEX: 33.64 KG/M2 | WEIGHT: 182.8 LBS | DIASTOLIC BLOOD PRESSURE: 80 MMHG | HEART RATE: 69 BPM | TEMPERATURE: 96.6 F | HEIGHT: 62 IN | SYSTOLIC BLOOD PRESSURE: 122 MMHG

## 2020-07-08 DIAGNOSIS — E66.9 OBESITY (BMI 35.0-39.9 WITHOUT COMORBIDITY): Primary | ICD-10-CM

## 2020-07-08 DIAGNOSIS — M19.90 OSTEOARTHRITIS: ICD-10-CM

## 2020-07-08 DIAGNOSIS — I10 ESSENTIAL HYPERTENSION: ICD-10-CM

## 2020-07-08 PROCEDURE — 3008F BODY MASS INDEX DOCD: CPT | Performed by: FAMILY MEDICINE

## 2020-07-08 PROCEDURE — 3079F DIAST BP 80-89 MM HG: CPT | Performed by: FAMILY MEDICINE

## 2020-07-08 PROCEDURE — 99214 OFFICE O/P EST MOD 30 MIN: CPT | Performed by: FAMILY MEDICINE

## 2020-07-08 PROCEDURE — 1036F TOBACCO NON-USER: CPT | Performed by: FAMILY MEDICINE

## 2020-07-08 PROCEDURE — 3074F SYST BP LT 130 MM HG: CPT | Performed by: FAMILY MEDICINE

## 2020-07-08 RX ORDER — PHENTERMINE HYDROCHLORIDE 37.5 MG/1
TABLET ORAL
Qty: 30 TABLET | Refills: 2 | Status: SHIPPED | OUTPATIENT
Start: 2020-07-08 | End: 2020-09-08 | Stop reason: SDUPTHER

## 2020-07-08 NOTE — PROGRESS NOTES
Assessment/Plan:    No problem-specific Assessment & Plan notes found for this encounter  Diagnoses and all orders for this visit:    Obesity (BMI 35 0-39 9 without comorbidity)  -     phentermine (ADIPEX-P) 37 5 MG tablet; Take 1/2 tab daily before breakfast for 2 weeks then if tolerated take 1/2 tab bid    Essential hypertension    Osteoarthritis        -Patient is pursuing Conservative Program  -Initial weight loss goal of 5-10% weight loss for improved health  -Screening labs - 11/14/19  - she is more into "natural" remedies but is now interested in AOM  EKG: Sinus bradycardia, Possible Left atrial enlargement, Nonspecific ST and T wave abnormality,  No previous ECGs available  Discussed proper use and possible side effects of phentermine  Initial: 181 5lbs  Current: 182 8lbs (180lb reported)  Change:  +1 8  Goal: 140lbs     Goals:  Food log (ie ) www myfitnesspal com,sparkpeople  com,loseit com,calorieking  com,etc  baritastic  No sugary beverages  At least 64oz of water daily  Increase physical activity by 10 minutes daily  Gradually increase physical activity to a goal of 5 days per week for 30 minutes of MODERATE intensity PLUS 2 days per week of FULL BODY resistance training  5-10 servings of fruits and vegetables per day, 25-35 grams of dietary fiber per day and 3241-4460 calories per day    Follow up in approximately 2 months with Non-Surgical Physician/Advanced Practitioner  Subjective:   Chief Complaint   Patient presents with    Follow-up     mwm 2 month follow up       Patient ID: Amparo Chang  is a 44 y o  female with excess weight/obesity here to pursue weight management  Patient is pursuing Conservative Program      HPI     2 mo follow up  EKG done, stable    She gained weight      She is doing zoom video group exercises x 4 times a week but did it only for 2 weeks     Nutrition: she stopped the meal plan     She is now interested in trying pharmacotherapy   She admits she tried Garcinia Pills and gave her abdominal pain  The following portions of the patient's history were reviewed and updated as appropriate: allergies, current medications, past family history, past medical history, past social history, past surgical history and problem list     Review of Systems   Constitutional: Negative for activity change and appetite change  Respiratory: Negative  Cardiovascular: Negative  Gastrointestinal: Negative  Genitourinary: Negative  Musculoskeletal: Negative for arthralgias  Skin: Negative for rash  Psychiatric/Behavioral: Negative  Objective:    /80 (BP Location: Right arm, Patient Position: Sitting, Cuff Size: Large)   Pulse 69   Temp (!) 96 6 °F (35 9 °C)   Ht 5' 1 5" (1 562 m)   Wt 82 9 kg (182 lb 12 8 oz)   LMP 07/02/2016   BMI 33 98 kg/m²      Physical Exam    Constitutional   General appearance: Abnormal   well developed and obese  Eyes No conjunctival pallor  Pulmonary   Respiratory effort: No increased work of breathing or signs of respiratory distress  Auscultation of lungs: Clear to auscultation, equal breath sounds bilaterally, no wheezes, no rales, no rhonci  Cardiovascular   Auscultation of heart: Normal rate and rhythm, normal S1 and S2, without murmurs  Examination of extremities for edema and/or varicosities: Normal   no edema  Abdomen   Abdomen: Abnormal   The abdomen was obese  Bowel sounds were normal  The abdomen was soft and nontender     Musculoskeletal   Gait and station: Normal     Psychiatric   Orientation to person, place and time: Normal     Affect: appropriate

## 2020-07-16 ENCOUNTER — OFFICE VISIT (OUTPATIENT)
Dept: SLEEP CENTER | Facility: CLINIC | Age: 50
End: 2020-07-16
Payer: COMMERCIAL

## 2020-07-16 VITALS
HEIGHT: 62 IN | WEIGHT: 183.6 LBS | OXYGEN SATURATION: 99 % | BODY MASS INDEX: 33.79 KG/M2 | SYSTOLIC BLOOD PRESSURE: 114 MMHG | DIASTOLIC BLOOD PRESSURE: 68 MMHG | HEART RATE: 56 BPM

## 2020-07-16 DIAGNOSIS — R06.83 SNORING: ICD-10-CM

## 2020-07-16 DIAGNOSIS — G47.33 OSA (OBSTRUCTIVE SLEEP APNEA): Primary | ICD-10-CM

## 2020-07-16 DIAGNOSIS — E66.9 OBESITY (BMI 35.0-39.9 WITHOUT COMORBIDITY): ICD-10-CM

## 2020-07-16 PROCEDURE — 99204 OFFICE O/P NEW MOD 45 MIN: CPT | Performed by: PSYCHIATRY & NEUROLOGY

## 2020-07-16 PROCEDURE — 3008F BODY MASS INDEX DOCD: CPT | Performed by: FAMILY MEDICINE

## 2020-07-16 PROCEDURE — 3008F BODY MASS INDEX DOCD: CPT | Performed by: PSYCHIATRY & NEUROLOGY

## 2020-07-16 NOTE — PROGRESS NOTES
Review of Systems      Genitourinary need to urinate more than twice a night, hot flashes at night and post menopausal (no peroids)   Cardiology none   Gastrointestinal none   Neurology none   Constitutional fatigue   Integumentary rash or dry skin   Psychiatry none   Musculoskeletal muscle aches, back pain, sciatica and leg cramps   Pulmonary snoring   ENT ringing in ears   Endocrine none   Hematological none

## 2020-07-16 NOTE — PATIENT INSTRUCTIONS
1  Schedule diagnostic polysomnogram  2  Titrate nasal CPAP warranted  3  Return to the Sleep [address] [place] [address] for review of results  4  Determine method of treatment based on test results       Nursing Support:  When: Monday through Friday 7A-5PM except holidays  Where: Our direct line is [phone]  If you are having a true emergency please call 911  In the event that the line is busy or it is after hours please leave a voice message and we will return your call  Please speak clearly, leaving your full name, birth date, best number to reach you and the reason for your call  Medication refills: We will need the name of the medication, the dosage, the ordering provider, whether you get a 30 or 90 day refill, and the pharmacy name and address  Medications will be ordered by the provider only  Nurses cannot call in prescriptions  Please allow 7 days for medication refills  Physician requested updates: If your provider requested that you call with an update after starting medication, please be ready to provide us the medication and dosage, what time you take your medication, the time you attempt to fall asleep, time you fall asleep, when you wake up, and what time you get out of bed  Sleep Study Results: We will contact you with sleep study results and/or next steps after the physician has reviewed your testing

## 2020-07-16 NOTE — PROGRESS NOTES
Consultation - [facility], 1970, MRN: [id number]    7/16/2020        Reason for Consult / Principal Problem:    Loud snoring  Daytime sleepiness  Possible obstructive sleep apnea       Thank you for the opportunity of participating in the evaluation and care of this patient in the Sleep Clinic at [facility]  Subjective:     HPI: Anjali Coleman is a 44y o  year old female  She reports a history of very loud snoring  This disturbs her  when he is attempting to sleep  The patient has actually heard herself snoring during sleep which sometimes awakens her  There is no reported evidence of abnormal breathing or apnea  She describes daytime sleepiness and occasional napping  Employment:   [employer], 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    Sleep Schedule:       Bedtime:  23:00      Latency:  Variable      Wakeup time:  Currently 07:00 while not working    Awakenings:       Frequency:  1-2 per night      Causes:  Using the bathroom      Duration:  Brief    Daytime Sleepiness / Inappropriate Sleep:       Most severe:  When not active       Naps :  No deliberate napping      Inappropriate drowsiness / sleep: Whenever sedentary    Snoring:  Very loud    Apnea:  Unknown    Change in Weight:  Approximately 10 lb up in the past 6 months    Restless Leg Syndrome:  No clinical symptoms consistent with this diagnosis     Other Complaints:  Hot flashes through the night, fatigue, muscle aches, back pain, sciatica, leg cramps and tinnitus  Social History:      Caffeine:  1/2 cup of coffee each morning       E-cig/Vaping:    E-Cigarette/Vaping      E-Cigarette/Vaping Substances    Nicotine No     THC No     CBD No     Flavoring No     Other No     Unknown No          Tobacco:   reports that she has never smoked   She has never used smokeless tobacco        Alcohol:   reports that she does not drink alcohol  Drugs:   reports that she does not use drugs  The review of systems and following portions of the patient's history were reviewed and updated as appropriate: allergies, current medications, past family history, past medical history, past social history, past surgical history and problem list     Review of Systems      Genitourinary need to urinate more than twice a night, hot flashes at night and post menopausal (no peroids)   Cardiology none   Gastrointestinal none   Neurology none   Constitutional fatigue   Integumentary rash or dry skin   Psychiatry none   Musculoskeletal muscle aches, back pain, sciatica and leg cramps   Pulmonary snoring   ENT ringing in ears   Endocrine none   Hematological none       Objective:       Vitals:    07/16/20 1300   BP: 114/68   Pulse: 56   SpO2: 99%   Weight: 83 3 kg (183 lb 9 6 oz)   Height: 5' 2" (1 575 m)     Body mass index is 33 58 kg/m²  Neck Circumference: 13 5  [place] Sleepiness Scale:  Total score: 10      Current Outpatient Medications:     meloxicam (MOBIC) 7 5 mg tablet, Take 1 tablet (7 5 mg total) by mouth daily, Disp: 30 tablet, Rfl: 5    phentermine (ADIPEX-P) 37 5 MG tablet, Take 1/2 tab daily before breakfast for 2 weeks then if tolerated take 1/2 tab bid, Disp: 30 tablet, Rfl: 2    Probiotic Product (PROBIOTIC DAILY PO), Take by mouth daily, Disp: , Rfl:     RA VITAMIN D-3 125 MCG (5000 UT) capsule, Take 1 capsule (5,000 Units total) by mouth daily, Disp: 90 capsule, Rfl: 1    citalopram (CeleXA) 20 mg tablet, Take by mouth, Disp: , Rfl:     Physical Exam  General Appearance:   Alert, cooperative, no distress, appears stated age, obese     Head:   Normocephalic, without obvious abnormality, atraumatic     Eyes:   PERRL, conjunctiva/corneas clear, EOM's intact          Nose:  Nares normal, septum midline, mucosa normal, no drainage or sinus tenderness     Neck:  Supple, symmetrical, trachea midline, no adenopathy; Thyroid: No enlargement, tenderness or nodules; no carotid bruit or JVD       Extremities:  Extremities normal, atraumatic, no cyanosis or edema     Pulses:  2+ and symmetric all extremities     Skin:  Skin color, texture, turgor normal, no rashes or lesions       Neurologic:  CNII-XII intact  Normal strength, sensation throughout     Sleep Study Results:  Not available at this time      ASSESSMENT / PLAN     1  JOSUÉ (obstructive sleep apnea)  Home Study   2  Snoring  Ambulatory referral to Sleep Medicine   3  Obesity (BMI 35 0-39 9 without comorbidity)           Counseling / Coordination of Care  Total clinic time spent today 60 minutes  Greater than 50% of total time was spent with the patient and / or family counseling and / or coordination of care  A description of the counseling / coordination of care:     instructions for management, prognosis, patient and family education, impressions and risks and benefits of treatment options    The following instructions have been given to the patient today:    Patient Instructions   1  Schedule diagnostic polysomnogram  2  Titrate nasal CPAP warranted  3  Return to the Sleep [address] [place] [address] for review of results  4  Determine method of treatment based on test results       Nursing Support:  When: Monday through Friday 7A-5PM except holidays  Where: Our direct line is [phone]  If you are having a true emergency please call 911  In the event that the line is busy or it is after hours please leave a voice message and we will return your call  Please speak clearly, leaving your full name, birth date, best number to reach you and the reason for your call  Medication refills: We will need the name of the medication, the dosage, the ordering provider, whether you get a 30 or 90 day refill, and the pharmacy name and address  Medications will be ordered by the provider only  Nurses cannot call in prescriptions    Please allow 7 days for medication refills  Physician requested updates: If your provider requested that you call with an update after starting medication, please be ready to provide us the medication and dosage, what time you take your medication, the time you attempt to fall asleep, time you fall asleep, when you wake up, and what time you get out of bed  Sleep Study Results: We will contact you with sleep study results and/or next steps after the physician has reviewed your testing              Guido Mixon

## 2020-07-17 ENCOUNTER — TELEPHONE (OUTPATIENT)
Dept: SLEEP CENTER | Facility: CLINIC | Age: 50
End: 2020-07-17

## 2020-07-17 ENCOUNTER — OFFICE VISIT (OUTPATIENT)
Dept: FAMILY MEDICINE CLINIC | Facility: CLINIC | Age: 50
End: 2020-07-17

## 2020-07-17 VITALS
RESPIRATION RATE: 15 BRPM | BODY MASS INDEX: 33.11 KG/M2 | DIASTOLIC BLOOD PRESSURE: 80 MMHG | WEIGHT: 181 LBS | SYSTOLIC BLOOD PRESSURE: 126 MMHG | HEART RATE: 73 BPM | OXYGEN SATURATION: 98 % | TEMPERATURE: 98.3 F

## 2020-07-17 DIAGNOSIS — I83.813 VARICOSE VEINS OF BOTH LOWER EXTREMITIES WITH PAIN: ICD-10-CM

## 2020-07-17 DIAGNOSIS — M79.672 PAIN IN BOTH FEET: ICD-10-CM

## 2020-07-17 DIAGNOSIS — M79.671 PAIN IN BOTH FEET: ICD-10-CM

## 2020-07-17 DIAGNOSIS — M62.838 MUSCLE SPASM: Primary | ICD-10-CM

## 2020-07-17 PROCEDURE — 3074F SYST BP LT 130 MM HG: CPT | Performed by: FAMILY MEDICINE

## 2020-07-17 PROCEDURE — 1036F TOBACCO NON-USER: CPT | Performed by: FAMILY MEDICINE

## 2020-07-17 PROCEDURE — 3079F DIAST BP 80-89 MM HG: CPT | Performed by: FAMILY MEDICINE

## 2020-07-17 PROCEDURE — 99214 OFFICE O/P EST MOD 30 MIN: CPT | Performed by: FAMILY MEDICINE

## 2020-07-17 RX ORDER — CYCLOBENZAPRINE HCL 10 MG
10 TABLET ORAL 3 TIMES DAILY
Qty: 30 TABLET | Refills: 0 | Status: SHIPPED | OUTPATIENT
Start: 2020-07-17 | End: 2020-09-08

## 2020-07-17 NOTE — TELEPHONE ENCOUNTER
Pt's insurance will not cover HST  Can you place order for in lab study        Thanks,    [facility]

## 2020-07-20 ENCOUNTER — TRANSCRIBE ORDERS (OUTPATIENT)
Dept: SLEEP CENTER | Facility: CLINIC | Age: 50
End: 2020-07-20

## 2020-07-21 ENCOUNTER — EVALUATION (OUTPATIENT)
Dept: PHYSICAL THERAPY | Facility: CLINIC | Age: 50
End: 2020-07-21
Payer: COMMERCIAL

## 2020-07-21 DIAGNOSIS — M62.838 MUSCLE SPASM: Primary | ICD-10-CM

## 2020-07-21 DIAGNOSIS — M54.50 LUMBAR SPINE PAIN: ICD-10-CM

## 2020-07-21 DIAGNOSIS — G47.33 OSA (OBSTRUCTIVE SLEEP APNEA): Primary | ICD-10-CM

## 2020-07-21 PROCEDURE — 97110 THERAPEUTIC EXERCISES: CPT | Performed by: PHYSICAL THERAPIST

## 2020-07-21 PROCEDURE — 97161 PT EVAL LOW COMPLEX 20 MIN: CPT | Performed by: PHYSICAL THERAPIST

## 2020-07-21 PROCEDURE — 97140 MANUAL THERAPY 1/> REGIONS: CPT | Performed by: PHYSICAL THERAPIST

## 2020-07-21 NOTE — PROGRESS NOTES
PT Evaluation     Today's date: 2020  Patient name: Damon Das  : 1970  MRN: [id number]  Referring provider: Sea De La O MD  Dx:   Encounter Diagnosis     ICD-10-CM    1  Muscle spasm M62 838 Ambulatory referral to Physical Therapy   2  Lumbar spine pain M54 5                   Assessment  Assessment details: Pt is a pleasant 44 y o  female presenting to outpatient physical therapy with Muscle spasm  (primary encounter diagnosis)  Lumbar spine pain  Pt presents with pain, decreased range of motion, decreased strength, and decreased tolerance to activity  Displays movement impairment diagnosis of left thoracolumbar hypomobility dysfunction  Pt is a good candidate for outpatient physical therapy and would benefit from skilled physical therapy to address limitations and to achieve goals  Thank you for this referral    Impairments: abnormal coordination, abnormal or restricted ROM, activity intolerance, impaired physical strength and pain with function  Understanding of Dx/Px/POC: good   Prognosis: good    Goals  ST  Patient will report 25% decrease in pain in 4 weeks  2  Patient will demonstrate 25% improvement in ROM in 4 weeks  3  Patient will demonstrate 1/2 grade improvement in strength in 4 weeks  LT  Patient will be able to perform IADLS without restriction or pain by discharge  2  Patient will be independent in HEP by discharge  3  Patient will be able to return to recreational/work duties without restriction or pain by discharge        Plan  Patient would benefit from: PT eval and skilled PT  Planned modality interventions: cryotherapy and thermotherapy: hydrocollator packs  Planned therapy interventions: IADL retraining, body mechanics training, flexibility, functional ROM exercises, home exercise program, neuromuscular re-education, manual therapy, postural training, strengthening, stretching, therapeutic activities, therapeutic exercise and joint mobilization  Frequency: 2x week  Duration in visits: 6  Duration in weeks: 3  Treatment plan discussed with: patient        Subjective Evaluation    History of Present Illness  Mechanism of injury: : Pt reports 1 week history of mid back and shoulder pain of unknown etiology and insidious onset  Reports she consulted PCP, who referred her to PT  Sx AGGS: lifting with either arm, reaching    Sx LOC: L side of torso, described as aching  Denies tingling or radiating symptoms    Sx EASES: medications, patches  Pain  Current pain ratin  At best pain ratin  At worst pain ratin    Patient Goals  Patient goals for therapy: decreased pain          Objective     Palpation   Left   Tenderness of the erector spinae, lumbar paraspinals and quadratus lumborum  Right   No palpable tenderness to the erector spinae and lumbar paraspinals  Active Range of Motion     Lumbar   Flexion:  with pain Restriction level: moderate  Extension:  with pain Restriction level: maximal  Left lateral flexion:  with pain Restriction level: moderate  Right lateral flexion:  with pain Restriction level: moderate  Left rotation:  with pain Restriction level: moderate  Right rotation:  with pain Restriction level: moderate    Joint Play   Joints within functional limits: L1, L2, L3, L4 and L5     Hypomobile: T4, T5, T6, T7, T8, T9, T10, T11 and T12     Pain: T7, T8, T11 and T12     Tests     Lumbar     Left   Negative passive SLR  Right   Negative passive SLR                Precautions: HTN    Date             FOTO IE            Re-eval IE                         Manuals    LS sidelying rot mobs - L SALVATORE Gr IV            LS lat flex/rot mobs - L SALVATORE Gr IV            TS PA mobs SALVATORE Gr IV T10-12            STM L QL SALVATORE                         Neuro Re-Ed    TA iso in [employer] dogs                          Ther Ex    LTR             UTR - L 10"x5            hooklying DB butterfly str 5 brths            Child's pose             Seated QL str - L 10"x5                                      Ther Activity                              Gait Training                              Modalities

## 2020-07-24 ENCOUNTER — OFFICE VISIT (OUTPATIENT)
Dept: PHYSICAL THERAPY | Facility: CLINIC | Age: 50
End: 2020-07-24
Payer: COMMERCIAL

## 2020-07-24 DIAGNOSIS — M62.838 MUSCLE SPASM: Primary | ICD-10-CM

## 2020-07-24 DIAGNOSIS — M54.50 LUMBAR SPINE PAIN: ICD-10-CM

## 2020-07-24 PROCEDURE — 97140 MANUAL THERAPY 1/> REGIONS: CPT

## 2020-07-24 PROCEDURE — 97112 NEUROMUSCULAR REEDUCATION: CPT

## 2020-07-24 PROCEDURE — 97110 THERAPEUTIC EXERCISES: CPT

## 2020-07-24 NOTE — PROGRESS NOTES
Daily Note     Today's date: 2020  Patient name: Mahsa Araujo  : 1970  MRN: [id number]  Referring provider: Eliezer Ordoñez MD  Dx:   Encounter Diagnosis     ICD-10-CM    1  Muscle spasm M62 838    2  Lumbar spine pain M54 5                   Subjective: Pt reports feeling better since IE and compliance with HEP  She noted continued lower back pain, but overall less pain at present time  Objective: See treatment diary below      Assessment: Tolerated treatment well  Positive response with manuals  Intermittent cues to melania through with correct dosage and form  Patient demonstrated fatigue post treatment, exhibited good technique with therapeutic exercises and would benefit from continued PT  Pt advised to continue with current HEP  Plan: Continue per plan of care  Progress treatment as tolerated         Precautions: HTN    Date            FOTO IE            Re-eval IE                         Manuals    LS sidelying rot mobs - L SALVATORE Gr IV SALVATORE Gr IV b/l           LS lat flex/rot mobs - L SALVATORE Gr IV SALVATORE Gr IV           TS PA mobs SALVATORE Gr IV T10-12 np           STM L QL SALVATORE TE                        Neuro Re-Ed    TA iso in hookly  5"x10           deadbugs   5"x10ea           Bird dogs                          Ther Ex    LTR  15"x5           UTR - L 10"x5 15"x5           hooklying DB butterfly str 5 brths 5 brths           Child's pose  15"x5           Seated QL str - L 10"x5 15"x5                                     Ther Activity                              Gait Training                              Modalities
No

## 2020-07-28 ENCOUNTER — OFFICE VISIT (OUTPATIENT)
Dept: PHYSICAL THERAPY | Facility: CLINIC | Age: 50
End: 2020-07-28
Payer: COMMERCIAL

## 2020-07-28 DIAGNOSIS — M54.50 LUMBAR SPINE PAIN: ICD-10-CM

## 2020-07-28 DIAGNOSIS — M62.838 MUSCLE SPASM: Primary | ICD-10-CM

## 2020-07-28 PROCEDURE — 97112 NEUROMUSCULAR REEDUCATION: CPT

## 2020-07-28 PROCEDURE — 97140 MANUAL THERAPY 1/> REGIONS: CPT

## 2020-07-28 PROCEDURE — 97110 THERAPEUTIC EXERCISES: CPT

## 2020-07-28 NOTE — PROGRESS NOTES
Daily Note     Today's date: 2020  Patient name: Odette Burns  : 1970  MRN: [id number]  Referring provider: Bib Givens MD  Dx:   Encounter Diagnosis     ICD-10-CM    1  Muscle spasm M62 838    2  Lumbar spine pain M54 5                   Subjective: Pt report feeling better since last visit  She continues with pain in left lumbar region, but decreased since beginning therapy  She noted continued compliance with HEP  Objective: See treatment diary below      Assessment: Tolerated treatment well  Improved tolerance with exercises  Patient demonstrated fatigue post treatment, exhibited good technique with therapeutic exercises and would benefit from continued PT      Plan: Continue per plan of care  Progress treatment as tolerated         Precautions: HTN    Date           FOTO IE            Re-eval IE                         Manuals    LS sidelying rot mobs - L SALVATORE Gr IV SALVATORE Gr IV b/l SALVATORE Gr IV b/l          LS lat flex/rot mobs - L SALVATORE Gr IV SALVATORE Gr IV           TS PA mobs SALVATORE Gr IV T10-12 np           STM L QL SALVATORE TE TE                       Neuro Re-Ed    TA iso in hookly  5"x10 5"x10          deadbugs   5"x10ea 5"x10  ea          Bird dogs                          Ther Ex    LTR  15"x5 15"x5          UTR - L 10"x5 15"x5 15"x5          hooklying DB butterfly str 5 brths 5 brths 10 brths          Child's pose  15"x5 15"x5          Seated QL str - L 10"x5 15"x5 15"x5          Bike   5' 5'                       Ther Activity                              Gait Training                              Modalities

## 2020-07-31 ENCOUNTER — OFFICE VISIT (OUTPATIENT)
Dept: PHYSICAL THERAPY | Facility: CLINIC | Age: 50
End: 2020-07-31
Payer: COMMERCIAL

## 2020-07-31 DIAGNOSIS — M62.838 MUSCLE SPASM: Primary | ICD-10-CM

## 2020-07-31 DIAGNOSIS — M54.50 LUMBAR SPINE PAIN: ICD-10-CM

## 2020-07-31 PROCEDURE — 97112 NEUROMUSCULAR REEDUCATION: CPT

## 2020-07-31 PROCEDURE — 97110 THERAPEUTIC EXERCISES: CPT

## 2020-07-31 PROCEDURE — 97140 MANUAL THERAPY 1/> REGIONS: CPT

## 2020-07-31 NOTE — PROGRESS NOTES
Daily Note     Today's date: 2020  Patient name: Gary Gomez  : 1970  MRN: [id number]  Referring provider: Jerrell Zee MD  Dx:   Encounter Diagnosis     ICD-10-CM    1  Muscle spasm M62 838    2  Lumbar spine pain M54 5                   Subjective: Pt reports with c/o pain in left lower thoracic/upper lumbar region prior to beginning today  However, she reported feeling better since beginning therapy and continued compliance with HEP  Objective: See treatment diary below      Assessment: Tolerated exercises and treatment well  Added bird dogs today with good response  Relief reported with manual treatment  Patient demonstrated fatigue post treatment, exhibited good technique with therapeutic exercises and would benefit from continued PT      Plan: Continue per plan of care  Progress treatment as tolerated         Precautions: HTN    Date          FOTO IE            Re-eval IE                         Manuals    LS sidelying rot mobs - L SALVATORE Gr IV SALVATORE Gr IV b/l SALVATORE Gr IV b/l SALVATORE Gr IV b/l         LS lat flex/rot mobs - L SALVATORE Gr IV SALVATORE Gr IV           TS PA mobs SALVATORE Gr IV T10-12 np           STM L QL SALVATORE TE TE TE                      Neuro Re-Ed    TA iso in hookly  5"x10 5"x10 5"x15         deadbugs   5"x10ea 5"x10  ea 5"x10 ea         Bird dogs    5"x10 ea                      Ther Ex    LTR  15"x5 15"x5 15"x5         UTR - L 10"x5 15"x5 15"x5 15"x5         hooklying DB butterfly str 5 brths 5 brths 10 brths          Child's pose  15"x5 15"x5 15"x5         Seated QL str - L 10"x5 15"x5 15"x5 15"x5         Bike   5' 5' 5'                      Ther Activity                              Gait Training                              Modalities

## 2020-08-04 ENCOUNTER — APPOINTMENT (OUTPATIENT)
Dept: PHYSICAL THERAPY | Facility: CLINIC | Age: 50
End: 2020-08-04
Payer: COMMERCIAL

## 2020-08-05 ENCOUNTER — OFFICE VISIT (OUTPATIENT)
Dept: PHYSICAL THERAPY | Facility: CLINIC | Age: 50
End: 2020-08-05
Payer: COMMERCIAL

## 2020-08-05 DIAGNOSIS — M62.838 MUSCLE SPASM: Primary | ICD-10-CM

## 2020-08-05 DIAGNOSIS — M54.50 LUMBAR SPINE PAIN: ICD-10-CM

## 2020-08-05 PROCEDURE — 97110 THERAPEUTIC EXERCISES: CPT

## 2020-08-05 PROCEDURE — 97140 MANUAL THERAPY 1/> REGIONS: CPT

## 2020-08-05 PROCEDURE — 97112 NEUROMUSCULAR REEDUCATION: CPT

## 2020-08-05 NOTE — PROGRESS NOTES
Daily Note     Today's date: 2020  Patient name: Neela Galvez  : 1970  MRN: [id number]  Referring provider: Danyelle Ravi MD  Dx:   Encounter Diagnosis     ICD-10-CM    1  Muscle spasm  M62 838    2  Lumbar spine pain  M54 5                   Subjective: Pt reports continued lower back pain on left side, but better since beginning therapy  Objective: See treatment diary below      Assessment: Tolerated treatment well  Intermittent cues to for correct dosage and setup  Patient demonstrated fatigue post treatment, exhibited good technique with therapeutic exercises and would benefit from continued PT      Plan: Continue per plan of care  Progress treatment as tolerated         Precautions: HTN    Date         FOTO IE            Re-eval IE                         Manuals    LS sidelying rot mobs - L SALVATORE Gr IV SALVATORE Gr IV b/l SALVATORE Gr IV b/l SALVATORE Gr IV b/l SALVATORE Gr IV b/l        LS lat flex/rot mobs - L SALVATORE Gr IV SALVATORE Gr IV           TS PA mobs SALVATORE Gr IV T10-12 np           STM L QL SALVATORE TE TE TE TE                     Neuro Re-Ed    TA iso in hookly  5"x10 5"x10 5"x15 5"x15        deadbugs   5"x10ea 5"x10  ea 5"x10 ea 5"x10        Bird dogs    5"x10 ea 5"x10 ea                     Ther Ex    LTR  15"x5 15"x5 15"x5 15"x5        UTR - L 10"x5 15"x5 15"x5 15"x5 15"x5        hooklying DB butterfly str 5 brths 5 brths 10 brths          Child's pose  15"x5 15"x5 15"x5 15"x5        Seated QL str - L 10"x5 15"x5 15"x5 15"x5 15"x5        Bike   5' 5' 5' 5'                     Ther Activity                              Gait Training                              Modalities

## 2020-08-07 ENCOUNTER — OFFICE VISIT (OUTPATIENT)
Dept: PHYSICAL THERAPY | Facility: CLINIC | Age: 50
End: 2020-08-07
Payer: COMMERCIAL

## 2020-08-07 DIAGNOSIS — M54.50 LUMBAR SPINE PAIN: ICD-10-CM

## 2020-08-07 DIAGNOSIS — M62.838 MUSCLE SPASM: Primary | ICD-10-CM

## 2020-08-07 PROCEDURE — 97140 MANUAL THERAPY 1/> REGIONS: CPT

## 2020-08-07 PROCEDURE — 97112 NEUROMUSCULAR REEDUCATION: CPT

## 2020-08-07 PROCEDURE — 97110 THERAPEUTIC EXERCISES: CPT

## 2020-08-07 NOTE — PROGRESS NOTES
Daily Note     Today's date: 2020  Patient name: Anjali Coleman  : 1970  MRN: [id number]  Referring provider: Lionel Oconnor MD  Dx:   Encounter Diagnosis     ICD-10-CM    1  Muscle spasm  M62 838    2  Lumbar spine pain  M54 5                   Subjective: Pt reports feeling better today; less pain since last visit  She reported discomfort in L mid thoracic/lateral trunk region  Objective: See treatment diary below      Assessment: Tolerated treatment well  Patient demonstrated fatigue post treatment, exhibited good technique with therapeutic exercises and would benefit from continued PT      Plan: Continue per plan of care  Progress treatment as tolerated         Precautions: HTN    Date        FOTO IE            Re-eval IE                         Manuals    LS sidelying rot mobs - L SALVATORE Gr IV SALVATORE Gr IV b/l SALVATORE Gr IV b/l SALVATORE Gr IV b/l SALVATORE Gr IV b/l TE lumbar rolls       LS lat flex/rot mobs - L SALVATORE Gr IV SALVATORE Gr IV           TS PA mobs SALVATORE Gr IV T10-12 np           STM L QL SALVATORE TE TE TE TE TE (&  T/S,  L/S psm's)                    Neuro Re-Ed    TA iso in hookly  5"x10 5"x10 5"x15 5"x15 5"x15       deadbugs   5"x10ea 5"x10  ea 5"x10 ea 5"x10 5"x20       Bird dogs    5"x10 ea 5"x10 ea 5"x10 ea                    Ther Ex    LTR  15"x5 15"x5 15"x5 15"x5 15"x5       UTR - L 10"x5 15"x5 15"x5 15"x5 15"x5 15"x5 b/l       hooklying DB butterfly str 5 brths 5 brths 10 brths          Child's pose  15"x5 15"x5 15"x5 15"x5 15"x5       Seated QL str - L 10"x5 15"x5 15"x5 15"x5 15"x5 15"x5       Bike   5' 5' 5' 5' 5'                    Ther Activity                              Gait Training                              Modalities

## 2020-08-11 ENCOUNTER — OFFICE VISIT (OUTPATIENT)
Dept: PHYSICAL THERAPY | Facility: CLINIC | Age: 50
End: 2020-08-11
Payer: COMMERCIAL

## 2020-08-11 DIAGNOSIS — M62.838 MUSCLE SPASM: Primary | ICD-10-CM

## 2020-08-11 DIAGNOSIS — M54.50 LUMBAR SPINE PAIN: ICD-10-CM

## 2020-08-11 PROCEDURE — 97110 THERAPEUTIC EXERCISES: CPT | Performed by: PHYSICAL THERAPIST

## 2020-08-11 PROCEDURE — 97140 MANUAL THERAPY 1/> REGIONS: CPT | Performed by: PHYSICAL THERAPIST

## 2020-08-11 PROCEDURE — 97112 NEUROMUSCULAR REEDUCATION: CPT | Performed by: PHYSICAL THERAPIST

## 2020-08-11 NOTE — PROGRESS NOTES
Daily Note     Today's date: 2020  Patient name: Anjali Coleman  : 1970  MRN: [id number]  Referring provider: Lionel Oconnor MD  Dx:   Encounter Diagnosis     ICD-10-CM    1  Muscle spasm  M62 838    2  Lumbar spine pain  M54 5                   Subjective: Pt comes to therapy reporting discomfort on left side with IADLS  States she feels improvements since starting therapy though  Objective: See treatment diary below      Assessment: Tolerated treatment well  Patient exhibited good technique with therapeutic exercises and would benefit from continued PT      Plan: Potential discharge next visit       Precautions: HTN    Date       FOTO IE            Re-eval IE                         Manuals    LS sidelying rot mobs - L SALVATORE Gr IV SALVATORE Gr IV b/l SALVATORE Gr IV b/l SALVATORE Gr IV b/l SALVATORE Gr IV b/l TE lumbar rolls SALVATORE Gr IV b/l      LS lat flex/rot mobs - L SALVATORE Gr IV SALVATORE Gr IV           TS PA mobs SALVATORE Gr IV T10-12 np           STM L QL SALVATORE TE TE TE TE TE (&  T/S,  L/S psm's) np                   Neuro Re-Ed    TA iso in hookly  5"x10 5"x10 5"x15 5"x15 5"x15 D/C      deadbugs   5"x10ea 5"x10  ea 5"x10 ea 5"x10 5"x20 5"x20 ea      Bird dogs    5"x10 ea 5"x10 ea 5"x10 ea 2x10 ea                   Ther Ex    LTR  15"x5 15"x5 15"x5 15"x5 15"x5 15"x5      UTR - L 10"x5 15"x5 15"x5 15"x5 15"x5 15"x5 b/l 15"x5 ea      hooklying DB butterfly str 5 brths 5 brths 10 brths          Child's pose  15"x5 15"x5 15"x5 15"x5 15"x5 20"x5      Seated QL str - L 10"x5 15"x5 15"x5 15"x5 15"x5 15"x5 20"x5      Bike   5' 5' 5' 5' 5' 5'                   Ther Activity                              Gait Training                              Modalities

## 2020-08-14 ENCOUNTER — OFFICE VISIT (OUTPATIENT)
Dept: PHYSICAL THERAPY | Facility: CLINIC | Age: 50
End: 2020-08-14
Payer: COMMERCIAL

## 2020-08-14 DIAGNOSIS — M62.838 MUSCLE SPASM: Primary | ICD-10-CM

## 2020-08-14 DIAGNOSIS — M54.50 LUMBAR SPINE PAIN: ICD-10-CM

## 2020-08-14 PROCEDURE — 97140 MANUAL THERAPY 1/> REGIONS: CPT

## 2020-08-14 PROCEDURE — 97112 NEUROMUSCULAR REEDUCATION: CPT

## 2020-08-14 PROCEDURE — 97110 THERAPEUTIC EXERCISES: CPT

## 2020-08-14 NOTE — PROGRESS NOTES
Daily Note     Today's date: 2020  Patient name: Damon Das  : 1970  MRN: [id number]  Referring provider: Sea De La O MD  Dx:   Encounter Diagnosis     ICD-10-CM    1  Muscle spasm  M62 838    2  Lumbar spine pain  M54 5                   Subjective: Pt reports a little bit of pain in her back prior to beginning today  Objective: See treatment diary below      Assessment: Tolerated treatment well  Patient demonstrated fatigue post treatment, exhibited good technique with therapeutic exercises and would benefit from continued PT      Plan: Continue per plan of care  Progress treatment as tolerated         Precautions: HTN    Date      FOTO IE            Re-eval IE                         Manuals    LS sidelying rot mobs - L SALVATORE Gr IV SALVATORE Gr IV b/l SALVATORE Gr IV b/l SALVATORE Gr IV b/l SALVATORE Gr IV b/l TE lumbar rolls SALVATORE Gr IV b/l TE lumbar rolls     LS lat flex/rot mobs - L SALVATORE Gr IV SALVATORE Gr IV           TS PA mobs SALVATORE Gr IV T10-12 np           STM L QL SALVATORE TE TE TE TE TE (&  T/S,  L/S psm's) np                   Neuro Re-Ed    TA iso in hookly  5"x10 5"x10 5"x15 5"x15 5"x15 D/C      deadbugs   5"x10ea 5"x10  ea 5"x10 ea 5"x10 5"x20 5"x20 ea 5"x20     Bird dogs    5"x10 ea 5"x10 ea 5"x10 ea 2x10 ea 2x10 ea                  Ther Ex    LTR  15"x5 15"x5 15"x5 15"x5 15"x5 15"x5 15"x5     UTR - L 10"x5 15"x5 15"x5 15"x5 15"x5 15"x5 b/l 15"x5 ea 15"x5 ea b/l     hooklying DB butterfly str 5 brths 5 brths 10 brths          Child's pose  15"x5 15"x5 15"x5 15"x5 15"x5 20"x5 20"x5     Seated QL str - L 10"x5 15"x5 15"x5 15"x5 15"x5 15"x5 20"x5 20"x5     Bike   5' 5' 5' 5' 5' 5' 5'                  Ther Activity                              Gait Training                              Modalities

## 2020-08-21 ENCOUNTER — OFFICE VISIT (OUTPATIENT)
Dept: PHYSICAL THERAPY | Facility: CLINIC | Age: 50
End: 2020-08-21
Payer: COMMERCIAL

## 2020-08-21 DIAGNOSIS — M54.50 LUMBAR SPINE PAIN: ICD-10-CM

## 2020-08-21 DIAGNOSIS — M62.838 MUSCLE SPASM: Primary | ICD-10-CM

## 2020-08-21 PROCEDURE — 97112 NEUROMUSCULAR REEDUCATION: CPT

## 2020-08-21 NOTE — PROGRESS NOTES
Daily Note     Today's date: 2020  Patient name: Riaz Cho  : 1970  MRN: [id number]  Referring provider: Alem Amanda MD  Dx:   Encounter Diagnosis     ICD-10-CM    1  Muscle spasm  M62 838    2  Lumbar spine pain  M54 5                   Subjective: Pt reports feeling pretty good; denied LBP  She wishes to discontinue therapy today  Objective: See treatment diary below      Assessment: Tolerated treatment well  Patient denied pain with performance of exercises and exhibited good recall doing so  Plan: Pt currently discharged to be independent per patient request  Pt instructed to continue with current HEP once daily       Precautions: HTN    Date     FOTO IE        44    Re-eval IE                         Manuals    LS sidelying rot mobs - L SALVATORE Gr IV SALVATORE Gr IV b/l SALVATORE Gr IV b/l SALVATORE Gr IV b/l SALVATORE Gr IV b/l TE lumbar rolls SALVATORE Gr IV b/l TE lumbar rolls np    LS lat flex/rot mobs - L SALVATORE Gr IV SALVATORE Gr IV           TS PA mobs SALVATORE Gr IV T10-12 np           STM L QL SALVATORE TE TE TE TE TE (&  T/S,  L/S psm's) np                   Neuro Re-Ed    TA iso in hookly  5"x10 5"x10 5"x15 5"x15 5"x15 D/C      deadbugs   5"x10ea 5"x10  ea 5"x10 ea 5"x10 5"x20 5"x20 ea 5"x20 5"x20    Bird dogs    5"x10 ea 5"x10 ea 5"x10 ea 2x10 ea 2x10 ea 2x10 ea                 Ther Ex    LTR  15"x5 15"x5 15"x5 15"x5 15"x5 15"x5 15"x5 15"x5    UTR - L 10"x5 15"x5 15"x5 15"x5 15"x5 15"x5 b/l 15"x5 ea 15"x5 ea b/l 15"x5 ea b/l    hooklying DB butterfly str 5 brths 5 brths 10 brths          Child's pose  15"x5 15"x5 15"x5 15"x5 15"x5 20"x5 20"x5 20"x5    Seated QL str - L 10"x5 15"x5 15"x5 15"x5 15"x5 15"x5 20"x5 20"x5 20"x5    Bike   5' 5' 5' 5' 5' 5' 5' 5'                 Ther Activity                              Gait Training                              Modalities

## 2020-08-25 ENCOUNTER — APPOINTMENT (OUTPATIENT)
Dept: PHYSICAL THERAPY | Facility: CLINIC | Age: 50
End: 2020-08-25
Payer: COMMERCIAL

## 2020-08-28 ENCOUNTER — APPOINTMENT (OUTPATIENT)
Dept: PHYSICAL THERAPY | Facility: CLINIC | Age: 50
End: 2020-08-28
Payer: COMMERCIAL

## 2020-08-31 ENCOUNTER — TELEMEDICINE (OUTPATIENT)
Dept: FAMILY MEDICINE CLINIC | Facility: CLINIC | Age: 50
End: 2020-08-31

## 2020-08-31 DIAGNOSIS — I10 ESSENTIAL HYPERTENSION: Primary | ICD-10-CM

## 2020-08-31 DIAGNOSIS — B00.9 HERPES SIMPLEX INFECTION: ICD-10-CM

## 2020-08-31 DIAGNOSIS — E55.9 MILD VITAMIN D DEFICIENCY: ICD-10-CM

## 2020-08-31 DIAGNOSIS — E78.5 HYPERLIPIDEMIA, UNSPECIFIED HYPERLIPIDEMIA TYPE: ICD-10-CM

## 2020-08-31 PROCEDURE — G2012 BRIEF CHECK IN BY MD/QHP: HCPCS | Performed by: FAMILY MEDICINE

## 2020-08-31 RX ORDER — VALACYCLOVIR HYDROCHLORIDE 1 G/1
1000 TABLET, FILM COATED ORAL DAILY
Qty: 30 TABLET | Refills: 0 | Status: SHIPPED | OUTPATIENT
Start: 2020-08-31 | End: 2020-09-30

## 2020-08-31 NOTE — PROGRESS NOTES
Virtual Brief Visit    Assessment/Plan:    Problem List Items Addressed This Visit        Cardiovascular and Mediastinum    Essential hypertension - Primary    Relevant Orders    CBC and differential    Comprehensive metabolic panel    Lipid panel    Microalbumin / creatinine urine ratio    TSH, 3rd generation with Free T4 reflex    Vitamin D 25 hydroxy       Other    Herpes simplex infection    Relevant Medications    valACYclovir (VALTREX) 1,000 mg tablet    Hyperlipidemia    Relevant Orders    CBC and differential    Comprehensive metabolic panel    Lipid panel    Microalbumin / creatinine urine ratio    TSH, 3rd generation with Free T4 reflex    Vitamin D 25 hydroxy    Mild vitamin D deficiency    Relevant Orders    Vitamin D 25 hydroxy                Reason for visit is   Chief Complaint   Patient presents with    Back Pain     f/u back pain and pt requesting lab work  Encounter provider Ghazal Hutton MD    Provider located at [address] [place] [id number] [phone]    Recent Visits  No visits were found meeting these conditions  Showing recent visits within past 7 days and meeting all other requirements     Today's Visits  Date Type Provider Dept   08/31/20 Telemedicine Ghazal Hutton MD  [facility]   Showing today's visits and meeting all other requirements     Future Appointments  No visits were found meeting these conditions  Showing future appointments within next 150 days and meeting all other requirements        After connecting through telephone, the patient was identified by name and date of birth  Mahsa Araujo was informed that this is a telemedicine visit and that the visit is being conducted through telephone  My office door was closed  No one else was in the room  She acknowledged consent and understanding of privacy and security of the platform   The patient has agreed to participate and understands she can discontinue the visit at any time  Patient is aware this is a billable service  Subjective    Sheila Welsh is a 44 y o  female   45 yo female states back pain resolved with physical therapy, no further pain  Currently states she had an outbreak of herpes  Used to take Valttrex in the past, has not taken it for over 3 years, previous outbreak about 3 5 years ago  Back Pain   The problem has been resolved since onset  The pain is present in the thoracic spine  The quality of the pain is described as aching  The pain does not radiate  Past Medical History:   Diagnosis Date    Hyperlipidemia     Hypertension     JOSUÉ (obstructive sleep apnea) 2020    Sinus pressure        Past Surgical History:   Procedure Laterality Date     SECTION, LOW TRANSVERSE      last assessed 8/20/15     LAPAROSCOPIC TOTAL HYSTERECTOMY  2017    LARYNX SURGERY      last assessed 8/20/15     [place] COLONOSCOPY FLX DX W/COLLJ SPEC WHEN PFRMD N/A 3/26/2019    Procedure: COLONOSCOPY;  Surgeon: Ebony Sanchez MD;  Location: [facility]; Service: Gastroenterology    [place] ESOPHAGOGASTRODUODENOSCOPY TRANSORAL DIAGNOSTIC N/A 3/26/2019    Procedure: ESOPHAGOGASTRODUODENOSCOPY (EGD); Surgeon: Ebony Sanchez MD;  Location: [facility];   Service: Gastroenterology    TUBAL LIGATION      last assessed 8/20/15       Current Outpatient Medications   Medication Sig Dispense Refill    meloxicam (MOBIC) 7 5 mg tablet Take 1 tablet (7 5 mg total) by mouth daily 30 tablet 5    RA VITAMIN D-3 125 MCG (5000 UT) capsule Take 1 capsule (5,000 Units total) by mouth daily 90 capsule 1    citalopram (CeleXA) 20 mg tablet Take by mouth      cyclobenzaprine (FLEXERIL) 10 mg tablet Take 1 tablet (10 mg total) by mouth 3 (three) times a day (Patient not taking: Reported on 2020) 30 tablet 0    phentermine (ADIPEX-P) 37 5 MG tablet Take 1/2 tab daily before breakfast for 2 weeks then if tolerated take 1/2 tab bid (Patient not taking: Reported on 8/31/2020) 30 tablet 2    Probiotic Product (PROBIOTIC DAILY PO) Take by mouth daily      valACYclovir (VALTREX) 1,000 mg tablet Take 1 tablet (1,000 mg total) by mouth daily 30 tablet 0     No current facility-administered medications for this visit  No Known Allergies    Review of Systems   Genitourinary: Positive for vaginal pain (due to herpes outbreak)  Vitals:         I spent 16 minutes directly with the patient during this visit    VIRTUAL VISIT DISCLAIMER    Shelia Celeste acknowledges that she has consented to an online visit or consultation  She understands that the online visit is based solely on information provided by her, and that, in the absence of a face-to-face physical evaluation by the physician, the diagnosis she receives is both limited and provisional in terms of accuracy and completeness  This is not intended to replace a full medical face-to-face evaluation by the physician  Shelia Celeste understands and accepts these terms

## 2020-09-04 ENCOUNTER — APPOINTMENT (OUTPATIENT)
Dept: LAB | Facility: HOSPITAL | Age: 50
End: 2020-09-04
Payer: COMMERCIAL

## 2020-09-04 DIAGNOSIS — E55.9 MILD VITAMIN D DEFICIENCY: ICD-10-CM

## 2020-09-04 DIAGNOSIS — E78.5 HYPERLIPIDEMIA, UNSPECIFIED HYPERLIPIDEMIA TYPE: ICD-10-CM

## 2020-09-04 DIAGNOSIS — I10 ESSENTIAL HYPERTENSION: ICD-10-CM

## 2020-09-04 LAB
25(OH)D3 SERPL-MCNC: 49.3 NG/ML (ref 30–100)
ALBUMIN SERPL BCP-MCNC: 3.7 G/DL (ref 3.5–5)
ALP SERPL-CCNC: 47 U/L (ref 46–116)
ALT SERPL W P-5'-P-CCNC: 28 U/L (ref 12–78)
ANION GAP SERPL CALCULATED.3IONS-SCNC: 6 MMOL/L (ref 4–13)
AST SERPL W P-5'-P-CCNC: 18 U/L (ref 5–45)
BASOPHILS # BLD AUTO: 0.03 THOUSANDS/ΜL (ref 0–0.1)
BASOPHILS NFR BLD AUTO: 1 % (ref 0–1)
BILIRUB SERPL-MCNC: 0.75 MG/DL (ref 0.2–1)
BUN SERPL-MCNC: 13 MG/DL (ref 5–25)
CALCIUM SERPL-MCNC: 9.3 MG/DL (ref 8.3–10.1)
CHLORIDE SERPL-SCNC: 105 MMOL/L (ref 100–108)
CHOLEST SERPL-MCNC: 237 MG/DL (ref 50–200)
CO2 SERPL-SCNC: 31 MMOL/L (ref 21–32)
CREAT SERPL-MCNC: 0.99 MG/DL (ref 0.6–1.3)
CREAT UR-MCNC: 288 MG/DL
EOSINOPHIL # BLD AUTO: 0.13 THOUSAND/ΜL (ref 0–0.61)
EOSINOPHIL NFR BLD AUTO: 3 % (ref 0–6)
ERYTHROCYTE [DISTWIDTH] IN BLOOD BY AUTOMATED COUNT: 12.3 % (ref 11.6–15.1)
GFR SERPL CREATININE-BSD FRML MDRD: 66 ML/MIN/1.73SQ M
GLUCOSE P FAST SERPL-MCNC: 90 MG/DL (ref 65–99)
HCT VFR BLD AUTO: 42.9 % (ref 34.8–46.1)
HDLC SERPL-MCNC: 61 MG/DL
HGB BLD-MCNC: 13.4 G/DL (ref 11.5–15.4)
IMM GRANULOCYTES # BLD AUTO: 0.01 THOUSAND/UL (ref 0–0.2)
IMM GRANULOCYTES NFR BLD AUTO: 0 % (ref 0–2)
LDLC SERPL CALC-MCNC: 158 MG/DL (ref 0–100)
LYMPHOCYTES # BLD AUTO: 1.5 THOUSANDS/ΜL (ref 0.6–4.47)
LYMPHOCYTES NFR BLD AUTO: 35 % (ref 14–44)
MCH RBC QN AUTO: 29.7 PG (ref 26.8–34.3)
MCHC RBC AUTO-ENTMCNC: 31.2 G/DL (ref 31.4–37.4)
MCV RBC AUTO: 95 FL (ref 82–98)
MICROALBUMIN UR-MCNC: 12 MG/L (ref 0–20)
MICROALBUMIN/CREAT 24H UR: 4 MG/G CREATININE (ref 0–30)
MONOCYTES # BLD AUTO: 0.46 THOUSAND/ΜL (ref 0.17–1.22)
MONOCYTES NFR BLD AUTO: 11 % (ref 4–12)
NEUTROPHILS # BLD AUTO: 2.16 THOUSANDS/ΜL (ref 1.85–7.62)
NEUTS SEG NFR BLD AUTO: 50 % (ref 43–75)
NONHDLC SERPL-MCNC: 176 MG/DL
NRBC BLD AUTO-RTO: 0 /100 WBCS
PLATELET # BLD AUTO: 176 THOUSANDS/UL (ref 149–390)
PMV BLD AUTO: 13.2 FL (ref 8.9–12.7)
POTASSIUM SERPL-SCNC: 4 MMOL/L (ref 3.5–5.3)
PROT SERPL-MCNC: 7.5 G/DL (ref 6.4–8.2)
RBC # BLD AUTO: 4.51 MILLION/UL (ref 3.81–5.12)
SODIUM SERPL-SCNC: 142 MMOL/L (ref 136–145)
TRIGL SERPL-MCNC: 89 MG/DL
TSH SERPL DL<=0.05 MIU/L-ACNC: 1.93 UIU/ML (ref 0.36–3.74)
WBC # BLD AUTO: 4.29 THOUSAND/UL (ref 4.31–10.16)

## 2020-09-04 PROCEDURE — 82570 ASSAY OF URINE CREATININE: CPT

## 2020-09-04 PROCEDURE — 82306 VITAMIN D 25 HYDROXY: CPT

## 2020-09-04 PROCEDURE — 84443 ASSAY THYROID STIM HORMONE: CPT

## 2020-09-04 PROCEDURE — 36415 COLL VENOUS BLD VENIPUNCTURE: CPT

## 2020-09-04 PROCEDURE — 85025 COMPLETE CBC W/AUTO DIFF WBC: CPT

## 2020-09-04 PROCEDURE — 80053 COMPREHEN METABOLIC PANEL: CPT

## 2020-09-04 PROCEDURE — 80061 LIPID PANEL: CPT

## 2020-09-04 PROCEDURE — 82043 UR ALBUMIN QUANTITATIVE: CPT

## 2020-09-06 ENCOUNTER — HOSPITAL ENCOUNTER (OUTPATIENT)
Dept: SLEEP CENTER | Facility: CLINIC | Age: 50
Discharge: HOME | End: 2020-09-06
Payer: COMMERCIAL

## 2020-09-06 DIAGNOSIS — G47.33 OSA (OBSTRUCTIVE SLEEP APNEA): ICD-10-CM

## 2020-09-06 PROCEDURE — 95810 POLYSOM 6/> YRS 4/> PARAM: CPT

## 2020-09-06 PROCEDURE — 95810 POLYSOM 6/> YRS 4/> PARAM: CPT | Performed by: INTERNAL MEDICINE

## 2020-09-07 NOTE — PROGRESS NOTES
Sleep Study Documentation    Pre-Sleep Study       Sleep testing procedure explained to patient:YES    Patient napped prior to study:NO    Caffeine:Dayshift worker after 12PM   Caffeine use:YES Coffee    Alcohol:Dayshift workers after 5PM: Alcohol use:NO    Typical day for patient:YES       Study Documentation    Sleep Study Indications: The patient is here for nocturnal choking, excessive daytime sleepiness, snoring and witnessed apneas  Sleep Study: Diagnostic   Snore: Moderate  Supplemental O2: no        Minimum SaO2 92%  Baseline SaO2 96%            EKG abnormalities: no     EEG abnormalities: no    Sleep Study Recorded < 2 hours: N/A    Sleep Study Recorded > 2 hours but incomplete study: N/A    Sleep Study Recorded 6 hours but no sleep obtained: NO          Post-Sleep Study    Medication used at bedtime or during sleep study:NO    Patient reports time it took to fall asleep:20 to 30 minutes    Patient reports waking up during study:3 or more times  Patient reports returning to sleep without difficulty  Patient reports sleeping 4 to 6 hours without dreaming  Patient reports sleep during study:worse than usual    Patient rated sleepiness: Somewhat sleepy or tired    PAP treatment:no

## 2020-09-08 ENCOUNTER — TELEMEDICINE (OUTPATIENT)
Dept: BARIATRICS | Facility: CLINIC | Age: 50
End: 2020-09-08
Payer: COMMERCIAL

## 2020-09-08 ENCOUNTER — TELEPHONE (OUTPATIENT)
Dept: BARIATRICS | Facility: CLINIC | Age: 50
End: 2020-09-08

## 2020-09-08 VITALS — HEIGHT: 62 IN | BODY MASS INDEX: 31.83 KG/M2 | WEIGHT: 173 LBS

## 2020-09-08 DIAGNOSIS — F41.9 ANXIETY: ICD-10-CM

## 2020-09-08 DIAGNOSIS — E78.5 HYPERLIPIDEMIA, UNSPECIFIED HYPERLIPIDEMIA TYPE: Primary | ICD-10-CM

## 2020-09-08 DIAGNOSIS — I10 ESSENTIAL HYPERTENSION: ICD-10-CM

## 2020-09-08 DIAGNOSIS — E66.9 OBESITY (BMI 35.0-39.9 WITHOUT COMORBIDITY): Primary | ICD-10-CM

## 2020-09-08 DIAGNOSIS — G47.33 OSA (OBSTRUCTIVE SLEEP APNEA): ICD-10-CM

## 2020-09-08 PROCEDURE — G2012 BRIEF CHECK IN BY MD/QHP: HCPCS | Performed by: FAMILY MEDICINE

## 2020-09-08 RX ORDER — PHENTERMINE HYDROCHLORIDE 37.5 MG/1
37.5 TABLET ORAL DAILY
Qty: 30 TABLET | Refills: 2 | Status: SHIPPED | OUTPATIENT
Start: 2020-09-08

## 2020-09-08 RX ORDER — SIMVASTATIN 20 MG
20 TABLET ORAL
Qty: 90 TABLET | Refills: 3 | Status: SHIPPED | OUTPATIENT
Start: 2020-09-08 | End: 2021-10-27 | Stop reason: SDUPTHER

## 2020-09-08 NOTE — PROGRESS NOTES
Assessment/Plan:    No problem-specific Assessment & Plan notes found for this encounter  Diagnoses and all orders for this visit:    Obesity (BMI 35 0-39 9 without comorbidity)  -     phentermine (ADIPEX-P) 37 5 MG tablet; Take 1 tablet (37 5 mg total) by mouth daily    Anxiety    JOSUÉ (obstructive sleep apnea)    Essential hypertension        -Patient is pursuing Conservative Program  -Initial weight loss goal of 5-10% weight loss for improved health  -Screening labs - 11/14/19  - she is more into "natural" remedies but is now interested in AOM  EKG: Sinus bradycardia, Possible Left atrial enlargement, Nonspecific ST and T wave abnormality,  No previous ECGs available  Discussed proper use and possible side effects of phentermine       Initial: 181 5lbs  Current: 173lbs reported(182 8lbs)   Change:  -8 5lbs  Goal: 140lbs     Goals:  Food log (ie ) www myfitnesspal com,sparkpeople  com,loseit com,calorieking  com,etc  baritastic  No sugary beverages  At least 64oz of water daily  Increase physical activity by 10 minutes daily  Gradually increase physical activity to a goal of 5 days per week for 30 minutes of MODERATE intensity PLUS 2 days per week of FULL BODY resistance training  5-10 servings of fruits and vegetables per day, 25-35 grams of dietary fiber per day and 4920-6201 calories per day    Follow up in approximately 3 months with Non-Surgical Physician/Advanced Practitioner  Subjective:   Chief Complaint   Patient presents with    Virtual Brief Visit       Patient ID: Dallas Peterson  is a 44 y o  female with excess weight/obesity here to pursue weight management  Patient is pursuing Conservative Program      HPI:    2 mo follow up  Total loss -8 6lbs    Started on phentermine last visit  Reports dry mouth, no other side effects  Anxiety: controlled, off of celexa  Exercise: walks 20-30min x 5 days a week  Nutrition: unchanged, following meal plan       She states her blood pressure is stable  Virtual Brief Visit    Assessment/Plan:    Problem List Items Addressed This Visit        Respiratory    JOSUÉ (obstructive sleep apnea)       Cardiovascular and Mediastinum    Essential hypertension       Other    Anxiety    Obesity (BMI 35 0-39 9 without comorbidity) - Primary                Reason for visit is   Chief Complaint   Patient presents with    Virtual Brief Visit        Encounter provider Liza Cobb MD    Provider located at [facility] [address] [id number]    Recent Visits  No visits were found meeting these conditions  Showing recent visits within past 7 days and meeting all other requirements     Today's Visits  Date Type Provider Dept   20 Daria Cho MD [facility]   Showing today's visits and meeting all other requirements     Future Appointments  No visits were found meeting these conditions  Showing future appointments within next 150 days and meeting all other requirements        After connecting through telephone, the patient was identified by name and date of birth  Jeannine Bishop was informed that this is a telemedicine visit and that the visit is being conducted through telephone  My office door was closed  No one else was in the room  She acknowledged consent and understanding of privacy and security of the platform  The patient has agreed to participate and understands she can discontinue the visit at any time  Patient is aware this is a billable service  Subjective    Jeannine Bishop is a 44 y o  female follow up    HPI     Past Medical History:   Diagnosis Date    Hyperlipidemia     Hypertension     JOSUÉ (obstructive sleep apnea) 2020    Sinus pressure        Past Surgical History:   Procedure Laterality Date     SECTION, LOW TRANSVERSE      last assessed 8/20/15     LAPAROSCOPIC TOTAL HYSTERECTOMY  2017  LARYNX SURGERY      last assessed 8/20/15     [place] COLONOSCOPY FLX DX W/COLLJ SPEC WHEN PFRMD N/A 3/26/2019    Procedure: COLONOSCOPY;  Surgeon: Branden Mistry MD;  Location: [facility]; Service: Gastroenterology    [place] ESOPHAGOGASTRODUODENOSCOPY TRANSORAL DIAGNOSTIC N/A 3/26/2019    Procedure: ESOPHAGOGASTRODUODENOSCOPY (EGD); Surgeon: Branden Mistry MD;  Location: [facility]; Service: Gastroenterology    TUBAL LIGATION      last assessed 8/20/15       Current Outpatient Medications   Medication Sig Dispense Refill    meloxicam (MOBIC) 7 5 mg tablet Take 1 tablet (7 5 mg total) by mouth daily 30 tablet 5    phentermine (ADIPEX-P) 37 5 MG tablet Take 1/2 tab daily before breakfast for 2 weeks then if tolerated take 1/2 tab bid 30 tablet 2    Probiotic Product (PROBIOTIC DAILY PO) Take by mouth daily      RA VITAMIN D-3 125 MCG (5000 UT) capsule Take 1 capsule (5,000 Units total) by mouth daily 90 capsule 1    simvastatin (ZOCOR) 20 mg tablet Take 1 tablet (20 mg total) by mouth daily at bedtime 90 tablet 3    valACYclovir (VALTREX) 1,000 mg tablet Take 1 tablet (1,000 mg total) by mouth daily 30 tablet 0     No current facility-administered medications for this visit  No Known Allergies    Review of Systems   Constitutional: Negative for activity change and appetite change  Respiratory: Negative  Cardiovascular: Negative  Gastrointestinal: Negative  Genitourinary: Negative  Musculoskeletal: Negative for arthralgias  Skin: Negative for rash  Psychiatric/Behavioral: Negative  Vitals:    09/08/20 1423   Weight: 78 5 kg (173 lb)   Height: 5' 2" (1 575 m)         I spent 30 minutes directly with the patient during this visit    VIRTUAL VISIT DISCLAIMER    Vasiliy Donovan acknowledges that she has consented to an online visit or consultation   She understands that the online visit is based solely on information provided by her, and that, in the absence of a face-to-face physical evaluation by the physician, the diagnosis she receives is both limited and provisional in terms of accuracy and completeness  This is not intended to replace a full medical face-to-face evaluation by the physician  Lenice Shaker understands and accepts these terms

## 2020-09-17 ENCOUNTER — TELEPHONE (OUTPATIENT)
Dept: SLEEP CENTER | Facility: CLINIC | Age: 50
End: 2020-09-17

## 2020-09-17 NOTE — TELEPHONE ENCOUNTER
[facility]               Evidence of nocturnal abnormal breathing   Stage N3 significantly diminished, sleep architecture significantly fragmented   No other significant findings   No further workup needed at this time  Ariela Campbell results will be discussed at the follow-up visit  Left message for the patient to call back and discuss results   Patient needs to schedule follow with Dr Blair Castellon

## 2020-09-28 ENCOUNTER — TELEPHONE (OUTPATIENT)
Dept: SLEEP CENTER | Facility: CLINIC | Age: 50
End: 2020-09-28

## 2020-10-05 ENCOUNTER — OFFICE VISIT (OUTPATIENT)
Dept: SLEEP CENTER | Facility: CLINIC | Age: 50
End: 2020-10-05
Payer: COMMERCIAL

## 2020-10-05 VITALS
SYSTOLIC BLOOD PRESSURE: 118 MMHG | OXYGEN SATURATION: 99 % | HEIGHT: 62 IN | WEIGHT: 177.4 LBS | DIASTOLIC BLOOD PRESSURE: 82 MMHG | HEART RATE: 67 BPM | BODY MASS INDEX: 32.65 KG/M2

## 2020-10-05 DIAGNOSIS — F41.9 ANXIETY: ICD-10-CM

## 2020-10-05 DIAGNOSIS — E66.09 CLASS 1 OBESITY DUE TO EXCESS CALORIES WITH SERIOUS COMORBIDITY AND BODY MASS INDEX (BMI) OF 32.0 TO 32.9 IN ADULT: ICD-10-CM

## 2020-10-05 DIAGNOSIS — R06.83 SNORING: Primary | ICD-10-CM

## 2020-10-05 PROCEDURE — 1036F TOBACCO NON-USER: CPT | Performed by: PSYCHIATRY & NEUROLOGY

## 2020-10-05 PROCEDURE — 3079F DIAST BP 80-89 MM HG: CPT | Performed by: PSYCHIATRY & NEUROLOGY

## 2020-10-05 PROCEDURE — 99214 OFFICE O/P EST MOD 30 MIN: CPT | Performed by: PSYCHIATRY & NEUROLOGY

## 2020-10-15 ENCOUNTER — TELEPHONE (OUTPATIENT)
Dept: FAMILY MEDICINE CLINIC | Facility: CLINIC | Age: 50
End: 2020-10-15

## 2020-10-15 DIAGNOSIS — Z92.89 HISTORY OF POSITIVE PPD: Primary | ICD-10-CM

## 2020-10-16 ENCOUNTER — HOSPITAL ENCOUNTER (OUTPATIENT)
Dept: RADIOLOGY | Facility: HOSPITAL | Age: 50
Discharge: HOME | End: 2020-10-16
Payer: COMMERCIAL

## 2020-10-16 DIAGNOSIS — Z92.89 HISTORY OF POSITIVE PPD: ICD-10-CM

## 2020-10-16 PROCEDURE — 71046 X-RAY EXAM CHEST 2 VIEWS: CPT

## 2020-12-01 ENCOUNTER — OFFICE VISIT (OUTPATIENT)
Dept: OBGYN CLINIC | Facility: CLINIC | Age: 50
End: 2020-12-01

## 2020-12-01 ENCOUNTER — TELEPHONE (OUTPATIENT)
Dept: OBGYN CLINIC | Facility: CLINIC | Age: 50
End: 2020-12-01

## 2020-12-01 VITALS
SYSTOLIC BLOOD PRESSURE: 177 MMHG | DIASTOLIC BLOOD PRESSURE: 95 MMHG | WEIGHT: 176.6 LBS | BODY MASS INDEX: 32.3 KG/M2 | HEART RATE: 59 BPM

## 2020-12-01 DIAGNOSIS — R03.0 ELEVATED BP WITHOUT DIAGNOSIS OF HYPERTENSION: ICD-10-CM

## 2020-12-01 DIAGNOSIS — R35.0 FREQUENT URINATION: Primary | ICD-10-CM

## 2020-12-01 LAB
BILIRUB UR QL STRIP: NEGATIVE
CLARITY UR: NORMAL
COLOR UR: YELLOW
GLUCOSE UR STRIP-MCNC: NEGATIVE MG/DL
HGB UR QL STRIP.AUTO: NEGATIVE
KETONES UR STRIP-MCNC: NEGATIVE MG/DL
LEUKOCYTE ESTERASE UR QL STRIP: NEGATIVE
NITRITE UR QL STRIP: NEGATIVE
PH UR STRIP.AUTO: 7 [PH]
PROT UR STRIP-MCNC: NEGATIVE MG/DL
SL AMB  POCT GLUCOSE, UA: NORMAL
SL AMB LEUKOCYTE ESTERASE,UA: NORMAL
SL AMB POCT BILIRUBIN,UA: NORMAL
SL AMB POCT BLOOD,UA: NORMAL
SL AMB POCT CLARITY,UA: CLEAR
SL AMB POCT COLOR,UA: YELLOW
SL AMB POCT KETONES,UA: NORMAL
SL AMB POCT NITRITE,UA: NORMAL
SL AMB POCT PH,UA: 6.5
SL AMB POCT SPECIFIC GRAVITY,UA: 1.01
SL AMB POCT URINE PROTEIN: NORMAL
SL AMB POCT UROBILINOGEN: 0.2
SP GR UR STRIP.AUTO: 1.01 (ref 1–1.03)
UROBILINOGEN UR QL STRIP.AUTO: 0.2 E.U./DL

## 2020-12-01 PROCEDURE — 99202 OFFICE O/P NEW SF 15 MIN: CPT | Performed by: NURSE PRACTITIONER

## 2020-12-01 PROCEDURE — 81003 URINALYSIS AUTO W/O SCOPE: CPT | Performed by: NURSE PRACTITIONER

## 2020-12-01 PROCEDURE — 81002 URINALYSIS NONAUTO W/O SCOPE: CPT | Performed by: NURSE PRACTITIONER

## 2020-12-01 PROCEDURE — 87086 URINE CULTURE/COLONY COUNT: CPT | Performed by: NURSE PRACTITIONER

## 2020-12-02 LAB — BACTERIA UR CULT: NORMAL

## 2020-12-07 ENCOUNTER — OFFICE VISIT (OUTPATIENT)
Dept: FAMILY MEDICINE CLINIC | Facility: CLINIC | Age: 50
End: 2020-12-07

## 2020-12-07 VITALS
DIASTOLIC BLOOD PRESSURE: 90 MMHG | BODY MASS INDEX: 32.39 KG/M2 | SYSTOLIC BLOOD PRESSURE: 140 MMHG | HEART RATE: 60 BPM | WEIGHT: 176 LBS | HEIGHT: 62 IN | OXYGEN SATURATION: 98 % | RESPIRATION RATE: 16 BRPM | TEMPERATURE: 98 F

## 2020-12-07 DIAGNOSIS — I10 HYPERTENSION, UNSPECIFIED TYPE: ICD-10-CM

## 2020-12-07 DIAGNOSIS — R00.2 PALPITATIONS: Primary | ICD-10-CM

## 2020-12-07 PROCEDURE — 3077F SYST BP >= 140 MM HG: CPT | Performed by: INTERNAL MEDICINE

## 2020-12-07 PROCEDURE — 1036F TOBACCO NON-USER: CPT | Performed by: INTERNAL MEDICINE

## 2020-12-07 PROCEDURE — 99213 OFFICE O/P EST LOW 20 MIN: CPT | Performed by: INTERNAL MEDICINE

## 2020-12-07 PROCEDURE — 3008F BODY MASS INDEX DOCD: CPT | Performed by: PSYCHIATRY & NEUROLOGY

## 2020-12-07 PROCEDURE — 3008F BODY MASS INDEX DOCD: CPT | Performed by: INTERNAL MEDICINE

## 2020-12-07 PROCEDURE — 3080F DIAST BP >= 90 MM HG: CPT | Performed by: INTERNAL MEDICINE

## 2020-12-07 RX ORDER — LISINOPRIL 10 MG/1
10 TABLET ORAL DAILY
Qty: 90 TABLET | Refills: 3 | Status: SHIPPED | OUTPATIENT
Start: 2020-12-07 | End: 2022-01-27 | Stop reason: SDUPTHER

## 2020-12-08 ENCOUNTER — APPOINTMENT (OUTPATIENT)
Dept: LAB | Facility: HOSPITAL | Age: 50
End: 2020-12-08
Payer: COMMERCIAL

## 2020-12-08 DIAGNOSIS — I10 HYPERTENSION, UNSPECIFIED TYPE: ICD-10-CM

## 2020-12-08 DIAGNOSIS — R00.2 PALPITATIONS: ICD-10-CM

## 2020-12-08 LAB
ALBUMIN SERPL BCP-MCNC: 3.6 G/DL (ref 3.5–5)
ALP SERPL-CCNC: 51 U/L (ref 46–116)
ALT SERPL W P-5'-P-CCNC: 29 U/L (ref 12–78)
ANION GAP SERPL CALCULATED.3IONS-SCNC: 7 MMOL/L (ref 4–13)
AST SERPL W P-5'-P-CCNC: 19 U/L (ref 5–45)
BILIRUB SERPL-MCNC: 0.55 MG/DL (ref 0.2–1)
BUN SERPL-MCNC: 17 MG/DL (ref 5–25)
CALCIUM SERPL-MCNC: 9.4 MG/DL (ref 8.3–10.1)
CHLORIDE SERPL-SCNC: 104 MMOL/L (ref 100–108)
CO2 SERPL-SCNC: 29 MMOL/L (ref 21–32)
CREAT SERPL-MCNC: 0.83 MG/DL (ref 0.6–1.3)
CREAT UR-MCNC: 285 MG/DL
EST. AVERAGE GLUCOSE BLD GHB EST-MCNC: 108 MG/DL
GFR SERPL CREATININE-BSD FRML MDRD: 81 ML/MIN/1.73SQ M
GLUCOSE P FAST SERPL-MCNC: 93 MG/DL (ref 65–99)
HBA1C MFR BLD: 5.4 %
MICROALBUMIN UR-MCNC: 11.4 MG/L (ref 0–20)
MICROALBUMIN/CREAT 24H UR: 4 MG/G CREATININE (ref 0–30)
POTASSIUM SERPL-SCNC: 4.3 MMOL/L (ref 3.5–5.3)
PROT SERPL-MCNC: 7.4 G/DL (ref 6.4–8.2)
SODIUM SERPL-SCNC: 140 MMOL/L (ref 136–145)
TSH SERPL DL<=0.05 MIU/L-ACNC: 1.9 UIU/ML (ref 0.36–3.74)

## 2020-12-08 PROCEDURE — 84443 ASSAY THYROID STIM HORMONE: CPT

## 2020-12-08 PROCEDURE — 83036 HEMOGLOBIN GLYCOSYLATED A1C: CPT

## 2020-12-08 PROCEDURE — 36415 COLL VENOUS BLD VENIPUNCTURE: CPT

## 2020-12-08 PROCEDURE — 80053 COMPREHEN METABOLIC PANEL: CPT

## 2020-12-08 PROCEDURE — 82043 UR ALBUMIN QUANTITATIVE: CPT | Performed by: FAMILY MEDICINE

## 2020-12-08 PROCEDURE — 82570 ASSAY OF URINE CREATININE: CPT | Performed by: FAMILY MEDICINE

## 2021-01-08 ENCOUNTER — TELEPHONE (OUTPATIENT)
Dept: OBGYN CLINIC | Facility: CLINIC | Age: 51
End: 2021-01-08

## 2021-01-11 ENCOUNTER — ANNUAL EXAM (OUTPATIENT)
Dept: OBGYN CLINIC | Facility: CLINIC | Age: 51
End: 2021-01-11

## 2021-01-11 VITALS
SYSTOLIC BLOOD PRESSURE: 140 MMHG | BODY MASS INDEX: 32.24 KG/M2 | WEIGHT: 175.2 LBS | HEART RATE: 70 BPM | DIASTOLIC BLOOD PRESSURE: 90 MMHG | HEIGHT: 62 IN

## 2021-01-11 DIAGNOSIS — Z01.419 ENCOUNTER FOR ANNUAL ROUTINE GYNECOLOGICAL EXAMINATION: Primary | ICD-10-CM

## 2021-01-11 PROBLEM — N85.2 ENLARGED UTERUS: Status: ACTIVE | Noted: 2021-01-11

## 2021-01-11 PROCEDURE — 3080F DIAST BP >= 90 MM HG: CPT | Performed by: NURSE PRACTITIONER

## 2021-01-11 PROCEDURE — 3077F SYST BP >= 140 MM HG: CPT | Performed by: NURSE PRACTITIONER

## 2021-01-11 PROCEDURE — G0145 SCR C/V CYTO,THINLAYER,RESCR: HCPCS | Performed by: NURSE PRACTITIONER

## 2021-01-11 PROCEDURE — 99386 PREV VISIT NEW AGE 40-64: CPT | Performed by: NURSE PRACTITIONER

## 2021-01-11 PROCEDURE — 1036F TOBACCO NON-USER: CPT | Performed by: NURSE PRACTITIONER

## 2021-01-11 PROCEDURE — 3008F BODY MASS INDEX DOCD: CPT | Performed by: NURSE PRACTITIONER

## 2021-01-11 PROCEDURE — 87624 HPV HI-RISK TYP POOLED RSLT: CPT | Performed by: NURSE PRACTITIONER

## 2021-01-11 NOTE — PATIENT INSTRUCTIONS
Mammogram is due in July  PAP results can take up to 2 weeks  Call with needs or concerns  Return in 1 year    COVID-19 Instructions    If you are having any of the following:  Cough   Shortness of breath   Fever  If traveled within past 2 weeks internationally or to high risk [place] states  Or been in contact with someone that has     Please call either:   Your PCP office  [phone], option [phone]    They will screen you over the phone and direct you to the nearest appropriate testing location    DO NOT go to your PCP or OB office without calling first

## 2021-01-11 NOTE — LETTER
2021    [address] [place] [address]  [facility] [id number]     2021    To [address] [place] [address]  : 1970      This letter is to advise you that your recent PAP SMEAR results were reviewed by me and are NORMAL    We will see you in 1 year for your annual exam     Janak Parson

## 2021-01-11 NOTE — PROGRESS NOTES
Assessment/Plan     Diagnoses and all orders for this visit:    Enlarged uterus  -     Cancel: US pelvis complete w transvaginal; Future    Abnormal uterine bleeding (AUB)  -     Cancel: US pelvis complete w transvaginal; Future         Plan  Patient Instructions   Mammogram is due in July    No follow-ups on file  Pablo Vaughn is a 44 y o  female who presents for annual exam  The patient has no complaints today  The patient is sexually active  1patner x 15 years GYN screening history: last pap: was normal  Unsure when The patient is not taking hormone replacement therapy  Patient denies post-menopausal vaginal bleeding  Karina MIRANDAH for fibroids  The patient participates in regular exercise: yes  Discussed calcium and vitamin DThe patient reports that there is not domestic violence in her life  The patient is having menopausal symptoms hot flashes    /90 denies H/A or visual changes, taking Lisinopril as directed, has Family Practice appointment     Menstrual History:  OB History        3    Para   2    Term   2            AB   1    Living   2       SAB   1    TAB        Ectopic        Multiple        Live Births   2                Menarche age: 13  Patient's last menstrual period was 2016  The following portions of the patient's history were reviewed and updated as appropriate: allergies, current medications, past family history, past medical history, past social history, past surgical history and problem list     Review of Systems  Pertinent items are noted in HPI       Objective      /90   Pulse 70   Ht 5' 2" (1 575 m)   Wt 79 5 kg (175 lb 3 2 oz)   LMP 2016   BMI 32 04 kg/m²      General:   alert and oriented, in no acute distress, alert, appears stated age and cooperative   Heart: regular rate and rhythm, S1, S2 normal, no murmur, click, rub or gallop   Lungs: clear to auscultation bilaterally, negative cough or SOB   Thyroid: Negative masses   Abdomen: soft, non-tender, without masses or organomegaly   Vulva: normal   Vagina: normal mucosa   Cervix: no bleeding following Pap, no cervical motion tenderness and no lesions   Uterus: surgically absent   Adnexa: normal adnexa   Breasts: NT, negative masses, discharge or dimpling         Lab Review  PAP ordered

## 2021-01-13 LAB
LAB AP GYN PRIMARY INTERPRETATION: NORMAL
Lab: NORMAL

## 2021-01-16 LAB
HPV HR 12 DNA CVX QL NAA+PROBE: NEGATIVE
HPV16 DNA CVX QL NAA+PROBE: NEGATIVE
HPV18 DNA CVX QL NAA+PROBE: NEGATIVE

## 2021-02-10 ENCOUNTER — HOSPITAL ENCOUNTER (OUTPATIENT)
Dept: NON INVASIVE DIAGNOSTICS | Facility: HOSPITAL | Age: 51
Discharge: HOME | End: 2021-02-10
Payer: COMMERCIAL

## 2021-02-10 DIAGNOSIS — I10 HYPERTENSION, UNSPECIFIED TYPE: ICD-10-CM

## 2021-02-10 DIAGNOSIS — R00.2 PALPITATIONS: ICD-10-CM

## 2021-02-10 PROCEDURE — 93306 TTE W/DOPPLER COMPLETE: CPT | Performed by: INTERNAL MEDICINE

## 2021-02-10 PROCEDURE — 93306 TTE W/DOPPLER COMPLETE: CPT

## 2021-04-15 ENCOUNTER — TELEPHONE (OUTPATIENT)
Dept: FAMILY MEDICINE CLINIC | Facility: CLINIC | Age: 51
End: 2021-04-15

## 2021-04-15 NOTE — TELEPHONE ENCOUNTER
Pt came in and is asking for blood work results from 12/2020 and echo resultsfrom 2/21 ordered by Dr Aditya Mckeon

## 2021-04-28 ENCOUNTER — OFFICE VISIT (OUTPATIENT)
Dept: FAMILY MEDICINE CLINIC | Facility: CLINIC | Age: 51
End: 2021-04-28

## 2021-04-28 VITALS
OXYGEN SATURATION: 98 % | DIASTOLIC BLOOD PRESSURE: 98 MMHG | HEART RATE: 76 BPM | RESPIRATION RATE: 19 BRPM | TEMPERATURE: 97.6 F | WEIGHT: 170 LBS | SYSTOLIC BLOOD PRESSURE: 154 MMHG | BODY MASS INDEX: 31.09 KG/M2

## 2021-04-28 DIAGNOSIS — R35.0 URINE FREQUENCY: Primary | ICD-10-CM

## 2021-04-28 DIAGNOSIS — R31.29 OTHER MICROSCOPIC HEMATURIA: ICD-10-CM

## 2021-04-28 DIAGNOSIS — R10.9 FLANK PAIN: ICD-10-CM

## 2021-04-28 DIAGNOSIS — E78.5 HYPERLIPIDEMIA, UNSPECIFIED HYPERLIPIDEMIA TYPE: ICD-10-CM

## 2021-04-28 LAB
SL AMB  POCT GLUCOSE, UA: NORMAL
SL AMB LEUKOCYTE ESTERASE,UA: NEGATIVE
SL AMB POCT BILIRUBIN,UA: NEGATIVE
SL AMB POCT BLOOD,UA: ABNORMAL
SL AMB POCT CLARITY,UA: CLEAR
SL AMB POCT COLOR,UA: YELLOW
SL AMB POCT KETONES,UA: NEGATIVE
SL AMB POCT NITRITE,UA: NEGATIVE
SL AMB POCT PH,UA: 5
SL AMB POCT SPECIFIC GRAVITY,UA: 1.02
SL AMB POCT URINE PROTEIN: NEGATIVE
SL AMB POCT UROBILINOGEN: NORMAL

## 2021-04-28 PROCEDURE — 81002 URINALYSIS NONAUTO W/O SCOPE: CPT | Performed by: FAMILY MEDICINE

## 2021-04-28 PROCEDURE — 99214 OFFICE O/P EST MOD 30 MIN: CPT | Performed by: FAMILY MEDICINE

## 2021-04-28 PROCEDURE — 87086 URINE CULTURE/COLONY COUNT: CPT | Performed by: FAMILY MEDICINE

## 2021-04-28 NOTE — PROGRESS NOTES
Assessment/Plan:    Hyperlipidemia  Patient stopped statin on her own because she read it has very bad side effects  She has been trying to follow a low fat diet   Will recheck FLP one year after previous FLP (9/2021)        Diagnoses and all orders for this visit:    Urine frequency  -     POCT urine dip  -     Urine culture    Other microscopic hematuria  -     CT renal stone study abdomen pelvis wo contrast; Future    Flank pain  -     CT renal stone study abdomen pelvis wo contrast; Future    Hyperlipidemia, unspecified hyperlipidemia type  -     Comprehensive metabolic panel; Future  -     Lipid panel; Future        UA shows minimal hematuria, given flank pain, ct scan kidney stone protocol ordered  Increase fluid intake    Subjective:      Patient ID: Cierra Hudson is a 46 y o  female  47 yo  female with HTN, hyperlipidemia, chronic LBP here with the following concerns:  1- Left Flank pain, states it is different than her chronic LBP  Pain is mild not radiated, constant with colic pain as well  Has been going on on and off for about 3 months  2- Increased urinary frequency with reported bubbles in her urine  Denies change in color of urine, burning with urination, pain with urination  3- Mole on her back that she feels is growing  The following portions of the patient's history were reviewed and updated as appropriate:   She  has a past medical history of Hyperlipidemia, Hypertension, JOSUÉ (obstructive sleep apnea) (7/16/2020), and Sinus pressure    She   Patient Active Problem List    Diagnosis Date Noted    Enlarged uterus 01/11/2021    JOSUÉ (obstructive sleep apnea) 07/16/2020    Foot pain 02/13/2020    Snoring 01/14/2020    Pain in both feet 01/14/2020    Class 1 obesity due to excess calories with serious comorbidity and body mass index (BMI) of 32 0 to 32 9 in adult 11/17/2019    H  pylori infection 06/17/2019    Low ferritin 01/08/2019    Slow transit constipation 2019    Fatigue 10/08/2018    History of colonoscopy 10/08/2018    Overactive bladder 10/04/2018    Anxiety 2017    Chronic neck pain 2017    Chronic upper back pain 2017    Early satiety 2016    First degree hemorrhoids 2016    Constipation 10/14/2016    Epigastric pain 10/14/2016    Nocturnal leg cramps 2016    Palpitations 2016    Mild vitamin D deficiency 2016    Ovarian cyst, complex 2015    Headache 2015    Hyperlipidemia 2015    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    Osteoarthritis 2014    Hypertension 10/12/2012    Herpes simplex infection 2012     She  has a past surgical history that includes  section, low transverse; Laparoscopic total hysterectomy (2017); Larynx surgery; pr esophagogastroduodenoscopy transoral diagnostic (N/A, 3/26/2019); pr colonoscopy flx dx w/collj spec when pfrmd (N/A, 3/26/2019); and Tubal ligation  Her family history includes Hypertension in her mother  She  reports that she has never smoked  She has never used smokeless tobacco  She reports that she does not drink alcohol or use drugs    Current Outpatient Medications   Medication Sig Dispense Refill    lisinopril (ZESTRIL) 10 mg tablet Take 1 tablet (10 mg total) by mouth daily 90 tablet 3    Probiotic Product (PROBIOTIC DAILY PO) Take by mouth daily      RA VITAMIN D-3 125 MCG (5000 UT) capsule Take 1 capsule (5,000 Units total) by mouth daily 90 capsule 1    meloxicam (MOBIC) 7 5 mg tablet Take 1 tablet (7 5 mg total) by mouth daily (Patient not taking: Reported on 2020) 30 tablet 5    phentermine (ADIPEX-P) 37 5 MG tablet Take 1 tablet (37 5 mg total) by mouth daily (Patient not taking: Reported on 10/5/2020) 30 tablet 2    simvastatin (ZOCOR) 20 mg tablet Take 1 tablet (20 mg total) by mouth daily at bedtime (Patient not taking: Reported on 2020) 90 tablet 3    valACYclovir (VALTREX) 1,000 mg tablet Take 1 tablet (1,000 mg total) by mouth daily (Patient not taking: Reported on 10/5/2020) 30 tablet 0     No current facility-administered medications for this visit  Current Outpatient Medications on File Prior to Visit   Medication Sig    lisinopril (ZESTRIL) 10 mg tablet Take 1 tablet (10 mg total) by mouth daily    Probiotic Product (PROBIOTIC DAILY PO) Take by mouth daily    RA VITAMIN D-3 125 MCG (5000 UT) capsule Take 1 capsule (5,000 Units total) by mouth daily    meloxicam (MOBIC) 7 5 mg tablet Take 1 tablet (7 5 mg total) by mouth daily (Patient not taking: Reported on 12/1/2020)    phentermine (ADIPEX-P) 37 5 MG tablet Take 1 tablet (37 5 mg total) by mouth daily (Patient not taking: Reported on 10/5/2020)    simvastatin (ZOCOR) 20 mg tablet Take 1 tablet (20 mg total) by mouth daily at bedtime (Patient not taking: Reported on 12/1/2020)    valACYclovir (VALTREX) 1,000 mg tablet Take 1 tablet (1,000 mg total) by mouth daily (Patient not taking: Reported on 10/5/2020)     No current facility-administered medications on file prior to visit       Review of Systems   Genitourinary: Positive for frequency  As per HPI   Musculoskeletal: Positive for back pain  Psychiatric/Behavioral: The patient is nervous/anxious  All other systems reviewed and are negative  Objective:      /98 (BP Location: Left arm, Patient Position: Sitting, Cuff Size: Adult)   Pulse 76   Temp 97 6 °F (36 4 °C) (Temporal)   Resp 19   Wt 77 1 kg (170 lb)   LMP 07/02/2016   SpO2 98%   BMI 31 09 kg/m²          Physical Exam  Vitals signs and nursing note reviewed  Constitutional:       Appearance: She is well-developed  HENT:      Head: Normocephalic  Right Ear: External ear normal       Left Ear: External ear normal       Nose: Nose normal    Eyes:      Conjunctiva/sclera: Conjunctivae normal       Pupils: Pupils are equal, round, and reactive to light     Neck: Musculoskeletal: Normal range of motion and neck supple  Thyroid: No thyromegaly  Cardiovascular:      Rate and Rhythm: Normal rate and regular rhythm  Heart sounds: Normal heart sounds  Pulmonary:      Effort: Pulmonary effort is normal       Breath sounds: Normal breath sounds  Abdominal:      Palpations: Abdomen is soft  Tenderness: There is no abdominal tenderness  There is no left CVA tenderness, guarding or rebound  Musculoskeletal: Normal range of motion  General: Tenderness present  Lumbar back: She exhibits tenderness and spasm  Skin:     General: Skin is dry  Neurological:      Mental Status: She is alert and oriented to person, place, and time  Deep Tendon Reflexes: Reflexes are normal and symmetric

## 2021-04-28 NOTE — ASSESSMENT & PLAN NOTE
Patient stopped statin on her own because she read it has very bad side effects  She has been trying to follow a low fat diet   Will recheck FLP one year after previous FLP (9/2021)

## 2021-04-29 LAB — BACTERIA UR CULT: NORMAL

## 2021-05-10 ENCOUNTER — HOSPITAL ENCOUNTER (OUTPATIENT)
Dept: CT IMAGING | Facility: HOSPITAL | Age: 51
Discharge: HOME | End: 2021-05-10
Payer: COMMERCIAL

## 2021-05-10 DIAGNOSIS — R31.29 OTHER MICROSCOPIC HEMATURIA: ICD-10-CM

## 2021-05-10 DIAGNOSIS — R10.9 FLANK PAIN: ICD-10-CM

## 2021-05-10 PROCEDURE — 74176 CT ABD & PELVIS W/O CONTRAST: CPT

## 2021-05-10 PROCEDURE — G1004 CDSM NDSC: HCPCS

## 2021-05-17 ENCOUNTER — TELEPHONE (OUTPATIENT)
Dept: FAMILY MEDICINE CLINIC | Facility: CLINIC | Age: 51
End: 2021-05-17

## 2021-05-17 DIAGNOSIS — R31.29 OTHER MICROSCOPIC HEMATURIA: Primary | ICD-10-CM

## 2021-05-17 NOTE — TELEPHONE ENCOUNTER
Pt is informed  She wanted to also ask if you put a referral in for her to see a specialist regarding her skin tags?

## 2021-05-17 NOTE — TELEPHONE ENCOUNTER
Not at this time, since I would need more information about the skin tags   Can set up a virtual appointment to discuss it

## 2021-05-17 NOTE — TELEPHONE ENCOUNTER
Got it thanks  Please let patient know that her bladder seems to be thicker than it should, would like her to follow up with urology and have a bladder us done  Both orders placed

## 2021-05-24 ENCOUNTER — TELEMEDICINE (OUTPATIENT)
Dept: FAMILY MEDICINE CLINIC | Facility: CLINIC | Age: 51
End: 2021-05-24

## 2021-05-24 DIAGNOSIS — R93.41 ABNORMAL CT SCAN, BLADDER: ICD-10-CM

## 2021-05-24 DIAGNOSIS — D22.9 CHANGE IN MOLE: Primary | ICD-10-CM

## 2021-05-24 PROCEDURE — G2012 BRIEF CHECK IN BY MD/QHP: HCPCS | Performed by: FAMILY MEDICINE

## 2021-05-24 NOTE — PROGRESS NOTES
Virtual Brief Visit    Assessment/Plan:    Problem List Items Addressed This Visit     None      Visit Diagnoses     Change in mole    -  Primary    Relevant Orders    Ambulatory referral to Dermatology    Abnormal CT scan, bladder        Relevant Orders    Ambulatory referral to Urology      Ct scan results from 5/10/2021 reviewed and discussed with patient  Patient has not received call for Urology appointment or bladder US from orders placed on 5/102021  Patient ws provided with phone numbers to call and make US and Urology appointment   Patient states she will also make her own Dermatology appointment           Reason for visit is   Chief Complaint   Patient presents with    Virtual Brief Visit        Encounter provider Lucy Poon MD    Provider located at [address] [id number] [phone]    Recent Visits  Date Type Provider Dept   05/17/21 Telephone [employer] [facility], [facility] 9354 recent visits within past 7 days and meeting all other requirements     Today's Visits  Date Type Provider Dept   05/24/21 Rebecca Blandon MD  [facility] today's visits and meeting all other requirements     Future Appointments  No visits were found meeting these conditions  Showing future appointments within next 150 days and meeting all other requirements        After connecting through [employer] and patient was informed that this is a secure, HIPAA-compliant platform  She agrees to proceed  , the patient was identified by name and date of birth  Odette Burns was informed that this is a telemedicine visit and that the visit is being conducted through [employer] and patient was informed that this is a secure, HIPAA-compliant platform  She agrees to proceed     My office door was closed  No one else was in the room  She acknowledged consent and understanding of privacy and security of the platform   The patient has agreed to participate and understands she can discontinue the visit at any time  Patient is aware this is a billable service  Subjective    Liam Beckford is a 46 y o  female   47 yo  female concerned with ct scan results  As per patient she did not make an appointment for bladder us because she was unsure what the importance/reason for the US was  She states continues with increased urinary frequency and hematuria on and off  Denies dysuria, hesitancy other difficulties with urine  She states she feels the mole on her back is growing and has become very itchy  Past Medical History:   Diagnosis Date    Hyperlipidemia     Hypertension     JOSUÉ (obstructive sleep apnea) 2020    Sinus pressure        Past Surgical History:   Procedure Laterality Date     SECTION, LOW TRANSVERSE      last assessed 8/20/15     LAPAROSCOPIC TOTAL HYSTERECTOMY  2017    LARYNX SURGERY      last assessed 8/20/15     [place] COLONOSCOPY FLX DX W/COLLJ SPEC WHEN PFRMD N/A 3/26/2019    Procedure: COLONOSCOPY;  Surgeon: Joaquín Ny MD;  Location: [facility]; Service: Gastroenterology    [place] ESOPHAGOGASTRODUODENOSCOPY TRANSORAL DIAGNOSTIC N/A 3/26/2019    Procedure: ESOPHAGOGASTRODUODENOSCOPY (EGD); Surgeon: Joaquín Ny MD;  Location: [facility];   Service: Gastroenterology    TUBAL LIGATION      last assessed 8/20/15       Current Outpatient Medications   Medication Sig Dispense Refill    lisinopril (ZESTRIL) 10 mg tablet Take 1 tablet (10 mg total) by mouth daily 90 tablet 3    meloxicam (MOBIC) 7 5 mg tablet Take 1 tablet (7 5 mg total) by mouth daily (Patient not taking: Reported on 2020) 30 tablet 5    phentermine (ADIPEX-P) 37 5 MG tablet Take 1 tablet (37 5 mg total) by mouth daily (Patient not taking: Reported on 10/5/2020) 30 tablet 2    Probiotic Product (PROBIOTIC DAILY PO) Take by mouth daily      RA VITAMIN D-3 125 MCG (5000 UT) capsule Take 1 capsule (5,000 Units total) by mouth daily 90 capsule 1    simvastatin (ZOCOR) 20 mg tablet Take 1 tablet (20 mg total) by mouth daily at bedtime (Patient not taking: Reported on 12/1/2020) 90 tablet 3    valACYclovir (VALTREX) 1,000 mg tablet Take 1 tablet (1,000 mg total) by mouth daily (Patient not taking: Reported on 10/5/2020) 30 tablet 0     No current facility-administered medications for this visit  No Known Allergies    Review of Systems   Genitourinary: Positive for frequency and hematuria  Negative for decreased urine volume, difficulty urinating, dyspareunia, dysuria, enuresis, pelvic pain and urgency  Musculoskeletal: Positive for back pain  Skin:        As per HPI   All other systems reviewed and are negative  There were no vitals filed for this visit  I spent 13 minutes directly with the patient during this visit    VIRTUAL VISIT DISCLAIMER    Sheila Welsh acknowledges that she has consented to an online visit or consultation  She understands that the online visit is based solely on information provided by her, and that, in the absence of a face-to-face physical evaluation by the physician, the diagnosis she receives is both limited and provisional in terms of accuracy and completeness  This is not intended to replace a full medical face-to-face evaluation by the physician  Sheila Welsh understands and accepts these terms  It was my intent to perform this visit via video technology but the patient was not able to do a video connection so the visit was completed via audio telephone only      Patient was sent [employer] link but stated she did not understand how to open the link

## 2021-05-25 ENCOUNTER — HOSPITAL ENCOUNTER (OUTPATIENT)
Dept: ULTRASOUND IMAGING | Facility: HOSPITAL | Age: 51
Discharge: HOME | End: 2021-05-25
Payer: COMMERCIAL

## 2021-05-25 DIAGNOSIS — R31.29 OTHER MICROSCOPIC HEMATURIA: ICD-10-CM

## 2021-05-25 PROCEDURE — 76857 US EXAM PELVIC LIMITED: CPT

## 2021-06-07 ENCOUNTER — TELEPHONE (OUTPATIENT)
Dept: OBGYN CLINIC | Facility: CLINIC | Age: 51
End: 2021-06-07

## 2021-06-21 ENCOUNTER — TELEPHONE (OUTPATIENT)
Dept: UROLOGY | Facility: MEDICAL CENTER | Age: 51
End: 2021-06-21

## 2021-06-21 DIAGNOSIS — R31.29 MICROSCOPIC HEMATURIA: Primary | ICD-10-CM

## 2021-06-21 NOTE — TELEPHONE ENCOUNTER
Patient's CT and ultrasound reviewed  I would recommend a urinalysis microscopy and office visit for consultation with advanced practitioner within the month  Thank you

## 2021-06-24 NOTE — TELEPHONE ENCOUNTER
Called and left message for patient to confirm appointment on July 15th at 945 in the [facility] end office  Informed patient that she will need to get a UA prior to appointment

## 2021-08-25 ENCOUNTER — CONSULT (OUTPATIENT)
Dept: MULTI SPECIALTY CLINIC | Facility: CLINIC | Age: 51
End: 2021-08-25

## 2021-08-25 VITALS — WEIGHT: 163.4 LBS | BODY MASS INDEX: 28.95 KG/M2 | HEIGHT: 63 IN

## 2021-08-25 DIAGNOSIS — D22.9 CHANGE IN MOLE: ICD-10-CM

## 2021-08-25 DIAGNOSIS — L21.9 SEBORRHEIC DERMATITIS: ICD-10-CM

## 2021-08-25 DIAGNOSIS — D48.9 NEOPLASM OF UNCERTAIN BEHAVIOR: Primary | ICD-10-CM

## 2021-08-25 PROCEDURE — 88305 TISSUE EXAM BY PATHOLOGIST: CPT | Performed by: PATHOLOGY

## 2021-08-25 PROCEDURE — 88304 TISSUE EXAM BY PATHOLOGIST: CPT | Performed by: PATHOLOGY

## 2021-08-25 PROCEDURE — 11103 TANGNTL BX SKIN EA SEP/ADDL: CPT | Performed by: DERMATOLOGY

## 2021-08-25 PROCEDURE — 99244 OFF/OP CNSLTJ NEW/EST MOD 40: CPT | Performed by: DERMATOLOGY

## 2021-08-25 PROCEDURE — 11102 TANGNTL BX SKIN SINGLE LES: CPT | Performed by: DERMATOLOGY

## 2021-08-25 RX ORDER — KETOCONAZOLE 20 MG/ML
SHAMPOO TOPICAL
Qty: 120 ML | Refills: 1 | Status: SHIPPED | OUTPATIENT
Start: 2021-08-25

## 2021-08-25 NOTE — PATIENT INSTRUCTIONS
ARTHROPOD BITE    Assessment and Plan:  Based on a thorough discussion of this condition and the management approach to it (including a comprehensive discussion of the known risks, side effects and potential benefits of treatment), the patient (family) agrees to implement the following specific plan:   Recommended to moisturizer daily   Reassured benign; No treatment necessary  NEOPLASM OF UNCERTAIN BEHAVIOR OF SKIN      Assessment and Plan:   I have discussed with the patient that a sample of skin via a "skin biopsy would be potentially helpful to further make a specific diagnosis under the microscope   Based on a thorough discussion of this condition and the management approach to it (including a comprehensive discussion of the known risks, side effects and potential benefits of treatment), the patient (family) agrees to implement the following specific plan:    o Procedure:  Skin Biopsy  After a thorough discussion of treatment options and risk/benefits/alternatives (including but not limited to local pain, scarring, dyspigmentation, blistering, possible superinfection, and inability to confirm a diagnosis via histopathology), verbal and written consent were obtained and portion of the rash was biopsied for tissue sample  See below for consent that was obtained from patient and subsequent Procedure Note  After obtaining informed consent  at which time there was a discussion about the purpose of biopsy  and low risks of infection and bleeding  The area was prepped and draped in the usual fashion  Anesthesia was obtained with 1% lidocaine with epinephrine  A shave biopsy to an appropriate sampling depth was obtained with a sterile blade (such as a 15-blade or DermaBlade)  The resulting wound was covered with surgical ointment and bandaged appropriately  The patient tolerated the procedure well without complications and was without signs of functional compromise        Specimen has been sent for review by Dermatopathology  Standard post-procedure care has been explained and has been included in written form within the patient's copy of Informed Consent  INFORMED CONSENT DISCUSSION AND POST-OPERATIVE INSTRUCTIONS FOR PATIENT    I   RATIONALE FOR PROCEDURE  I understand that a skin biopsy allows the Dermatologist to test a lesion or rash under the microscope to obtain a diagnosis  It usually involves numbing the area with numbing medication and removing a small piece of skin; sometimes the area will be closed with sutures  In this specific procedure, sutures are not usually needed  If any sutures are placed, then they are usually need to be removed in 2 weeks or less  I understand that my Dermatologist recommends that a skin "shave" biopsy be performed today  A local anesthetic, similar to the kind that a dentist uses when filling a cavity, will be injected with a very small needle into the skin area to be sampled  The injected skin and tissue underneath "will go to sleep and become numb so no pain should be felt afterwards  An instrument shaped like a tiny "razor blade" (shave biopsy instrument) will be used to cut a small piece of tissue and skin from the area so that a sample of tissue can be taken and examined more closely under the microscope  A slight amount of bleeding will occur, but it will be stopped with direct pressure and a pressure bandage and any other appropriate methods  I understands that a scar will form where the wound was created  Surgical ointment will be applied to help protect the wound  Sutures are not usually needed      II   RISKS AND POTENTIAL COMPLICATIONS   I understand the risks and potential complications of a skin biopsy include but are not limited to the following:   Bleeding   Infection   Pain   Scar/keloid   Skin discoloration   Incomplete Removal   Recurrence   Nerve Damage/Numbness/Loss of Function   Allergic Reaction to Anesthesia   Biopsies are diagnostic procedures and based on findings additional treatment or evaluation may be required   Loss or destruction of specimen resulting in no additional findings    My Dermatologist has explained to me the nature of the condition, the nature of the procedure, and the benefits to be reasonably expected compared with alternative approaches  My Dermatologist has discussed the likelihood of major risks or complications of this procedure including the specific risks listed above, such as bleeding, infection, and scarring/keloid  I understand that a scar is expected after this procedure  I understand that my physician cannot predict if the scar will form a "keloid," which extends beyond the borders of the wound that is created  A keloid is a thick, painful, and bumpy scar  A keloid can be difficult to treat, as it does not always respond well to therapy, which includes injecting cortisone directly into the keloid every few weeks  While this usually reduces the pain and size of the scar, it does not eliminate it  I understand that photographs may be taken before and after the procedure  These will be maintained as part of the medical providers confidential records and may not be made available to me  I further authorize the medical provider to use the photographs for teaching purposes or to illustrate scientific papers, books, or lectures if in his/her judgment, medical research, education, or science may benefit from its use  I have had an opportunity to fully inquire about the risks and benefits of this procedure and its alternatives  I have been given ample time and opportunity to ask questions and to seek a second opinion if I wished to do so  I acknowledge that there have specifically been no guarantees as to the cosmetic results from the procedure  I am aware that with any procedure there is always the possibility of an unexpected complication  III  POST-PROCEDURAL CARE (WHAT YOU WILL NEED TO DO "AFTER THE BIOPSY" TO OPTIMIZE HEALING)     Keep the area clean and dry  Try NOT to remove the bandage or get it wet for the first 24 hours   Gently clean the area and apply surgical ointment (such as Vaseline petrolatum ointment, which is available "over the counter" and not a prescription) to the biopsy site for up to 2 weeks straight  This acts to protect the wound from the outside world   Sutures are not usually placed in this procedure  If any sutures were placed, return for suture removal as instructed (generally 1 week for the face, 2 weeks for the body)   Take Acetaminophen (Tylenol) for discomfort, if no contraindications  Ibuprofen or aspirin could make bleeding worse   Call our office immediately for signs of infection: fever, chills, increased redness, warmth, tenderness, discomfort/pain, or pus or foul smell coming from the wound  WHAT TO DO IF THERE IS ANY BLEEDING? If a small amount of bleeding is noticed, place a clean cloth over the area and apply firm pressure for ten minutes  Check the wound after 10 minutes of direct pressure  If bleeding persists, try one more time for an additional 10 minutes of direct pressure on the area  If the bleeding becomes heavier or does not stop after the second attempt, or if you have any other questions about this procedure, then please call your [facility] Dermatologist by calling [phone] (SKIN)  I hereby acknowledge that I have reviewed and verified the site with my Dermatologist and have requested and authorized my Dermatologist to proceed with the procedure  XEROSIS ("DRY SKIN")    Physical Exam:   Anatomic Location Affected:  Bilateral hands   Morphological Description:  Slightly erythematous, xerotic skin   Pertinent Positives:   Pertinent Negatives: Additional History of Present Condition:  Pt reports moisturizing with unknown lotion      Assessment and Plan:  Based on a thorough discussion of this condition and the management approach to it (including a comprehensive discussion of the known risks, side effects and potential benefits of treatment), the patient (family) agrees to implement the following specific plan:  Reduce the frequency of washing your hands with soap, recommended to use hand   Use moisturizer like Eucerin,Cerave or Aveeno Cream 3 times a day for the dry skin           Dry skin refers to skin that feels dry to touch  Dry skin has a dull surface with a rough, scaly quality  The skin is less pliable and cracked  When dryness is severe, the skin may become inflamed and fissured  Although any body site can be dry, dry skin tends to affect the shins more than any other site  Dry skin is lacking moisture in the outer horny cell layer (stratum corneum) and this results in cracks in the skin surface  Dry skin is also called xerosis, xeroderma or asteatosis (lack of fat)  It can affect males and females of all ages  There is some racial variability in water and lipid content of the skin   Dry skin that starts in early childhood may be one of about 20 types of ichthyosis (fish-scale skin)  There is often a family history of dry skin   Dry skin is commonly seen in people with atopic dermatitis   Nearly everyone > 60 years has dry skin  Dry skin that begins later may be seen in people with certain diseases and conditions   Postmenopausal women   Hypothyroidism   Chronic renal disease    Malnutrition and weight loss    Subclinical dermatitis    Treatment with certain drugs such as oral retinoids, diuretics and epidermal growth factor receptor inhibitors    People exposed to a dry environment may experience dry skin     Low humidity: in desert climates or cool, windy conditions    Excessive air conditioning    Direct heat from a fire or fan heater    Excessive bathing    Contact with soap, detergents and solvents  Inappropriate topical agents such as alcohol    Frictional irritation from rough clothing or abrasives    Dry skin is due to abnormalities in the integrity of the barrier function of the stratum corneum, which is made up of corneocytes   There is an overall reduction in the lipids in the stratum corneum   Ratio of ceramides, cholesterol and free fatty acids may be normal or altered   There may be a reduction in the proliferation of keratinocytes   Keratinocyte subtypes change in dry skin with a decrease in keratins K1, K10 and increase in K5, K14     Involucrin (a protein) may be expressed early, increasing cell stiffness   The result is retention of corneocytes and reduced water-holding capacity  The inherited forms of ichthyosis are due to loss of function mutations in various genes (listed in parentheses below)  The clinical features of ichthyosis depend on the specific type of ichthyosis:   Ichthyosis vulgaris (FLG)   Recessive X-linked ichthyosis (STS)    Autosomal recessive congenital ichthyosis (ABCA12, TGM1, ALOXE3)    Keratinopathic ichthyoses (KRT1, KRT10, KRT2)    Acquired ichthyosis may be due to:   Metabolic factors: thyroid deficiency    Illness: lymphoma, internal malignancy, sarcoidosis, HIV infection    Drugs: nicotinic acid, kava, protein kinase inhibitors (eg EGFR inhibitors), hydroxyurea  Complications of dry skin:  Dry areas of skin may become itchy, indicating a form of eczema/dermatitis has developed   Atopic eczema -- especially in people with ichthyosis vulgaris    Eczema craquelé -- especially in elderly people  Also called asteatotic eczema    A dry form of nummular dermatitis/discoid eczema -- especially in people that wash their skin excessively  When the dry skin of an elderly person is itchy without a visible rash, it is sometimes called winter itch, 7th age itch, senile pruritus or chronic pruritus of the elderly      Other complications of dry skin may include:   Skin infection when bacteria or viruses penetrate a break in the skin surface    Overheating, especially in some forms of ichthyosis    Food allergy, eg, to peanuts, has been associated with filaggrin mutations    Contact allergy, eg, to nickel, has also been correlated with barrier function defects  How is the type of dry skin diagnosed? The type of dry skin is diagnosed by careful history and examination  In children:   Family history    Age of onset    Appearance at birth, if known    Distribution of dry skin    Other features, eg eczema, abnormal nails, hair, dentition, sight, hearing  In adults:   Medical history    Medications and topical preparations    Bathing frequency and use of soap    Evaluation of environmental factors that may contribute to dry skin  What is the treatment for dry skin? The mainstay of treatment of dry skin and ichthyosis is moisturisers/emollients  They should be applied liberally and often enough to:   Reduce itch    Improve the barrier function    Prevent entry of irritants, bacteria    Reduce transepidermal water loss  When considering which emollient is most suitable, consider:   Severity of the dryness    Tolerance    Personal preference    Cost and availability  Emollients generally work best if applied to damp skin, if pH is below 7 (acidic), and if containing humectants such as urea or propylene glycol  Additional treatments include:   Topical steroid if itchy or there is dermatitis -- choose an emollient base    Topical calcineurin inhibitors if topical steroids are unsuitable  How can dry skin be prevented? Eliminate aggravating factors:   Reduce the frequency of bathing   A humidifier in winter and air conditioner in summer    Compare having a short shower with a prolonged soak in a bath   Use lukewarm, not hot, water      Replace standard soap with a substitute such as a synthetic detergent cleanser, water-miscible emollient, bath oil, anti-pruritic tar oil, colloidal oatmeal etc     Apply an emollient liberally and often, particularly shortly after bathing, and when itchy  The drier the skin, the thicker this should be, especially on the hands  What is the outlook for dry skin? A tendency to dry skin may persist life-long, or it may improve once contributing factors are controlled  SEBORRHEIC KERATOSIS; NON-INFLAMED      Assessment and Plan:  Based on a thorough discussion of this condition and the management approach to it (including a comprehensive discussion of the known risks, side effects and potential benefits of treatment), the patient (family) agrees to implement the following specific plan:   Reassured benign    Seborrheic Keratosis  A seborrheic keratosis is a harmless warty spot that appears during adult life as a common sign of skin aging  Seborrheic keratoses can arise on any area of skin, covered or uncovered, with the usual exception of the palms and soles  They do not arise from mucous membranes  Seborrheic keratoses can have highly variable appearance  Seborrheic keratoses are extremely common  It has been estimated that over 90% of adults over the age of 61 years have one or more of them  They occur in males and females of all races, typically beginning to erupt in the 35s or 45s  They are uncommon under the age of 21 years  The precise cause of seborrhoeic keratoses is not known  Seborrhoeic keratoses are considered degenerative in nature  As time goes by, seborrheic keratoses tend to become more numerous  Some people inherit a tendency to develop a very large number of them; some people may have hundreds of them  The name "seborrheic keratosis" is misleading, because these lesions are not limited to a seborrhoeic distribution (scalp, mid-face, chest, upper back), nor are they formed from sebaceous glands, nor are they associated with sebum -- which is greasy  Seborrheic keratosis may also be called "SK," "Seb K," "basal cell papilloma," "senile wart," or "barnacle "      Researchers have noted:   Eruptive seborrhoeic keratoses can follow sunburn or dermatitis   Skin friction may be the reason they appear in body folds   Viral cause (e g , human papillomavirus) seems unlikely   Stable and clonal mutations or activation of FRFR3, PIK3CA, KALLI, AKT1 and EGFR genes are found in seborrhoeic keratoses   Seborrhoeic keratosis can arise from solar lentigo   FRFR3 mutations also arise in solar lentigines  These mutations are associated with increased age and location on the head and neck, suggesting a role of ultraviolet radiation in these lesions   Seborrheic keratoses do not harbour tumour suppressor gene mutations   Epidermal growth factor receptor inhibitors, which are used to treat some cancers, often result in an increase in verrucal (warty) keratoses  There is no easy way to remove multiple lesions on a single occasion  Unless a specific lesion is "inflamed" and is causing pain or stinging/burning or is bleeding, most insurance companies do not authorize treatment  IDIOPATHIC GUTTATE HYPOMELANOSIS        Assessment and Plan:  Based on a thorough discussion of this condition and the management approach to it (including a comprehensive discussion of the known risks, side effects and potential benefits of treatment), the patient (family) agrees to implement the following specific plan:  · Reassured benign     Assessment and Plan:  Idiopathic guttate hypomelanosis is the name given to 2 to 5-mm flat white spots found on the shins and forearms  "Idiopathic" means the cause is unknown, "guttate" means resembling tear-drops, and "hypomelanosis" refers to the lighter color of the affected areas  Idiopathic guttate hypomelanosis predominantly affects fair-skinned individuals, but it may occasionally arise in darker skin   Although most often found on the shins and sun-exposed parts of the forearms, guttate hypomelanosis may also arise on other sun-exposed areas including the face, neck and shoulders  The white marks are usually smooth with a reduction in the normal skin markings, but they may be slightly scaly  These lesions appear as part of the aging process, becoming quite common in those over 36years of age  They are more common in women than in men  Inherited factors may be relevant as the lesions appear to be more common in family members  The cause of guttate hypomelanosis is speculative  It is thought to be an inevitable part of the aging process, with a gradual reduction in melanocytes - a similar process to greying of hair  Other theories include sun damage (I e , the lesions are a kind of white freckle) and non-sun related seborrhoeic keratoses (I e , degenerative scaly spots)  Idiopathic guttate hypomelanosis does not appear to be due to trauma or viral infection  The white areas do not predispose to skin cancer  In most cases, treatment is not required as the marks are completely harmless  Attempts to destroy the lesions may leave brown marks or larger white marks, which may look worse than the original condition  Ivonne Butler protection is very important; we recommend wearing UPF-rated sun protective clothing and sunglasses whenever possible and applying a moisturizer-sunscreen combination product with SPF 50+ such as Neutrogena Daily Defense at least 3 times a day to sun exposed areas of skin  SEBORRHEIC DERMATITIS    Assessment and Plan:  Based on a thorough discussion of this condition and the management approach to it (including a comprehensive discussion of the known risks, side effects and potential benefits of treatment), the patient (family) agrees to implement the following specific plan:   Start Ketoconazole 2% shampoo   Daily for 2 weeks straight and then on "Mondays, Wednesdays and Fridays":  Lather into scalp and skin on face, neck, chest, and back; leave on for 5 minutes and then rinse off completely  Seborrheic Dermatitis   Seborrheic dermatitis is a common, chronic or relapsing form of eczema/dermatitis that mainly affects the sebaceous, gland-rich regions of the scalp, face, and trunk  There are infantile and adult forms of seborrhoeic dermatitis  It is sometimes associated with psoriasis and, in that clinical scenario, may be referred to as "sebo-psoriasis "  Seborrheic dermatitis is also known as "seborrheic eczema "  Dandruff (also called "pityriasis capitis") is an uninflamed form of seborrhoeic dermatitis  Dandruff presents as bran-like scaly patches scattered within hair-bearing areas of the scalp  In an infant, this condition may be referred to as "cradle cap "  The cause of seborrheic dermatitis is not completely understood  It is associated with proliferation of various species of the skin commensal Malassezia, in its yeast (non-pathogenic) form  Its metabolites (such as the fatty acids oleic acid, malssezin, and indole-3-carbaldehyde) may cause an inflammatory reaction  Differences in skin barrier lipid content and function may account for individual presentations  Infantile Seborrheic Dermatitis  Infantile seborrheic dermatitis affects babies under the age of 1 months and usually resolves by 1012 months of age  Infantile seborrheic dermatitis causes "cradle cap" (diffuse, greasy scaling on scalp)  The rash may spread to affect armpit and groin folds (a type of "napkin dermatitis")  There may be associated salmon-pink colored patches that may flake or peel  The rash in this case is usually not especially itchy, so the baby often appears undisturbed by the rash, even when more generalized  Adult Seborrheic Dermatitis  Adult seborrheic dermatitis tends to begin in late adolescence; prevalence is greatest in young adults and in the elderly  It is more common in males than in females      The following factors are sometimes associated with severe adult seborrheic dermatitis:   Oily skin   Familial tendency to seborrhoeic dermatitis or a family history of psoriasis   Immunosuppression: organ transplant recipient, human immunodeficiency virus (HIV) infection and patients with lymphoma   Neurological and psychiatric diseases: Parkinson disease, tardive dyskinesia, depression, epilepsy, facial nerve palsy, spinal cord injury and congenital disorders such as Down syndrome   Treatment for psoriasis with psoralen and ultraviolet A (PUVA) therapy   Lack of sleep   Stressful events  In adults, seborrheic dermatitis may typically affect the scalp, face (creases around the nose, behind ears, within eyebrows) and upper trunk  Typical clinical features include:   Winter flares, improving in summer following sun exposure   Minimal itch most of the time   Combination oily and dry mid-facial skin   Ill-defined localized scaly patches or diffuse scale in the scalp   Blepharitis; scaly red eyelid margins   [place]-pink, thin, scaly, and ill-defined plaques in skin folds on both sides of the face   Petal or ring-shaped flaky patches on hair-line and on anterior chest   Rash in armpits, under the breasts, in the groin folds and genital creases   Superficial folliculitis (inflamed hair follicles) on cheeks and upper trunk    Seborrheic dermatitis is diagnosed by its clinical appearance and behavior  Skin biopsy may be helpful but is rarely necessary to make this diagnosis

## 2021-08-25 NOTE — PROGRESS NOTES
[address] [facility] Note     Patient Name: Jam Miller  Encounter Date: 8/25/2021     Have you been cared for by a [facility] Dermatologist in the last 3 years and, if so, which one? No    · Have you traveled outside of the [address] [place] [address] in the past 3 months or outside of the [facility] area in the last 2 weeks? YES     May we call your Preferred Phone number to discuss your specific medical information? Yes     May we leave a detailed message that includes your specific medical information? Yes      Today's Chief Concerns:   Concern #1:  Skin growths   Concern #2:      Past Medical History:  Have you personally ever had or currently have any of the following? · Skin cancer (such as Melanoma, Basal Cell Carcinoma, Squamous Cell Carcinoma? (If Yes, please provide more detail)- No  · Eczema: No  · Psoriasis: No  · HIV/AIDS: No  · Hepatitis B or C: No  · Tuberculosis: No  · Systemic Immunosuppression such as Diabetes, Biologic or Immunotherapy, Chemotherapy, Organ Transplantation, Bone Marrow Transplantation (If YES, please provide more detail): No  · Radiation Treatment (If YES, please provide more detail): No  · Any other major medical conditions/concerns? 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 Listen     Family History:  Have any of your "first degree relatives" (parent, brother, sister, or child) had any of the following       · Skin cancer such as Melanoma or Merkel Cell Carcinoma or Pancreatic Cancer? No  · Eczema, Asthma, Hay Fever or Seasonal Allergies: children have seasonal allergies  · Psoriasis or Psoriatic Arthritis: No  · Do any other medical conditions seem to run in your family? If Yes, what condition and which relatives?   YES, father had prostate cancer    Current Medications:       Current Outpatient Medications:   Probiotic Product (PROBIOTIC DAILY PO), Take by mouth daily, Disp: , Rfl:     RA VITAMIN D-3 125 MCG (5000 UT) capsule, Take 1 capsule (5,000 Units total) by mouth daily, Disp: 90 capsule, Rfl: 1    lisinopril (ZESTRIL) 10 mg tablet, Take 1 tablet (10 mg total) by mouth daily (Patient not taking: Reported on 8/25/2021), Disp: 90 tablet, Rfl: 3    meloxicam (MOBIC) 7 5 mg tablet, Take 1 tablet (7 5 mg total) by mouth daily (Patient not taking: Reported on 12/1/2020), Disp: 30 tablet, Rfl: 5    phentermine (ADIPEX-P) 37 5 MG tablet, Take 1 tablet (37 5 mg total) by mouth daily (Patient not taking: Reported on 10/5/2020), Disp: 30 tablet, Rfl: 2    simvastatin (ZOCOR) 20 mg tablet, Take 1 tablet (20 mg total) by mouth daily at bedtime (Patient not taking: Reported on 12/1/2020), Disp: 90 tablet, Rfl: 3    valACYclovir (VALTREX) 1,000 mg tablet, Take 1 tablet (1,000 mg total) by mouth daily (Patient not taking: Reported on 10/5/2020), Disp: 30 tablet, Rfl: 0      Review of Systems:  Have you recently had or currently have any of the following? If YES, what are you doing for the problem? · Fever, chills or unintended weight loss: No  · Sudden loss or change in your vision: No  · Nausea, vomiting or blood in your stool: No  · Painful or swollen joints: No  · Wheezing or cough: No  · Changing mole or non-healing wound: No  · Nosebleeds: No  · Excessive sweating: No  · Easy or prolonged bleeding? No  · Over the last 2 weeks, how often have you been bothered by the following problems? · Taking little interest or pleasure in doing things: 1 - Not at All  · Feeling down, depressed, or hopeless: 1 - Not at All  · Rapid heartbeat with epinephrine:  No    ·   · Any known allergies?       No Known Allergies      Physical Exam:     Was a chaperone (Derm Clinical Assistant) present throughout the entire Physical Exam? Yes     Did the Dermatology Team specifically  the patient on the importance of a Full Skin Exam to be sure that nothing is missed clinically? Yes}  o Did the patient ultimately request or accept a Full Skin Exam?  Yes  o Did the patient specifically refuse to have the areas "under-the-bra" examined by the Dermatologist? No  o Did the patient specifically refuse to have the areas "under-the-underwear" examined by the Dermatologist? No    CONSTITUTIONAL:   Vitals:    08/25/21 0817   Weight: 74 1 kg (163 lb 6 4 oz)   Height: 5' 2 5" (1 588 m)       Today's Height:   5 ft 2 5 inches  Today's Weight (in kilograms):   163 4 lbs      PSYCH: Normal mood and affect  EYES: Normal conjunctiva  ENT: Normal lips and oral mucosa  CARDIOVASCULAR: No edema  RESPIRATORY: Normal respirations  HEME/LYMPH/IMMUNO:  No regional lymphadenopathy except as noted below in "ASSESSMENT AND PLAN BY DIAGNOSIS"    SKIN:  FULL ORGAN SYSTEM EXAM   Hair, Scalp, Ears, Face Normal except as noted below in Assessment   Neck, Cervical Chain Nodes Normal except as noted below in Assessment   Right Arm/Hand/Fingers Normal except as noted below in Assessment   Left Arm/Hand/Fingers Normal except as noted below in Assessment   Chest/Breasts/Axillae Viewed areas Normal except as noted below in Assessment   Abdomen, Umbilicus Normal except as noted below in Assessment   Back/Spine Normal except as noted below in Assessment   Groin/Genitalia/Buttocks NOT EXAMINED   Right Leg, Foot, Toes Normal except as noted below in Assessment   Left Leg, Foot, Toes Normal except as noted below in Assessment        Assessment and Plan by Diagnosis:    History of Present Condition:     Duration:  How long has this been an issue for you?    o  3 years   Location Affected:  Where on the body is this affecting you?    o  back, left thigh, left axillla   Quality:  Is there any bleeding, pain, itch, burning/irritation, or redness associated with the skin lesion?     o  back lesion itches   Severity:  Describe any bleeding, pain, itch, burning/irritation, or redness on a scale of 1 to 10 (with 10 being the worst)  o  bothersome   Timing:  Does this condition seem to be there pretty constantly or do you notice it more at specific times throughout the day? o  constant   Context:  Have you ever noticed that this condition seems to be associated with specific activities you do?    o  no   Modifying Factors:    o Anything that seems to make the condition worse?    -  nothing  o What have you tried to do to make the condition better?    -  tried OTC skin tag removal   Associated Signs and Symptoms:  Does this skin lesion seem to be associated with any of the following:  o  SL AMB DERM SIGNS AND SYMPTOMS: Itching and Scratching       ARTHROPOD BITE  Physical Exam:   Anatomic Location Affected:  Right upper back    Morphological Description:  Erythematous papules and patches   Pertinent Positives:   Pertinent Negatives: Additional History of Present Condition:  Patient reports daughter to have the similar lesions  Assessment and Plan:  Based on a thorough discussion of this condition and the management approach to it (including a comprehensive discussion of the known risks, side effects and potential benefits of treatment), the patient (family) agrees to implement the following specific plan:   Recommended to moisturizer daily   Reassured benign; No treatment necessary   RTC if lesions do not resolve  NEOPLASM OF UNCERTAIN BEHAVIOR OF SKIN    Physical Exam:   (Anatomic Location); (Size and Morphological Description); (Differential Diagnosis):  Specimen A: Location: Back Skin; Shave biopsy;  46y o  year old  Female with a Description:Brown pedunculated papule ; Differential diagnosis:Acrochrodon  versus nevus    Specimen B: Location: Left axilla Skin; Shave biopsy;  46y o  year old  Female with a Description:Brown pedunculated papule ; Differential diagnosis:Acrochordon versus nevus    Specimen C: Location: Left inner thigh; Skin; Shave biopsy;  46 y o  year old  Female with a Description: Mason Amador pedunculated papule; Differential diagnosis: Acrochordon versus Nevus  o    Pertinent Positives:   Pertinent Negatives: Additional History of Present Condition:  Patient reports irritation with clothing at site of lesions  Assessment and Plan:   I have discussed with the patient that a sample of skin via a "skin biopsy would be potentially helpful to further make a specific diagnosis under the microscope   Based on a thorough discussion of this condition and the management approach to it (including a comprehensive discussion of the known risks, side effects and potential benefits of treatment), the patient (family) agrees to implement the following specific plan:    o Procedure:  Skin Biopsy  After a thorough discussion of treatment options and risk/benefits/alternatives (including but not limited to local pain, scarring, dyspigmentation, blistering, possible superinfection, and inability to confirm a diagnosis via histopathology), verbal and written consent were obtained and portion of the rash was biopsied for tissue sample  See below for consent that was obtained from patient and subsequent Procedure Note  PROCEDURE SHAVE BIOPSY NOTE:     Performing Physician: Dr Beatriz Garibay Anatomic Location; Clinical Description with size (cm); Pre-Op Diagnosis:   Specimen A: Location: Back Skin; Shave biopsy;  46y o  year old  Female with a Description:Brown pedunculated papule ; Differential diagnosis:Acrochrodon  versus nevus    Specimen B: Location: Left axilla Skin; Shave biopsy;  46y o  year old  Female with a Description:Brown pedunculated papule ; Differential diagnosis:Acrochordon versus nevus    Specimen C: Location: Left inner thigh; Skin; Shave biopsy;  46y o  year old  Female with a Description: Brown Pedunculated papule; Differential diagnosis: Acrochordon versus Nevus       Post-op diagnosis: Same      Local anesthesia: 1% xylocaine with epi under the microscope  A slight amount of bleeding will occur, but it will be stopped with direct pressure and a pressure bandage and any other appropriate methods  I understands that a scar will form where the wound was created  Surgical ointment will be applied to help protect the wound  Sutures are not usually needed  II   RISKS AND POTENTIAL COMPLICATIONS   I understand the risks and potential complications of a skin biopsy include but are not limited to the following:   Bleeding   Infection   Pain   Scar/keloid   Skin discoloration   Incomplete Removal   Recurrence   Nerve Damage/Numbness/Loss of Function   Allergic Reaction to Anesthesia   Biopsies are diagnostic procedures and based on findings additional treatment or evaluation may be required   Loss or destruction of specimen resulting in no additional findings    My Dermatologist has explained to me the nature of the condition, the nature of the procedure, and the benefits to be reasonably expected compared with alternative approaches  My Dermatologist has discussed the likelihood of major risks or complications of this procedure including the specific risks listed above, such as bleeding, infection, and scarring/keloid  I understand that a scar is expected after this procedure  I understand that my physician cannot predict if the scar will form a "keloid," which extends beyond the borders of the wound that is created  A keloid is a thick, painful, and bumpy scar  A keloid can be difficult to treat, as it does not always respond well to therapy, which includes injecting cortisone directly into the keloid every few weeks  While this usually reduces the pain and size of the scar, it does not eliminate it  I understand that photographs may be taken before and after the procedure  These will be maintained as part of the medical providers confidential records and may not be made available to me    I further authorize the medical provider to use the photographs for teaching purposes or to illustrate scientific papers, books, or lectures if in his/her judgment, medical research, education, or science may benefit from its use  I have had an opportunity to fully inquire about the risks and benefits of this procedure and its alternatives  I have been given ample time and opportunity to ask questions and to seek a second opinion if I wished to do so  I acknowledge that there have specifically been no guarantees as to the cosmetic results from the procedure  I am aware that with any procedure there is always the possibility of an unexpected complication  III  POST-PROCEDURAL CARE (WHAT YOU WILL NEED TO DO "AFTER THE BIOPSY" TO OPTIMIZE HEALING)     Keep the area clean and dry  Try NOT to remove the bandage or get it wet for the first 24 hours   Gently clean the area and apply surgical ointment (such as Vaseline petrolatum ointment, which is available "over the counter" and not a prescription) to the biopsy site for up to 2 weeks straight  This acts to protect the wound from the outside world   Sutures are not usually placed in this procedure  If any sutures were placed, return for suture removal as instructed (generally 1 week for the face, 2 weeks for the body)   Take Acetaminophen (Tylenol) for discomfort, if no contraindications  Ibuprofen or aspirin could make bleeding worse   Call our office immediately for signs of infection: fever, chills, increased redness, warmth, tenderness, discomfort/pain, or pus or foul smell coming from the wound  WHAT TO DO IF THERE IS ANY BLEEDING? If a small amount of bleeding is noticed, place a clean cloth over the area and apply firm pressure for ten minutes  Check the wound after 10 minutes of direct pressure  If bleeding persists, try one more time for an additional 10 minutes of direct pressure on the area    If the bleeding becomes heavier or does not stop after the second attempt, or if you have any other questions about this procedure, then please call your [facility] by calling [phone] (SKIN)  I hereby acknowledge that I have reviewed and verified the site with my Dermatologist and have requested and authorized my Dermatologist to proceed with the procedure  XEROSIS ("DRY SKIN")    Physical Exam:   Anatomic Location Affected:  Bilateral hands   Morphological Description:  Slightly erythematous, xerotic skin   Pertinent Positives:   Pertinent Negatives: Additional History of Present Condition:  Pt reports moisturizing with unknown lotion  Assessment and Plan:  Based on a thorough discussion of this condition and the management approach to it (including a comprehensive discussion of the known risks, side effects and potential benefits of treatment), the patient (family) agrees to implement the following specific plan:  Reduce the frequency of washing your hands with soap, recommended to use hand   Use moisturizer like Eucerin,Cerave or Aveeno Cream 3 times a day for the dry skin           Dry skin refers to skin that feels dry to touch  Dry skin has a dull surface with a rough, scaly quality  The skin is less pliable and cracked  When dryness is severe, the skin may become inflamed and fissured  Although any body site can be dry, dry skin tends to affect the shins more than any other site  Dry skin is lacking moisture in the outer horny cell layer (stratum corneum) and this results in cracks in the skin surface  Dry skin is also called xerosis, xeroderma or asteatosis (lack of fat)  It can affect males and females of all ages  There is some racial variability in water and lipid content of the skin   Dry skin that starts in early childhood may be one of about 20 types of ichthyosis (fish-scale skin)  There is often a family history of dry skin   Dry skin is commonly seen in people with atopic dermatitis     Nearly everyone > 60 years has dry skin  Dry skin that begins later may be seen in people with certain diseases and conditions   Postmenopausal women   Hypothyroidism   Chronic renal disease    Malnutrition and weight loss    Subclinical dermatitis    Treatment with certain drugs such as oral retinoids, diuretics and epidermal growth factor receptor inhibitors    People exposed to a dry environment may experience dry skin   Low humidity: in desert climates or cool, windy conditions    Excessive air conditioning    Direct heat from a fire or fan heater    Excessive bathing    Contact with soap, detergents and solvents    Inappropriate topical agents such as alcohol    Frictional irritation from rough clothing or abrasives    Dry skin is due to abnormalities in the integrity of the barrier function of the stratum corneum, which is made up of corneocytes   There is an overall reduction in the lipids in the stratum corneum   Ratio of ceramides, cholesterol and free fatty acids may be normal or altered   There may be a reduction in the proliferation of keratinocytes   Keratinocyte subtypes change in dry skin with a decrease in keratins K1, K10 and increase in K5, K14     Involucrin (a protein) may be expressed early, increasing cell stiffness   The result is retention of corneocytes and reduced water-holding capacity  The inherited forms of ichthyosis are due to loss of function mutations in various genes (listed in parentheses below)  The clinical features of ichthyosis depend on the specific type of ichthyosis:   Ichthyosis vulgaris (FLG)      Recessive X-linked ichthyosis (STS)    Autosomal recessive congenital ichthyosis (ABCA12, TGM1, ALOXE3)    Keratinopathic ichthyoses (KRT1, KRT10, KRT2)    Acquired ichthyosis may be due to:   Metabolic factors: thyroid deficiency    Illness: lymphoma, internal malignancy, sarcoidosis, HIV infection    Drugs: nicotinic acid, kava, protein kinase inhibitors (eg EGFR inhibitors), hydroxyurea  Complications of dry skin:  Dry areas of skin may become itchy, indicating a form of eczema/dermatitis has developed   Atopic eczema -- especially in people with ichthyosis vulgaris    Eczema craquelé -- especially in elderly people  Also called asteatotic eczema    A dry form of nummular dermatitis/discoid eczema -- especially in people that wash their skin excessively  When the dry skin of an elderly person is itchy without a visible rash, it is sometimes called winter itch, 7th age itch, senile pruritus or chronic pruritus of the elderly  Other complications of dry skin may include:   Skin infection when bacteria or viruses penetrate a break in the skin surface    Overheating, especially in some forms of ichthyosis    Food allergy, eg, to peanuts, has been associated with filaggrin mutations    Contact allergy, eg, to nickel, has also been correlated with barrier function defects  How is the type of dry skin diagnosed? The type of dry skin is diagnosed by careful history and examination  In children:   Family history    Age of onset    Appearance at birth, if known    Distribution of dry skin    Other features, eg eczema, abnormal nails, hair, dentition, sight, hearing  In adults:   Medical history    Medications and topical preparations    Bathing frequency and use of soap    Evaluation of environmental factors that may contribute to dry skin  What is the treatment for dry skin? The mainstay of treatment of dry skin and ichthyosis is moisturisers/emollients  They should be applied liberally and often enough to:   Reduce itch    Improve the barrier function    Prevent entry of irritants, bacteria    Reduce transepidermal water loss  When considering which emollient is most suitable, consider:   Severity of the dryness    Tolerance    Personal preference    Cost and availability      Emollients generally work best if applied to damp skin, if pH is below 7 (acidic), and if containing humectants such as urea or propylene glycol  Additional treatments include:   Topical steroid if itchy or there is dermatitis -- choose an emollient base    Topical calcineurin inhibitors if topical steroids are unsuitable  How can dry skin be prevented? Eliminate aggravating factors:   Reduce the frequency of bathing   A humidifier in winter and air conditioner in summer    Compare having a short shower with a prolonged soak in a bath   Use lukewarm, not hot, water   Replace standard soap with a substitute such as a synthetic detergent cleanser, water-miscible emollient, bath oil, anti-pruritic tar oil, colloidal oatmeal etc     Apply an emollient liberally and often, particularly shortly after bathing, and when itchy  The drier the skin, the thicker this should be, especially on the hands  What is the outlook for dry skin? A tendency to dry skin may persist life-long, or it may improve once contributing factors are controlled  SEBORRHEIC KERATOSIS; NON-INFLAMED    Physical Exam:   Anatomic Location Affected:  Chest, right dorsal hand   Morphological Description:  Flat and raised, waxy, smooth to warty textured, yellow to brownish-grey to dark brown to blackish, discrete, "stuck-on" appearing papules   Dark brown 1 cm plaque on anterior chest   Pertinent Positives:   Pertinent Negatives: Additional History of Present Condition:  Present on exam  Pt reports no change in spot on right chest over the years  Assessment and Plan:  Based on a thorough discussion of this condition and the management approach to it (including a comprehensive discussion of the known risks, side effects and potential benefits of treatment), the patient (family) agrees to implement the following specific plan:   Reassured benign   CTM   RTC if keratosis on right chest changes in color, size, or symmetry      Seborrheic Keratosis  A seborrheic keratosis is a harmless warty spot that appears during adult life as a common sign of skin aging  Seborrheic keratoses can arise on any area of skin, covered or uncovered, with the usual exception of the palms and soles  They do not arise from mucous membranes  Seborrheic keratoses can have highly variable appearance  Seborrheic keratoses are extremely common  It has been estimated that over 90% of adults over the age of 61 years have one or more of them  They occur in males and females of all races, typically beginning to erupt in the 35s or 45s  They are uncommon under the age of 21 years  The precise cause of seborrhoeic keratoses is not known  Seborrhoeic keratoses are considered degenerative in nature  As time goes by, seborrheic keratoses tend to become more numerous  Some people inherit a tendency to develop a very large number of them; some people may have hundreds of them  The name "seborrheic keratosis" is misleading, because these lesions are not limited to a seborrhoeic distribution (scalp, mid-face, chest, upper back), nor are they formed from sebaceous glands, nor are they associated with sebum -- which is greasy  Seborrheic keratosis may also be called "SK," "Seb K," "basal cell papilloma," "senile wart," or "barnacle "      Researchers have noted:   Eruptive seborrhoeic keratoses can follow sunburn or dermatitis   Skin friction may be the reason they appear in body folds   Viral cause (e g , human papillomavirus) seems unlikely   Stable and clonal mutations or activation of FRFR3, PIK3CA, KALLI, AKT1 and EGFR genes are found in seborrhoeic keratoses   Seborrhoeic keratosis can arise from solar lentigo   FRFR3 mutations also arise in solar lentigines   These mutations are associated with increased age and location on the head and neck, suggesting a role of ultraviolet radiation in these lesions   Seborrheic keratoses do not harbour tumour suppressor gene mutations   Epidermal growth factor receptor inhibitors, which are used to treat some cancers, often result in an increase in verrucal (warty) keratoses  There is no easy way to remove multiple lesions on a single occasion  Unless a specific lesion is "inflamed" and is causing pain or stinging/burning or is bleeding, most insurance companies do not authorize treatment  IDIOPATHIC GUTTATE HYPOMELANOSIS    Physical Exam:   Anatomic Location Affected:  Bilaterall forearm   Size of Lesion:    Morphological Description:  Hypopigmented scattered macules   Pertinent Positives:   Pertinent Negatives: Additional History of Present Condition:  Present on exam    Assessment and Plan:  Based on a thorough discussion of this condition and the management approach to it (including a comprehensive discussion of the known risks, side effects and potential benefits of treatment), the patient (family) agrees to implement the following specific plan:  · Reassured benign     Assessment and Plan:  Idiopathic guttate hypomelanosis is the name given to 2 to 5-mm flat white spots found on the shins and forearms  "Idiopathic" means the cause is unknown, "guttate" means resembling tear-drops, and "hypomelanosis" refers to the lighter color of the affected areas  Idiopathic guttate hypomelanosis predominantly affects fair-skinned individuals, but it may occasionally arise in darker skin  Although most often found on the shins and sun-exposed parts of the forearms, guttate hypomelanosis may also arise on other sun-exposed areas including the face, neck and shoulders  The white marks are usually smooth with a reduction in the normal skin markings, but they may be slightly scaly  These lesions appear as part of the aging process, becoming quite common in those over 36years of age  They are more common in women than in men  Inherited factors may be relevant as the lesions appear to be more common in family members      The cause of guttate hypomelanosis is speculative  It is thought to be an inevitable part of the aging process, with a gradual reduction in melanocytes - a similar process to greying of hair  Other theories include sun damage (I e , the lesions are a kind of white freckle) and non-sun related seborrhoeic keratoses (I e , degenerative scaly spots)  Idiopathic guttate hypomelanosis does not appear to be due to trauma or viral infection  The white areas do not predispose to skin cancer  In most cases, treatment is not required as the marks are completely harmless  Attempts to destroy the lesions may leave brown marks or larger white marks, which may look worse than the original condition  Petey Dariel protection is very important; we recommend wearing UPF-rated sun protective clothing and sunglasses whenever possible and applying a moisturizer-sunscreen combination product with SPF 50+ such as Neutrogena Daily Defense at least 3 times a day to sun exposed areas of skin  SEBORRHEIC DERMATITIS    Physical Exam:   Anatomic Location Affected:  scalp   Morphological Description:  Scattered scale   Pertinent Positives:   Pertinent Negatives: Additional History of Present Condition:  Present on exam     Assessment and Plan:  Based on a thorough discussion of this condition and the management approach to it (including a comprehensive discussion of the known risks, side effects and potential benefits of treatment), the patient (family) agrees to implement the following specific plan:   Start Ketoconazole 2% shampoo  Daily for 2 weeks straight and then on "Mondays, Wednesdays and Fridays":  Lather into scalp and skin on face, neck, chest, and back; leave on for 5 minutes and then rinse off completely  Seborrheic Dermatitis   Seborrheic dermatitis is a common, chronic or relapsing form of eczema/dermatitis that mainly affects the sebaceous, gland-rich regions of the scalp, face, and trunk    There are infantile and adult forms of seborrhoeic dermatitis  It is sometimes associated with psoriasis and, in that clinical scenario, may be referred to as "sebo-psoriasis "  Seborrheic dermatitis is also known as "seborrheic eczema "  Dandruff (also called "pityriasis capitis") is an uninflamed form of seborrhoeic dermatitis  Dandruff presents as bran-like scaly patches scattered within hair-bearing areas of the scalp  In an infant, this condition may be referred to as "cradle cap "  The cause of seborrheic dermatitis is not completely understood  It is associated with proliferation of various species of the skin commensal Malassezia, in its yeast (non-pathogenic) form  Its metabolites (such as the fatty acids oleic acid, malssezin, and indole-3-carbaldehyde) may cause an inflammatory reaction  Differences in skin barrier lipid content and function may account for individual presentations  Infantile Seborrheic Dermatitis  Infantile seborrheic dermatitis affects babies under the age of 1 months and usually resolves by 1012 months of age  Infantile seborrheic dermatitis causes "cradle cap" (diffuse, greasy scaling on scalp)  The rash may spread to affect armpit and groin folds (a type of "napkin dermatitis")  There may be associated salmon-pink colored patches that may flake or peel  The rash in this case is usually not especially itchy, so the baby often appears undisturbed by the rash, even when more generalized  Adult Seborrheic Dermatitis  Adult seborrheic dermatitis tends to begin in late adolescence; prevalence is greatest in young adults and in the elderly  It is more common in males than in females      The following factors are sometimes associated with severe adult seborrheic dermatitis:   Oily skin   Familial tendency to seborrhoeic dermatitis or a family history of psoriasis   Immunosuppression: organ transplant recipient, human immunodeficiency virus (HIV) infection and patients with lymphoma   Neurological and psychiatric diseases: Parkinson disease, tardive dyskinesia, depression, epilepsy, facial nerve palsy, spinal cord injury and congenital disorders such as Down syndrome   Treatment for psoriasis with psoralen and ultraviolet A (PUVA) therapy   Lack of sleep   Stressful events  In adults, seborrheic dermatitis may typically affect the scalp, face (creases around the nose, behind ears, within eyebrows) and upper trunk  Typical clinical features include:   Winter flares, improving in summer following sun exposure   Minimal itch most of the time   Combination oily and dry mid-facial skin   Ill-defined localized scaly patches or diffuse scale in the scalp   Blepharitis; scaly red eyelid margins   [place]-pink, thin, scaly, and ill-defined plaques in skin folds on both sides of the face   Petal or ring-shaped flaky patches on hair-line and on anterior chest   Rash in armpits, under the breasts, in the groin folds and genital creases   Superficial folliculitis (inflamed hair follicles) on cheeks and upper trunk    Seborrheic dermatitis is diagnosed by its clinical appearance and behavior  Skin biopsy may be helpful but is rarely necessary to make this diagnosis

## 2021-08-31 NOTE — RESULT ENCOUNTER NOTE
DERMATOPATHOLOGY RESULT NOTE    Results reviewed by ordering physician  Called patient to personally discuss results  Discussed results with patient  Instructions for Clinical Derm Team:   (remember to route Result Note to appropriate staff):    None    Result & Plan by Specimen:    Specimen A: benign  Plan: reassured, benign      Specimen B: benign  Plan: reassured, benign      Specimen C: benign  Plan: reassured, benign        Tissue Exam: [id number]  Order: [id number]  Status:  Final result   Visible to patient:  Yes ([facility] MyChart) Next appt:  09/24/2021 at 09:00 AM in Family Medicine Kellie Pabon MD) Dx:  Neoplasm of uncertain behavior  0 Result Notes  Component   Case Report  Surgical Pathology Report                         Case: [id number]                                   Authorizing Provider: Derik Lazo MD            Collected:           08/25/2021 0922              Ordering Location:     [facility]    Received:            08/25/2021 2989                                     [place]                                                                    Pathologist:           Cristhian Kenney MD                                                           Specimens:   A) - Skin, Other, Specimen A: Back; Shave                                                           B) - Skin, Other, Specimen B: Left axilla; Shave                                                    C) - Skin, Other, Specimen C: Left inner thigh; Shave                                    Final Diagnosis  A  Skin, Back, shave biopsy:  Dermal melanocytic nevus     B  Skin, Left axilla; shave biopsy:  Acrochordon (fibroepithelial polyp)     C  Skin, Left inner thigh; shave biopsy:  Acrochordon (fibroepithelial polyp)  Electronically signed by Cristhian Kenney MD on 8/31/2021 at  2:34 PM  Microscopic Description   A  Nests of melanocytes without significant atypia or mitotic figures are present predominantly in the dermis  There is maturation of cytologic and architectural features with progressive depth within the dermis  Deeper levels were examined      B-C  There is a polypoid lesion of the epidermis and dermis, without significant atypia  Deeper levels were examined  Additional Information   All reported additional testing was performed with appropriately reactive controls   These tests were developed and their performance characteristics determined by [address] [place] [address] [facility] or appropriate performing facility, though some tests may be performed on tissues which have not been validated for performance characteristics (such as staining performed on alcohol exposed cell blocks and decalcified tissues)   Results should be interpreted with caution and in the context of the patients' clinical condition  These tests may not be cleared or approved by the [place]  Food and Drug Administration, though the FDA has determined that such clearance or approval is not necessary  These tests are used for clinical purposes and they should not be regarded as investigational or for research  This laboratory has been approved by CLIA 88, designated as a high-complexity laboratory and is qualified to perform these tests  Belkis Montalvo Description     A  The specimen is received in formalin, labeled with the patient's name and hospital number, and is designated "back, shave"  The specimen consists of a shave biopsy of tan-brown nodular skin measuring 0 3 x 0 2 x 0 2 cm  The apparent margin of resection is inked green  The specimen is entirely submitted in between sponges in 1 cassette          B  The specimen is received in formalin, labeled with the patient's name and hospital number, and is designated "left axilla, shave"  The specimen consists of a shave biopsy of tan-brown nodular skin measuring 0 3 x 0 3 x 0 1 cm  The apparent margin of resection is inked green  The specimen is entirely submitted in between sponges in 1 cassette          C  The specimen is received in formalin, labeled with the patient's name and hospital number, and is designated "left inner thigh, shave"  The specimen consists of a shave biopsy of tan-brown nodular skin measuring 0 4 x 0 4 x 0 1 cm  The apparent margin of resection is inked green  The specimen is bisected perpendicular to the margin and entirely submitted in between sponges in 1 cassette      Note: The estimated total formalin fixation time based upon information provided by the submitting clinician and the standard processing schedule is under 72 hours  [facility]    Clinical Information   Specimen A: Location: Back Skin; Shave biopsy;  44 y o  year old  Female with a Description:Brown pedunculated papule ; Differential diagnosis:Acrochrodon  versus nevus     Specimen B: Location: Left axilla Skin; Shave biopsy;  44 y o  year old  Female with a Description:Brown pedunculated papule ; Differential diagnosis:Acrochordon versus nevus     Specimen C: Location: Left inner thigh; Skin; Shave biopsy;  44 y o  year old  Female with a Description: Brown pedunculated papule; Differential diagnosis: Acrochordon versus Nevus     Attention: [facility]  Resulting Agency [facility]      Specimen Collected: 08/25/21  9:22 AM Last Resulted: 08/31/21  5:59 PM            Click "FILE TO CHART" button so that a copy of this Dermatopathology Result Note will be automatically placed in the patient's chart

## 2021-08-31 NOTE — TELEPHONE ENCOUNTER
Repeat bladder US completed in May is normal, resolved from prior ct  Can have ua micro and if normal no appt needed will f/u with pcp only   Await result

## 2021-08-31 NOTE — TELEPHONE ENCOUNTER
Patient called back and stated she was in [facility] and is now to be scheduled  She will go get urine culture tomorrow  Please advise on appropriate appointment

## 2021-09-01 ENCOUNTER — APPOINTMENT (OUTPATIENT)
Dept: LAB | Facility: HOSPITAL | Age: 51
End: 2021-09-01
Payer: COMMERCIAL

## 2021-09-01 DIAGNOSIS — E78.5 HYPERLIPIDEMIA, UNSPECIFIED HYPERLIPIDEMIA TYPE: ICD-10-CM

## 2021-09-01 LAB
ALBUMIN SERPL BCP-MCNC: 3.5 G/DL (ref 3.5–5)
ALP SERPL-CCNC: 45 U/L (ref 46–116)
ALT SERPL W P-5'-P-CCNC: 19 U/L (ref 12–78)
ANION GAP SERPL CALCULATED.3IONS-SCNC: 6 MMOL/L (ref 4–13)
AST SERPL W P-5'-P-CCNC: 17 U/L (ref 5–45)
BACTERIA UR QL AUTO: ABNORMAL /HPF
BILIRUB SERPL-MCNC: 0.51 MG/DL (ref 0.2–1)
BILIRUB UR QL STRIP: NEGATIVE
BUN SERPL-MCNC: 19 MG/DL (ref 5–25)
CALCIUM SERPL-MCNC: 8.7 MG/DL (ref 8.3–10.1)
CHLORIDE SERPL-SCNC: 106 MMOL/L (ref 100–108)
CHOLEST SERPL-MCNC: 242 MG/DL (ref 50–200)
CLARITY UR: ABNORMAL
CO2 SERPL-SCNC: 30 MMOL/L (ref 21–32)
COLOR UR: YELLOW
CREAT SERPL-MCNC: 0.78 MG/DL (ref 0.6–1.3)
GFR SERPL CREATININE-BSD FRML MDRD: 87 ML/MIN/1.73SQ M
GLUCOSE P FAST SERPL-MCNC: 90 MG/DL (ref 65–99)
GLUCOSE UR STRIP-MCNC: NEGATIVE MG/DL
HDLC SERPL-MCNC: 60 MG/DL
HGB UR QL STRIP.AUTO: NEGATIVE
KETONES UR STRIP-MCNC: NEGATIVE MG/DL
LDLC SERPL CALC-MCNC: 169 MG/DL (ref 0–100)
LEUKOCYTE ESTERASE UR QL STRIP: NEGATIVE
MUCOUS THREADS UR QL AUTO: ABNORMAL
NITRITE UR QL STRIP: NEGATIVE
NON-SQ EPI CELLS URNS QL MICRO: ABNORMAL /HPF
NONHDLC SERPL-MCNC: 182 MG/DL
PH UR STRIP.AUTO: 6.5 [PH]
POTASSIUM SERPL-SCNC: 4.1 MMOL/L (ref 3.5–5.3)
PROT SERPL-MCNC: 6.9 G/DL (ref 6.4–8.2)
PROT UR STRIP-MCNC: NEGATIVE MG/DL
RBC #/AREA URNS AUTO: ABNORMAL /HPF
SODIUM SERPL-SCNC: 142 MMOL/L (ref 136–145)
SP GR UR STRIP.AUTO: 1.01 (ref 1–1.03)
TRIGL SERPL-MCNC: 64 MG/DL
UROBILINOGEN UR QL STRIP.AUTO: 0.2 E.U./DL
WBC #/AREA URNS AUTO: ABNORMAL /HPF

## 2021-09-01 PROCEDURE — 81001 URINALYSIS AUTO W/SCOPE: CPT

## 2021-09-01 PROCEDURE — 36415 COLL VENOUS BLD VENIPUNCTURE: CPT

## 2021-09-01 PROCEDURE — 80061 LIPID PANEL: CPT

## 2021-09-01 PROCEDURE — 80053 COMPREHEN METABOLIC PANEL: CPT

## 2021-09-01 NOTE — TELEPHONE ENCOUNTER
Called and spoke with patient with assistance of  #[phone]  She is aware most recent bladder US was reviewed and results were normal  Patient had a urinalysis done this morning and we are currently awaiting results  Patient aware if U/A results are normal she does not need to follow up with Urology, only PCP  Patient verbalized understanding and states she has appointment with her PCP this month

## 2021-10-27 ENCOUNTER — OFFICE VISIT (OUTPATIENT)
Dept: FAMILY MEDICINE CLINIC | Facility: CLINIC | Age: 51
End: 2021-10-27

## 2021-10-27 VITALS
RESPIRATION RATE: 18 BRPM | HEIGHT: 63 IN | WEIGHT: 166.5 LBS | DIASTOLIC BLOOD PRESSURE: 70 MMHG | BODY MASS INDEX: 29.5 KG/M2 | OXYGEN SATURATION: 98 % | TEMPERATURE: 97 F | HEART RATE: 55 BPM | SYSTOLIC BLOOD PRESSURE: 128 MMHG

## 2021-10-27 DIAGNOSIS — Z11.59 NEED FOR HEPATITIS C SCREENING TEST: ICD-10-CM

## 2021-10-27 DIAGNOSIS — M79.671 RIGHT FOOT PAIN: ICD-10-CM

## 2021-10-27 DIAGNOSIS — E78.5 HYPERLIPIDEMIA, UNSPECIFIED HYPERLIPIDEMIA TYPE: ICD-10-CM

## 2021-10-27 DIAGNOSIS — M54.50 CHRONIC BILATERAL LOW BACK PAIN WITHOUT SCIATICA: Primary | ICD-10-CM

## 2021-10-27 DIAGNOSIS — G89.29 CHRONIC BILATERAL LOW BACK PAIN WITHOUT SCIATICA: Primary | ICD-10-CM

## 2021-10-27 PROCEDURE — 3074F SYST BP LT 130 MM HG: CPT | Performed by: FAMILY MEDICINE

## 2021-10-27 PROCEDURE — 99214 OFFICE O/P EST MOD 30 MIN: CPT | Performed by: FAMILY MEDICINE

## 2021-10-27 PROCEDURE — 3078F DIAST BP <80 MM HG: CPT | Performed by: FAMILY MEDICINE

## 2021-10-27 RX ORDER — SIMVASTATIN 20 MG
20 TABLET ORAL
Qty: 90 TABLET | Refills: 3 | Status: SHIPPED | OUTPATIENT
Start: 2021-10-27

## 2021-11-15 ENCOUNTER — EVALUATION (OUTPATIENT)
Dept: PHYSICAL THERAPY | Facility: CLINIC | Age: 51
End: 2021-11-15
Payer: COMMERCIAL

## 2021-11-15 VITALS — SYSTOLIC BLOOD PRESSURE: 154 MMHG | DIASTOLIC BLOOD PRESSURE: 104 MMHG

## 2021-11-15 DIAGNOSIS — G89.29 CHRONIC LEFT-SIDED THORACIC BACK PAIN: ICD-10-CM

## 2021-11-15 DIAGNOSIS — G89.29 CHRONIC BILATERAL LOW BACK PAIN WITHOUT SCIATICA: Primary | ICD-10-CM

## 2021-11-15 DIAGNOSIS — M54.6 CHRONIC LEFT-SIDED THORACIC BACK PAIN: ICD-10-CM

## 2021-11-15 DIAGNOSIS — M54.50 CHRONIC BILATERAL LOW BACK PAIN WITHOUT SCIATICA: Primary | ICD-10-CM

## 2021-11-15 PROCEDURE — 97112 NEUROMUSCULAR REEDUCATION: CPT | Performed by: PHYSICAL THERAPIST

## 2021-11-15 PROCEDURE — 97162 PT EVAL MOD COMPLEX 30 MIN: CPT | Performed by: PHYSICAL THERAPIST

## 2021-11-16 ENCOUNTER — OFFICE VISIT (OUTPATIENT)
Dept: PHYSICAL THERAPY | Facility: CLINIC | Age: 51
End: 2021-11-16
Payer: COMMERCIAL

## 2021-11-16 DIAGNOSIS — M54.6 CHRONIC LEFT-SIDED THORACIC BACK PAIN: ICD-10-CM

## 2021-11-16 DIAGNOSIS — M54.50 CHRONIC BILATERAL LOW BACK PAIN WITHOUT SCIATICA: Primary | ICD-10-CM

## 2021-11-16 DIAGNOSIS — G89.29 CHRONIC BILATERAL LOW BACK PAIN WITHOUT SCIATICA: Primary | ICD-10-CM

## 2021-11-16 DIAGNOSIS — G89.29 CHRONIC LEFT-SIDED THORACIC BACK PAIN: ICD-10-CM

## 2021-11-16 PROCEDURE — 97112 NEUROMUSCULAR REEDUCATION: CPT

## 2021-11-16 PROCEDURE — 97110 THERAPEUTIC EXERCISES: CPT

## 2021-11-23 ENCOUNTER — OFFICE VISIT (OUTPATIENT)
Dept: PHYSICAL THERAPY | Facility: CLINIC | Age: 51
End: 2021-11-23
Payer: COMMERCIAL

## 2021-11-23 DIAGNOSIS — M54.6 CHRONIC LEFT-SIDED THORACIC BACK PAIN: ICD-10-CM

## 2021-11-23 DIAGNOSIS — M54.50 CHRONIC BILATERAL LOW BACK PAIN WITHOUT SCIATICA: Primary | ICD-10-CM

## 2021-11-23 DIAGNOSIS — G89.29 CHRONIC LEFT-SIDED THORACIC BACK PAIN: ICD-10-CM

## 2021-11-23 DIAGNOSIS — G89.29 CHRONIC BILATERAL LOW BACK PAIN WITHOUT SCIATICA: Primary | ICD-10-CM

## 2021-11-23 PROCEDURE — 97112 NEUROMUSCULAR REEDUCATION: CPT

## 2021-11-23 PROCEDURE — 97110 THERAPEUTIC EXERCISES: CPT

## 2021-11-23 PROCEDURE — 97140 MANUAL THERAPY 1/> REGIONS: CPT

## 2021-11-29 ENCOUNTER — OFFICE VISIT (OUTPATIENT)
Dept: PHYSICAL THERAPY | Facility: CLINIC | Age: 51
End: 2021-11-29
Payer: COMMERCIAL

## 2021-11-29 DIAGNOSIS — G89.29 CHRONIC BILATERAL LOW BACK PAIN WITHOUT SCIATICA: Primary | ICD-10-CM

## 2021-11-29 DIAGNOSIS — M54.6 CHRONIC LEFT-SIDED THORACIC BACK PAIN: ICD-10-CM

## 2021-11-29 DIAGNOSIS — M54.50 CHRONIC BILATERAL LOW BACK PAIN WITHOUT SCIATICA: Primary | ICD-10-CM

## 2021-11-29 DIAGNOSIS — G89.29 CHRONIC LEFT-SIDED THORACIC BACK PAIN: ICD-10-CM

## 2021-11-29 PROCEDURE — 97140 MANUAL THERAPY 1/> REGIONS: CPT

## 2021-11-29 PROCEDURE — 97112 NEUROMUSCULAR REEDUCATION: CPT

## 2021-11-29 PROCEDURE — 97110 THERAPEUTIC EXERCISES: CPT

## 2021-11-30 ENCOUNTER — OFFICE VISIT (OUTPATIENT)
Dept: PHYSICAL THERAPY | Facility: CLINIC | Age: 51
End: 2021-11-30
Payer: COMMERCIAL

## 2021-11-30 DIAGNOSIS — G89.29 CHRONIC LEFT-SIDED THORACIC BACK PAIN: ICD-10-CM

## 2021-11-30 DIAGNOSIS — M54.50 CHRONIC BILATERAL LOW BACK PAIN WITHOUT SCIATICA: Primary | ICD-10-CM

## 2021-11-30 DIAGNOSIS — M54.6 CHRONIC LEFT-SIDED THORACIC BACK PAIN: ICD-10-CM

## 2021-11-30 DIAGNOSIS — G89.29 CHRONIC BILATERAL LOW BACK PAIN WITHOUT SCIATICA: Primary | ICD-10-CM

## 2021-11-30 PROCEDURE — 97110 THERAPEUTIC EXERCISES: CPT

## 2021-11-30 PROCEDURE — 97112 NEUROMUSCULAR REEDUCATION: CPT

## 2021-11-30 PROCEDURE — 97140 MANUAL THERAPY 1/> REGIONS: CPT

## 2021-12-02 ENCOUNTER — OFFICE VISIT (OUTPATIENT)
Dept: PHYSICAL THERAPY | Facility: CLINIC | Age: 51
End: 2021-12-02
Payer: COMMERCIAL

## 2021-12-02 DIAGNOSIS — M54.6 CHRONIC LEFT-SIDED THORACIC BACK PAIN: ICD-10-CM

## 2021-12-02 DIAGNOSIS — M54.50 CHRONIC BILATERAL LOW BACK PAIN WITHOUT SCIATICA: Primary | ICD-10-CM

## 2021-12-02 DIAGNOSIS — G89.29 CHRONIC BILATERAL LOW BACK PAIN WITHOUT SCIATICA: Primary | ICD-10-CM

## 2021-12-02 DIAGNOSIS — G89.29 CHRONIC LEFT-SIDED THORACIC BACK PAIN: ICD-10-CM

## 2021-12-02 PROCEDURE — 97112 NEUROMUSCULAR REEDUCATION: CPT | Performed by: PHYSICAL THERAPIST

## 2021-12-02 PROCEDURE — 97140 MANUAL THERAPY 1/> REGIONS: CPT | Performed by: PHYSICAL THERAPIST

## 2021-12-02 PROCEDURE — 97110 THERAPEUTIC EXERCISES: CPT | Performed by: PHYSICAL THERAPIST

## 2021-12-06 ENCOUNTER — TELEPHONE (OUTPATIENT)
Dept: OBGYN CLINIC | Facility: HOSPITAL | Age: 51
End: 2021-12-06

## 2021-12-06 ENCOUNTER — OFFICE VISIT (OUTPATIENT)
Dept: PHYSICAL THERAPY | Facility: CLINIC | Age: 51
End: 2021-12-06
Payer: COMMERCIAL

## 2021-12-06 DIAGNOSIS — M54.50 CHRONIC BILATERAL LOW BACK PAIN WITHOUT SCIATICA: Primary | ICD-10-CM

## 2021-12-06 DIAGNOSIS — M54.6 CHRONIC LEFT-SIDED THORACIC BACK PAIN: ICD-10-CM

## 2021-12-06 DIAGNOSIS — G89.29 CHRONIC BILATERAL LOW BACK PAIN WITHOUT SCIATICA: Primary | ICD-10-CM

## 2021-12-06 DIAGNOSIS — G89.29 CHRONIC LEFT-SIDED THORACIC BACK PAIN: ICD-10-CM

## 2021-12-06 PROCEDURE — 97140 MANUAL THERAPY 1/> REGIONS: CPT

## 2021-12-06 PROCEDURE — 97112 NEUROMUSCULAR REEDUCATION: CPT

## 2021-12-06 PROCEDURE — 97110 THERAPEUTIC EXERCISES: CPT

## 2021-12-09 ENCOUNTER — OFFICE VISIT (OUTPATIENT)
Dept: PHYSICAL THERAPY | Facility: CLINIC | Age: 51
End: 2021-12-09
Payer: COMMERCIAL

## 2021-12-09 DIAGNOSIS — G89.29 CHRONIC BILATERAL LOW BACK PAIN WITHOUT SCIATICA: Primary | ICD-10-CM

## 2021-12-09 DIAGNOSIS — G89.29 CHRONIC LEFT-SIDED THORACIC BACK PAIN: ICD-10-CM

## 2021-12-09 DIAGNOSIS — M54.6 CHRONIC LEFT-SIDED THORACIC BACK PAIN: ICD-10-CM

## 2021-12-09 DIAGNOSIS — M54.50 CHRONIC BILATERAL LOW BACK PAIN WITHOUT SCIATICA: Primary | ICD-10-CM

## 2021-12-09 PROCEDURE — 97110 THERAPEUTIC EXERCISES: CPT

## 2021-12-09 PROCEDURE — 97140 MANUAL THERAPY 1/> REGIONS: CPT

## 2021-12-09 PROCEDURE — 97112 NEUROMUSCULAR REEDUCATION: CPT

## 2022-01-27 ENCOUNTER — OFFICE VISIT (OUTPATIENT)
Dept: FAMILY MEDICINE CLINIC | Facility: CLINIC | Age: 52
End: 2022-01-27

## 2022-01-27 VITALS
HEART RATE: 70 BPM | HEIGHT: 63 IN | OXYGEN SATURATION: 99 % | BODY MASS INDEX: 29.02 KG/M2 | SYSTOLIC BLOOD PRESSURE: 142 MMHG | RESPIRATION RATE: 18 BRPM | DIASTOLIC BLOOD PRESSURE: 88 MMHG | WEIGHT: 163.8 LBS | TEMPERATURE: 97.2 F

## 2022-01-27 DIAGNOSIS — E78.5 HYPERLIPIDEMIA, UNSPECIFIED HYPERLIPIDEMIA TYPE: ICD-10-CM

## 2022-01-27 DIAGNOSIS — G89.29 CHRONIC BILATERAL LOW BACK PAIN WITHOUT SCIATICA: Primary | ICD-10-CM

## 2022-01-27 DIAGNOSIS — M54.50 CHRONIC BILATERAL LOW BACK PAIN WITHOUT SCIATICA: Primary | ICD-10-CM

## 2022-01-27 DIAGNOSIS — I10 HYPERTENSION, UNSPECIFIED TYPE: ICD-10-CM

## 2022-01-27 DIAGNOSIS — I10 ESSENTIAL HYPERTENSION: ICD-10-CM

## 2022-01-27 DIAGNOSIS — R00.2 PALPITATIONS: ICD-10-CM

## 2022-01-27 DIAGNOSIS — E55.9 MILD VITAMIN D DEFICIENCY: ICD-10-CM

## 2022-01-27 PROCEDURE — 3077F SYST BP >= 140 MM HG: CPT | Performed by: FAMILY MEDICINE

## 2022-01-27 PROCEDURE — 3079F DIAST BP 80-89 MM HG: CPT | Performed by: FAMILY MEDICINE

## 2022-01-27 PROCEDURE — 99214 OFFICE O/P EST MOD 30 MIN: CPT | Performed by: FAMILY MEDICINE

## 2022-01-27 RX ORDER — LISINOPRIL 10 MG/1
10 TABLET ORAL DAILY
Qty: 90 TABLET | Refills: 3 | Status: SHIPPED | OUTPATIENT
Start: 2022-01-27

## 2022-01-27 NOTE — PROGRESS NOTES
Assessment/Plan:    Chronic bilateral low back pain without sciatica  Given no improvement of symptoms despite 2 months of therapy with Meloxicam as well as 6 week course of physical therapy combined with home exercises and local moist heat, MRI ordered        Diagnoses and all orders for this visit:    Chronic bilateral low back pain without sciatica  -     MRI lumbar spine wo contrast; Future    Hyperlipidemia, unspecified hyperlipidemia type  -     CBC and differential; Future  -     Comprehensive metabolic panel; Future  -     Lipid panel; Future  -     Vitamin D 25 hydroxy; Future  -     Microalbumin / creatinine urine ratio; Future  -     TSH, 3rd generation with Free T4 reflex; Future    Mild vitamin D deficiency  -     Vitamin D 25 hydroxy; Future    Essential hypertension  -     CBC and differential; Future  -     Comprehensive metabolic panel; Future  -     Lipid panel; Future  -     Vitamin D 25 hydroxy; Future  -     Microalbumin / creatinine urine ratio; Future  -     TSH, 3rd generation with Free T4 reflex; Future    Palpitations  -     lisinopril (ZESTRIL) 10 mg tablet; Take 1 tablet (10 mg total) by mouth daily    Hypertension, unspecified type  -     lisinopril (ZESTRIL) 10 mg tablet; Take 1 tablet (10 mg total) by mouth daily          Subjective:      Patient ID: Uzma Roland is a 46 y o  female  47 yo  female with chronic LBP, has been taking Meloxicam for about 2 months and took Ibuprofen 600 mg in the past prior to the Meloxicam, completed 6 week course of physical therapy and has been using moist heat and doing home exercises as well, however she states back pain has not improved  Back pain is constant, not radiated, described as dull with sharp pain when she bends or twists  Denies red flags  Pain is interfering with ADL as she is unable to carry grocery bags, bend down to clean/ things, has increased pain when standing in the kitchen cooking or doing the dishes  The following portions of the patient's history were reviewed and updated as appropriate:   She  has a past medical history of Hyperlipidemia, Hypertension, JOSUÉ (obstructive sleep apnea) (2020), and Sinus pressure  She   Patient Active Problem List    Diagnosis Date Noted    Chronic bilateral low back pain without sciatica 10/27/2021    Other microscopic hematuria 2021    Enlarged uterus 2021    JOSUÉ (obstructive sleep apnea) 2020    Foot pain 2020    Snoring 2020    Pain in both feet 2020    Class 1 obesity due to excess calories with serious comorbidity and body mass index (BMI) of 32 0 to 32 9 in adult 2019    H  pylori infection 2019    Low ferritin 2019    Slow transit constipation 2019    Fatigue 10/08/2018    History of colonoscopy 10/08/2018    Overactive bladder 10/04/2018    Anxiety 2017    Chronic neck pain 2017    Chronic upper back pain 2017    Early satiety 2016    First degree hemorrhoids 2016    Constipation 10/14/2016    Epigastric pain 10/14/2016    Nocturnal leg cramps 2016    Palpitations 2016    Mild vitamin D deficiency 2016    Ovarian cyst, complex 2015    Headache 2015    Hyperlipidemia 2015    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    Osteoarthritis 2014    Essential hypertension 10/12/2012    Herpes simplex infection 2012     She  has a past surgical history that includes  section, low transverse; Laparoscopic total hysterectomy (2017); Larynx surgery; pr esophagogastroduodenoscopy transoral diagnostic (N/A, 3/26/2019); pr colonoscopy flx dx w/collj spec when pfrmd (N/A, 3/26/2019); and Tubal ligation  Her family history includes Hypertension in her mother  She  reports that she has never smoked   She has never used smokeless tobacco  She reports that she does not drink alcohol and does not use drugs  Current Outpatient Medications   Medication Sig Dispense Refill    ketoconazole (NIZORAL) 2 % shampoo Daily for 2 weeks straight and then on "Mondays, Wednesdays and Fridays":  Lather into scalp and skin on face, neck, chest, and back; leave on for 5 minutes and then rinse off completely  120 mL 1    lisinopril (ZESTRIL) 10 mg tablet Take 1 tablet (10 mg total) by mouth daily 90 tablet 3    Magnesium 100 MG TABS Take by mouth      meloxicam (MOBIC) 7 5 mg tablet Take 1 tablet (7 5 mg total) by mouth daily (Patient not taking: Reported on 12/1/2020) 30 tablet 5    phentermine (ADIPEX-P) 37 5 MG tablet Take 1 tablet (37 5 mg total) by mouth daily (Patient not taking: Reported on 10/5/2020) 30 tablet 2    Probiotic Product (PROBIOTIC DAILY PO) Take by mouth daily      RA VITAMIN D-3 125 MCG (5000 UT) capsule Take 1 capsule (5,000 Units total) by mouth daily 90 capsule 1    simvastatin (ZOCOR) 20 mg tablet Take 1 tablet (20 mg total) by mouth daily at bedtime 90 tablet 3    valACYclovir (VALTREX) 1,000 mg tablet Take 1 tablet (1,000 mg total) by mouth daily (Patient not taking: Reported on 10/5/2020) 30 tablet 0     No current facility-administered medications for this visit  Current Outpatient Medications on File Prior to Visit   Medication Sig    ketoconazole (NIZORAL) 2 % shampoo Daily for 2 weeks straight and then on "Mondays, Wednesdays and Fridays":  Lather into scalp and skin on face, neck, chest, and back; leave on for 5 minutes and then rinse off completely      Magnesium 100 MG TABS Take by mouth    meloxicam (MOBIC) 7 5 mg tablet Take 1 tablet (7 5 mg total) by mouth daily (Patient not taking: Reported on 12/1/2020)    phentermine (ADIPEX-P) 37 5 MG tablet Take 1 tablet (37 5 mg total) by mouth daily (Patient not taking: Reported on 10/5/2020)    Probiotic Product (PROBIOTIC DAILY PO) Take by mouth daily    RA VITAMIN D-3 125 MCG (5000 UT) capsule Take 1 capsule (5,000 Units total) by mouth daily    simvastatin (ZOCOR) 20 mg tablet Take 1 tablet (20 mg total) by mouth daily at bedtime    valACYclovir (VALTREX) 1,000 mg tablet Take 1 tablet (1,000 mg total) by mouth daily (Patient not taking: Reported on 10/5/2020)    [DISCONTINUED] lisinopril (ZESTRIL) 10 mg tablet Take 1 tablet (10 mg total) by mouth daily (Patient not taking: Reported on 10/27/2021)     No current facility-administered medications on file prior to visit       Review of Systems   All other systems reviewed and are negative  Objective:      /88 (BP Location: Left arm, Patient Position: Sitting, Cuff Size: Adult)   Pulse 70   Temp (!) 97 2 °F (36 2 °C) (Temporal)   Resp 18   Ht 5' 2 5" (1 588 m)   Wt 74 3 kg (163 lb 12 8 oz)   LMP 07/02/2016   SpO2 99%   BMI 29 48 kg/m²          Physical Exam  Vitals and nursing note reviewed  Constitutional:       Appearance: She is well-developed  HENT:      Head: Normocephalic  Right Ear: External ear normal       Left Ear: External ear normal       Nose: Nose normal    Eyes:      Conjunctiva/sclera: Conjunctivae normal       Pupils: Pupils are equal, round, and reactive to light  Neck:      Thyroid: No thyromegaly  Cardiovascular:      Rate and Rhythm: Normal rate and regular rhythm  Heart sounds: Normal heart sounds  Pulmonary:      Effort: Pulmonary effort is normal       Breath sounds: Normal breath sounds  Abdominal:      Palpations: Abdomen is soft  Tenderness: There is no abdominal tenderness  There is no guarding or rebound  Musculoskeletal:         General: Tenderness present  Cervical back: Normal, normal range of motion and neck supple  Thoracic back: Tenderness present  Lumbar back: Spasms and tenderness present  Positive right straight leg raise test  Negative left straight leg raise test    Skin:     General: Skin is dry     Neurological:      Mental Status: She is alert and oriented to person, place, and time  Deep Tendon Reflexes: Reflexes are normal and symmetric

## 2022-01-27 NOTE — ASSESSMENT & PLAN NOTE
Given no improvement of symptoms despite 2 months of therapy with Meloxicam as well as 6 week course of physical therapy combined with home exercises and local moist heat, MRI ordered

## 2022-02-19 ENCOUNTER — HOSPITAL ENCOUNTER (OUTPATIENT)
Dept: MRI IMAGING | Facility: HOSPITAL | Age: 52
Discharge: HOME | End: 2022-02-19
Payer: COMMERCIAL

## 2022-02-19 DIAGNOSIS — G89.29 CHRONIC BILATERAL LOW BACK PAIN WITHOUT SCIATICA: ICD-10-CM

## 2022-02-19 DIAGNOSIS — M54.50 CHRONIC BILATERAL LOW BACK PAIN WITHOUT SCIATICA: ICD-10-CM

## 2022-02-19 PROCEDURE — G1004 CDSM NDSC: HCPCS

## 2022-02-19 PROCEDURE — 72148 MRI LUMBAR SPINE W/O DYE: CPT

## 2022-04-25 ENCOUNTER — APPOINTMENT (OUTPATIENT)
Dept: LAB | Facility: CLINIC | Age: 52
End: 2022-04-25
Payer: COMMERCIAL

## 2022-04-25 DIAGNOSIS — I10 ESSENTIAL HYPERTENSION: ICD-10-CM

## 2022-04-25 DIAGNOSIS — E78.5 HYPERLIPIDEMIA, UNSPECIFIED HYPERLIPIDEMIA TYPE: ICD-10-CM

## 2022-04-25 DIAGNOSIS — E55.9 MILD VITAMIN D DEFICIENCY: ICD-10-CM

## 2022-04-25 DIAGNOSIS — Z11.59 NEED FOR HEPATITIS C SCREENING TEST: ICD-10-CM

## 2022-04-25 LAB
25(OH)D3 SERPL-MCNC: 49.4 NG/ML (ref 30–100)
ALBUMIN SERPL BCP-MCNC: 3.6 G/DL (ref 3.5–5)
ALP SERPL-CCNC: 49 U/L (ref 46–116)
ALT SERPL W P-5'-P-CCNC: 34 U/L (ref 12–78)
ANION GAP SERPL CALCULATED.3IONS-SCNC: 2 MMOL/L (ref 4–13)
AST SERPL W P-5'-P-CCNC: 16 U/L (ref 5–45)
BASOPHILS # BLD AUTO: 0.04 THOUSANDS/ΜL (ref 0–0.1)
BASOPHILS NFR BLD AUTO: 1 % (ref 0–1)
BILIRUB SERPL-MCNC: 0.5 MG/DL (ref 0.2–1)
BUN SERPL-MCNC: 22 MG/DL (ref 5–25)
CALCIUM SERPL-MCNC: 9.5 MG/DL (ref 8.3–10.1)
CHLORIDE SERPL-SCNC: 109 MMOL/L (ref 100–108)
CHOLEST SERPL-MCNC: 238 MG/DL
CO2 SERPL-SCNC: 30 MMOL/L (ref 21–32)
CREAT SERPL-MCNC: 0.87 MG/DL (ref 0.6–1.3)
CREAT UR-MCNC: 87.6 MG/DL
EOSINOPHIL # BLD AUTO: 0.19 THOUSAND/ΜL (ref 0–0.61)
EOSINOPHIL NFR BLD AUTO: 4 % (ref 0–6)
ERYTHROCYTE [DISTWIDTH] IN BLOOD BY AUTOMATED COUNT: 12.5 % (ref 11.6–15.1)
GFR SERPL CREATININE-BSD FRML MDRD: 75 ML/MIN/1.73SQ M
GLUCOSE P FAST SERPL-MCNC: 77 MG/DL (ref 65–99)
HCT VFR BLD AUTO: 42.8 % (ref 34.8–46.1)
HCV AB SER QL: NORMAL
HDLC SERPL-MCNC: 73 MG/DL
HGB BLD-MCNC: 13.3 G/DL (ref 11.5–15.4)
IMM GRANULOCYTES # BLD AUTO: 0.01 THOUSAND/UL (ref 0–0.2)
IMM GRANULOCYTES NFR BLD AUTO: 0 % (ref 0–2)
LDLC SERPL CALC-MCNC: 156 MG/DL (ref 0–100)
LYMPHOCYTES # BLD AUTO: 1.58 THOUSANDS/ΜL (ref 0.6–4.47)
LYMPHOCYTES NFR BLD AUTO: 37 % (ref 14–44)
MCH RBC QN AUTO: 30.2 PG (ref 26.8–34.3)
MCHC RBC AUTO-ENTMCNC: 31.1 G/DL (ref 31.4–37.4)
MCV RBC AUTO: 97 FL (ref 82–98)
MICROALBUMIN UR-MCNC: 5.5 MG/L (ref 0–20)
MICROALBUMIN/CREAT 24H UR: 6 MG/G CREATININE (ref 0–30)
MONOCYTES # BLD AUTO: 0.42 THOUSAND/ΜL (ref 0.17–1.22)
MONOCYTES NFR BLD AUTO: 10 % (ref 4–12)
NEUTROPHILS # BLD AUTO: 2.07 THOUSANDS/ΜL (ref 1.85–7.62)
NEUTS SEG NFR BLD AUTO: 48 % (ref 43–75)
NONHDLC SERPL-MCNC: 165 MG/DL
NRBC BLD AUTO-RTO: 0 /100 WBCS
PLATELET # BLD AUTO: 165 THOUSANDS/UL (ref 149–390)
PMV BLD AUTO: 13.4 FL (ref 8.9–12.7)
POTASSIUM SERPL-SCNC: 4.1 MMOL/L (ref 3.5–5.3)
PROT SERPL-MCNC: 7.1 G/DL (ref 6.4–8.2)
RBC # BLD AUTO: 4.41 MILLION/UL (ref 3.81–5.12)
SODIUM SERPL-SCNC: 141 MMOL/L (ref 136–145)
TRIGL SERPL-MCNC: 45 MG/DL
TSH SERPL DL<=0.05 MIU/L-ACNC: 2.4 UIU/ML (ref 0.45–4.5)
WBC # BLD AUTO: 4.31 THOUSAND/UL (ref 4.31–10.16)

## 2022-04-25 PROCEDURE — 85025 COMPLETE CBC W/AUTO DIFF WBC: CPT

## 2022-04-25 PROCEDURE — 84443 ASSAY THYROID STIM HORMONE: CPT

## 2022-04-25 PROCEDURE — 82306 VITAMIN D 25 HYDROXY: CPT

## 2022-04-25 PROCEDURE — 80053 COMPREHEN METABOLIC PANEL: CPT

## 2022-04-25 PROCEDURE — 80061 LIPID PANEL: CPT

## 2022-04-25 PROCEDURE — 86803 HEPATITIS C AB TEST: CPT

## 2022-04-25 PROCEDURE — 82570 ASSAY OF URINE CREATININE: CPT

## 2022-04-25 PROCEDURE — 82043 UR ALBUMIN QUANTITATIVE: CPT

## 2022-04-25 PROCEDURE — 36415 COLL VENOUS BLD VENIPUNCTURE: CPT

## 2022-04-27 ENCOUNTER — OFFICE VISIT (OUTPATIENT)
Dept: FAMILY MEDICINE CLINIC | Facility: CLINIC | Age: 52
End: 2022-04-27

## 2022-04-27 VITALS
HEIGHT: 63 IN | DIASTOLIC BLOOD PRESSURE: 92 MMHG | OXYGEN SATURATION: 98 % | TEMPERATURE: 97.6 F | RESPIRATION RATE: 19 BRPM | HEART RATE: 55 BPM | BODY MASS INDEX: 30.48 KG/M2 | SYSTOLIC BLOOD PRESSURE: 140 MMHG | WEIGHT: 172 LBS

## 2022-04-27 DIAGNOSIS — E55.9 MILD VITAMIN D DEFICIENCY: ICD-10-CM

## 2022-04-27 DIAGNOSIS — I10 ESSENTIAL HYPERTENSION: ICD-10-CM

## 2022-04-27 DIAGNOSIS — M54.50 CHRONIC BILATERAL LOW BACK PAIN WITHOUT SCIATICA: Primary | ICD-10-CM

## 2022-04-27 DIAGNOSIS — G89.29 CHRONIC BILATERAL LOW BACK PAIN WITHOUT SCIATICA: Primary | ICD-10-CM

## 2022-04-27 PROCEDURE — 99214 OFFICE O/P EST MOD 30 MIN: CPT | Performed by: FAMILY MEDICINE

## 2022-04-27 PROCEDURE — 3080F DIAST BP >= 90 MM HG: CPT | Performed by: FAMILY MEDICINE

## 2022-04-27 PROCEDURE — 3077F SYST BP >= 140 MM HG: CPT | Performed by: FAMILY MEDICINE

## 2022-04-27 RX ORDER — CHOLECALCIFEROL (VITAMIN D3) 125 MCG
5000 CAPSULE ORAL DAILY
Qty: 90 CAPSULE | Refills: 1 | Status: SHIPPED | OUTPATIENT
Start: 2022-04-27

## 2022-04-27 NOTE — PROGRESS NOTES
Assessment/Plan:    No problem-specific Assessment & Plan notes found for this encounter  Diagnoses and all orders for this visit:    Chronic bilateral low back pain without sciatica  -     Ambulatory Referral to Physical Therapy; Future    Mild vitamin D deficiency  -     RA Vitamin D-3 125 MCG (5000 UT) capsule; Take 1 capsule (5,000 Units total) by mouth daily    Essential hypertension        MRI 2/19/2022 reviewed and discussed with patient  Referred to PT again as per patient's request  Patient encouraged to stay active  Labs from 4/25/2022 reviewed and discussed with patient  FLP not at goal: total cholesterol slightly decreased at 238 from 242   down from 169  Patient states she does not take her statin regularly  Encouraged patient to take it daily as prescribed and continue low fat diet   Subjective:      Patient ID: Jenn Martinez is a 45 y o  female  46 yo  female with chronic LBP with no red flags, here today requesting to discuss results of LS MRI  Patient did receive results over the phone but would like to discuss in person  LBP slightly improved from prior  5/10 at its worse  No new symptoms or complains  The following portions of the patient's history were reviewed and updated as appropriate:   She  has a past medical history of Hyperlipidemia, Hypertension, JOSUÉ (obstructive sleep apnea) (7/16/2020), and Sinus pressure    She   Patient Active Problem List    Diagnosis Date Noted    Chronic bilateral low back pain without sciatica 10/27/2021    Other microscopic hematuria 05/17/2021    Enlarged uterus 01/11/2021    JOSUÉ (obstructive sleep apnea) 07/16/2020    Foot pain 02/13/2020    Snoring 01/14/2020    Pain in both feet 01/14/2020    Class 1 obesity due to excess calories with serious comorbidity and body mass index (BMI) of 32 0 to 32 9 in adult 11/17/2019    H  pylori infection 06/17/2019    Low ferritin 01/08/2019    Slow transit constipation 2019    Fatigue 10/08/2018    History of colonoscopy 10/08/2018    Overactive bladder 10/04/2018    Anxiety 2017    Chronic neck pain 2017    Chronic upper back pain 2017    Early satiety 2016    First degree hemorrhoids 2016    Constipation 10/14/2016    Epigastric pain 10/14/2016    Nocturnal leg cramps 2016    Palpitations 2016    Mild vitamin D deficiency 2016    Ovarian cyst, complex 2015    Headache 2015    Hyperlipidemia 2015    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    Osteoarthritis 2014    Essential hypertension 10/12/2012    Herpes simplex infection 2012     She  has a past surgical history that includes  section, low transverse; Laparoscopic total hysterectomy (2017); Larynx surgery; pr esophagogastroduodenoscopy transoral diagnostic (N/A, 3/26/2019); pr colonoscopy flx dx w/collj spec when pfrmd (N/A, 3/26/2019); and Tubal ligation  Her family history includes Hypertension in her mother  She  reports that she has never smoked  She has never used smokeless tobacco  She reports that she does not drink alcohol and does not use drugs  Current Outpatient Medications   Medication Sig Dispense Refill    ketoconazole (NIZORAL) 2 % shampoo Daily for 2 weeks straight and then on ",  and ":  Lather into scalp and skin on face, neck, chest, and back; leave on for 5 minutes and then rinse off completely   120 mL 1    lisinopril (ZESTRIL) 10 mg tablet Take 1 tablet (10 mg total) by mouth daily 90 tablet 3    Magnesium 100 MG TABS Take by mouth      meloxicam (MOBIC) 7 5 mg tablet Take 1 tablet (7 5 mg total) by mouth daily (Patient not taking: Reported on 2020) 30 tablet 5    phentermine (ADIPEX-P) 37 5 MG tablet Take 1 tablet (37 5 mg total) by mouth daily (Patient not taking: Reported on 10/5/2020) 30 tablet 2    Probiotic Product (PROBIOTIC DAILY PO) Take by mouth daily      RA Vitamin D-3 125 MCG (5000 UT) capsule Take 1 capsule (5,000 Units total) by mouth daily 90 capsule 1    simvastatin (ZOCOR) 20 mg tablet Take 1 tablet (20 mg total) by mouth daily at bedtime 90 tablet 3    valACYclovir (VALTREX) 1,000 mg tablet Take 1 tablet (1,000 mg total) by mouth daily (Patient not taking: Reported on 10/5/2020) 30 tablet 0     No current facility-administered medications for this visit  Current Outpatient Medications on File Prior to Visit   Medication Sig    ketoconazole (NIZORAL) 2 % shampoo Daily for 2 weeks straight and then on "Mondays, Wednesdays and Fridays":  Lather into scalp and skin on face, neck, chest, and back; leave on for 5 minutes and then rinse off completely   lisinopril (ZESTRIL) 10 mg tablet Take 1 tablet (10 mg total) by mouth daily    Magnesium 100 MG TABS Take by mouth    meloxicam (MOBIC) 7 5 mg tablet Take 1 tablet (7 5 mg total) by mouth daily (Patient not taking: Reported on 12/1/2020)    phentermine (ADIPEX-P) 37 5 MG tablet Take 1 tablet (37 5 mg total) by mouth daily (Patient not taking: Reported on 10/5/2020)    Probiotic Product (PROBIOTIC DAILY PO) Take by mouth daily    simvastatin (ZOCOR) 20 mg tablet Take 1 tablet (20 mg total) by mouth daily at bedtime    valACYclovir (VALTREX) 1,000 mg tablet Take 1 tablet (1,000 mg total) by mouth daily (Patient not taking: Reported on 10/5/2020)    [DISCONTINUED] RA VITAMIN D-3 125 MCG (5000 UT) capsule Take 1 capsule (5,000 Units total) by mouth daily     No current facility-administered medications on file prior to visit       Review of Systems   Musculoskeletal: Positive for arthralgias  All other systems reviewed and are negative          Objective:      /92 (BP Location: Left arm, Patient Position: Sitting, Cuff Size: Adult)   Pulse 55   Temp 97 6 °F (36 4 °C) (Temporal)   Resp 19   Ht 5' 2 5" (1 588 m)   Wt 78 kg (172 lb)   LMP 07/02/2016   SpO2 98% BMI 30 96 kg/m²          Physical Exam  Vitals and nursing note reviewed  Constitutional:       Appearance: She is well-developed  HENT:      Head: Normocephalic  Right Ear: External ear normal       Left Ear: External ear normal       Nose: Nose normal    Eyes:      Conjunctiva/sclera: Conjunctivae normal       Pupils: Pupils are equal, round, and reactive to light  Neck:      Thyroid: No thyromegaly  Cardiovascular:      Rate and Rhythm: Normal rate and regular rhythm  Heart sounds: Normal heart sounds  Pulmonary:      Effort: Pulmonary effort is normal       Breath sounds: Normal breath sounds  Abdominal:      Palpations: Abdomen is soft  Tenderness: There is no abdominal tenderness  There is no guarding or rebound  Musculoskeletal:         General: Normal range of motion  Cervical back: Normal range of motion and neck supple  Skin:     General: Skin is dry  Neurological:      Mental Status: She is alert and oriented to person, place, and time  Deep Tendon Reflexes: Reflexes are normal and symmetric

## 2022-04-29 ENCOUNTER — EVALUATION (OUTPATIENT)
Dept: PHYSICAL THERAPY | Facility: CLINIC | Age: 52
End: 2022-04-29
Payer: COMMERCIAL

## 2022-04-29 DIAGNOSIS — M54.50 CHRONIC BILATERAL LOW BACK PAIN WITHOUT SCIATICA: Primary | ICD-10-CM

## 2022-04-29 DIAGNOSIS — G89.29 CHRONIC LEFT-SIDED THORACIC BACK PAIN: ICD-10-CM

## 2022-04-29 DIAGNOSIS — G89.29 CHRONIC BILATERAL LOW BACK PAIN WITHOUT SCIATICA: Primary | ICD-10-CM

## 2022-04-29 DIAGNOSIS — M54.6 CHRONIC LEFT-SIDED THORACIC BACK PAIN: ICD-10-CM

## 2022-04-29 PROCEDURE — 97162 PT EVAL MOD COMPLEX 30 MIN: CPT

## 2022-04-29 PROCEDURE — 97530 THERAPEUTIC ACTIVITIES: CPT

## 2022-04-29 NOTE — PROGRESS NOTES
PT Evaluation     Today's date: 2022  Patient name: Shawna Aldridge  : 1970  MRN: [id number]  Referring provider: Idris Castillo MD  Dx:   Encounter Diagnosis     ICD-10-CM    1  Chronic bilateral low back pain without sciatica  M54 50 Ambulatory Referral to Physical Therapy    G89 29    2  Chronic left-sided thoracic back pain  M54 6     G89 29                   Assessment  Assessment details: Shawna Aldridge is a 45 y o  female who presents today to outpatient therapy for evaluation of LBP  Upon assessment today, pt exhibits postural deviations; decreased/painful thoracolumbar AROM; decreased periscapular strength; and TTP thru the (L) rhomboids & thoracic paraspinals  These impairments are contributing to functional limitations with working; lifting/carrying; T/S hypomobility; using the (L) LE while completing ADLs; and sleeping comfortably at night  Pt would therefore benefit from PT intervention in order to address the aforementioned deficits so that she can return to her PLOF and function comfortably/safely in her home and surrounding environment  Thank you for the referral! - Jose Ponce, PT, DPT  Impairments: abnormal or restricted ROM, abnormal movement, impaired balance, impaired physical strength, pain with function and poor posture   Understanding of Dx/Px/POC: good   Prognosis: good    Goals  STG 1: Pt will demonstrate compliance w/ HEP to supplement therapy in 1-2 weeks  STG 2: Pt will report 25% reduction in pain in 2-4 weeks  LTG 1: Pt will be able to use the (L) UE while cleaning daily with min increased symptoms in 6-8 weeks  LTG 2: Pt will be able to lift/carry heavy objects with minimal difficulty related to the back in 6-8 weeks  LTG 3: Pt will be able to complete work duties with minimal difficulty related to the back in 6-8 weeks  LTG 4: Pt will be able to sleep comfortably at night without difficulty related to the back in 6-8 weeks      Plan  Plan details: Educated pt today about PT goals; prognosis; diagnosis; normal age-related changes of the spine; MRI findings and correlation (and lack thereof) with pain; role of periscapular strength; OA vs RA; and role of periscapular strength  Patient would benefit from: skilled physical therapy  Planned modality interventions: cryotherapy, TENS, traction and unattended electrical stimulation  Planned therapy interventions: abdominal trunk stabilization, self care, postural training, patient education, neuromuscular re-education, joint mobilization, manual therapy, massage, activity modification, balance, body mechanics training, breathing training, [place] taping, strengthening, stretching, therapeutic activities, coordination, flexibility, home exercise program, therapeutic exercise and functional ROM exercises  Frequency: 2x week  Duration in weeks: 4  Treatment plan discussed with: patient        Subjective Evaluation    History of Present Illness  Mechanism of injury: Pt reports onset of LBP which began a couple of years ago insidiously  She attributes this 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  She previously participated in physical therapy which helped when she was doing it however when she stopped, the pain returned  Currently, pt reports difficulty lifting/carrying heavy objects; working; and sleeping comfortably at night secondary to her back pain  Eases: Massage, hot pack, pain cream    Pain  Current pain ratin  At best pain ratin  At worst pain rating: 10  Location: Pt reports pain thru the (L) middle back which sometimes wraps around the rib  Objective     Palpation     Additional Palpation Details  Mod-severe TTP thru (L) rhomboids, T/S paraspinals  LTG: Min TTP      Active Range of Motion   Cervical/Thoracic Spine       Thoracic    Flexion:  WFL  Extension:  with pain Restriction level: minimal  Left lateral flexion:  with pain Restriction level: minimal  Right lateral flexion:  with pain Restriction level: minimal  Left rotation:  Restriction level: minimal  Right rotation:  with pain Restriction level: minimal    Lumbar   Flexion:  WFL  Extension:  with pain Restriction level: minimal  Left lateral flexion:  WFL  Right lateral flexion:  WFL and with pain  Left rotation:  WFL and with pain  Right rotation:  WFL and with pain    Additional Active Range of Motion Details  Shoulder AROM WNL and pain-free  Joint Play   Joints within functional limits: L1, L2, L3, L4 and L5     Hypomobile: T2, T3, T4, T5, T6, T7, T8, T9, T10, T11 and T12     Pain: L1, L2, L3, L4 and L5     Strength/Myotome Testing     Left Shoulder     Planes of Motion   Flexion: 5   Abduction: 4+   External rotation at 0°: 5   Internal rotation at 0°: 5     Isolated Muscles   Lower trapezius: 4   Middle trapezius: 4   Rhomboids: 4-     Right Shoulder     Planes of Motion   Flexion: 5   Abduction: 5   External rotation at 0°: 5   Internal rotation at 0°: 5     Isolated Muscles   Lower trapezius: 4+   Middle trapezius: 4+   Rhomboids: 4+     Left Hip   Planes of Motion   Flexion: 4+    Right Hip   Planes of Motion   Flexion: 5    Left Knee   Flexion: 5  Extension: 5    Right Knee   Flexion: 5  Extension: 5      Flowsheet Rows      Most Recent Value   PT/OT G-Codes    Current Score 44   Projected Score 59             Precautions: Ovarian cyst; sleep apnea; HTN; OA; anxiety; hyperlipidemia; chronic LBP     Date 4/29            FOTO IE            Re-Eval IE                Manuals 4/29            STM (L) rhomboids, T/S PS             T/S PA's                                       Neuro Re-Ed 4/29            TVA Contractions nv            Toni Christy Dogs             Mod Front [facility]              [employer]             No Moneys nv            Tband Scissors             Wall ABCs nv            FRO nv            Spidermans             UE PNF                          Ther Ex 4/29            Jolynn Younger Taz              Open Books nv            SL Hip Abd             Prone Y, T, I's             Supine Pec (S) 1/2 Foam             Toy Soldiers 1/2 Foam             Seated T/S Ext over ball nv            Seated QL (S)             DL Leg Press             Tband IR/ER             Seated Rhomboid (S) nv            Doorway Pec (S)             Wall [place] nv                         Ther Activity 4/29            Retro UBE nv            Sidesteps                                                     Pt Edu 10' AL                         Gait Training 4/29                                      Modalities 4/29            Prn

## 2022-05-03 ENCOUNTER — OFFICE VISIT (OUTPATIENT)
Dept: PHYSICAL THERAPY | Facility: CLINIC | Age: 52
End: 2022-05-03
Payer: COMMERCIAL

## 2022-05-03 DIAGNOSIS — G89.29 CHRONIC BILATERAL LOW BACK PAIN WITHOUT SCIATICA: Primary | ICD-10-CM

## 2022-05-03 DIAGNOSIS — M54.6 CHRONIC LEFT-SIDED THORACIC BACK PAIN: ICD-10-CM

## 2022-05-03 DIAGNOSIS — M54.50 CHRONIC BILATERAL LOW BACK PAIN WITHOUT SCIATICA: Primary | ICD-10-CM

## 2022-05-03 DIAGNOSIS — G89.29 CHRONIC LEFT-SIDED THORACIC BACK PAIN: ICD-10-CM

## 2022-05-03 PROCEDURE — 97530 THERAPEUTIC ACTIVITIES: CPT

## 2022-05-03 PROCEDURE — 97110 THERAPEUTIC EXERCISES: CPT

## 2022-05-03 NOTE — PROGRESS NOTES
Daily Note     Today's date: 5/3/2022  Patient name: Eduardo Beltran  : 1970  MRN: [id number]  Referring provider: Kimi Holm MD  Dx:   Encounter Diagnosis     ICD-10-CM    1  Chronic bilateral low back pain without sciatica  M54 50     G89 29    2  Chronic left-sided thoracic back pain  M54 6     G89 29        Start Time: 1530  Stop Time: 1603  Total time in clinic (min): 33 minutes  Subjective: Pt arrives to first f/u since IE reporting that she currently has "a little pain" along (L) T/S and + (L) flank  Objective: See treatment diary below    Assessment: Implemented exercise diary per PT POC as indicated below - notified pt that interventions may be uncomfortable; however, pt should notify therapist of any increases in/sharp bouts of pain Sx  Pt tolerated treatment fair, noting increased back and (L)UE pain Sx with most interventions and opting to defer further exercises today involving (L)UE 2/2 reports of fatigue + pain following seated rhomboid stretch - advised pt to perform all activities within tolerance  Pt would benefit from continued PT  Plan: Cont /c PT POC  Progress as tolerated  Precautions: Ovarian cyst; sleep apnea; HTN; OA; anxiety; hyperlipidemia; chronic LBP     Date            Visit 1 2           FOTO IE            Re-Eval IE                Manuals            STM (L) rhomboids, T/S PS             T/S PA's                                       Neuro Re-Ed            TVA Contractions nv            [employer] Dogs             [facility] Front Planks              [employer]             No Moneys nv deferred           Tband Scissors             Wall ABCs nv deferred           FRO nv            Spidermans             UE PNF                          Ther Ex            Bridges nv 20x           Clams              Open Books nv 10ea           SL Hip Abd             Prone Y, T, I's             Sup Pec (S) 1/2 Foam             Toy Soldiers 1/2 Foam             Seated T/S Ext /c ball nv 90"ea +rot           Seated QL (S)             DL Leg Press             Tband IR/ER             Seated Rhomboid (S) nv 10"x10           Doorway Pec (S)             Wall [place] nv                         Ther Activity 4/29 05/03           Retro UBE nv 6' L1 twn pks           Sidesteps   RTB 1'x2                        Pt Edu 10' AL            Modalities 4/29 05/03           Prn                              Mayank Hernandez, PTA

## 2022-05-05 ENCOUNTER — OFFICE VISIT (OUTPATIENT)
Dept: PHYSICAL THERAPY | Facility: CLINIC | Age: 52
End: 2022-05-05
Payer: COMMERCIAL

## 2022-05-05 DIAGNOSIS — M54.6 CHRONIC LEFT-SIDED THORACIC BACK PAIN: Primary | ICD-10-CM

## 2022-05-05 DIAGNOSIS — M54.50 CHRONIC BILATERAL LOW BACK PAIN WITHOUT SCIATICA: ICD-10-CM

## 2022-05-05 DIAGNOSIS — G89.29 CHRONIC BILATERAL LOW BACK PAIN WITHOUT SCIATICA: ICD-10-CM

## 2022-05-05 DIAGNOSIS — G89.29 CHRONIC LEFT-SIDED THORACIC BACK PAIN: Primary | ICD-10-CM

## 2022-05-05 PROCEDURE — 97530 THERAPEUTIC ACTIVITIES: CPT

## 2022-05-05 PROCEDURE — 97110 THERAPEUTIC EXERCISES: CPT

## 2022-05-05 NOTE — PROGRESS NOTES
Daily Note     Today's date: 2022  Patient name: Simi Denton  : 1970  MRN: [id number]  Referring provider: Estefania Norwood MD  Dx:   Encounter Diagnosis     ICD-10-CM    1  Chronic left-sided thoracic back pain  M54 6     G89 29    2  Chronic bilateral low back pain without sciatica  M54 50     G89 29                   Subjective: Pt states that the pain is the "same," stating that it is "always there " Pt denies adverse reaction following TE's LV  She continues to report difficulty using the (L) UE because of her back pain  Pt arrives to today's tx session 8' late  Objective: See treatment diary below      Assessment: Tolerated treatment well  Pt reported feeling "tired" throughout today's exercise program  VC provided for prompting and recall of previously learned exercises  Patient demonstrated fatigue post treatment, exhibited good technique with therapeutic exercises and would benefit from continued PT to progress upper quarter endurance and postural stability to reduce stresses on the T/S and improve pt's tolerance to using the (L) UE daily as when cleaning  Plan: Continue per plan of care  Progress treatment as tolerated  Precautions: Ovarian cyst; sleep apnea; HTN; OA; anxiety; hyperlipidemia; chronic LBP     Date           Visit 1 2 3          FOTO IE            Re-Eval IE                Manuals           STM (L) rhomboids, T/S PS             T/S PA's                                       Neuro Re-Ed           TVA Contractions nv            [employer] Dogs             Mod Front Planks              [employer]             No Moneys nv deferred           Tband Scissors             Tband Rows, Ext   nv          Wall ABCs nv deferred           FRO nv  20x           Spidermans             UE PNF                          Ther Ex           Bridges nv 20x 20x          Clams              Open Books nv 10ea 10x5" ea SL Hip Abd             Prone Y, T, I's             Sup Pec (S) 1/2 Foam             Toy Soldiers 1/2 Foam             Seated T/S Ext /c ball nv 90"ea +rot 90" ea + rot          Seated QL (S)   5x10" ea          DL Leg Press             Tband IR/ER             Seated Rhomboid (S) nv 10"x10 5x10"          Doorway Pec (S)   5x10"          Wall [place] nv                         Ther Activity 4/29 05/03 5/5          Retro UBE nv 6' L1 twn pks 5' L0          Sidesteps   RTB 1'x2                        Pt Edu 10' AL  AL          Modalities 4/29 05/03 5/5          Prn

## 2022-05-10 ENCOUNTER — OFFICE VISIT (OUTPATIENT)
Dept: PHYSICAL THERAPY | Facility: CLINIC | Age: 52
End: 2022-05-10
Payer: COMMERCIAL

## 2022-05-10 DIAGNOSIS — G89.29 CHRONIC BILATERAL LOW BACK PAIN WITHOUT SCIATICA: Primary | ICD-10-CM

## 2022-05-10 DIAGNOSIS — M54.6 CHRONIC LEFT-SIDED THORACIC BACK PAIN: ICD-10-CM

## 2022-05-10 DIAGNOSIS — G89.29 CHRONIC LEFT-SIDED THORACIC BACK PAIN: ICD-10-CM

## 2022-05-10 DIAGNOSIS — M54.50 CHRONIC BILATERAL LOW BACK PAIN WITHOUT SCIATICA: Primary | ICD-10-CM

## 2022-05-10 PROCEDURE — 97530 THERAPEUTIC ACTIVITIES: CPT

## 2022-05-10 PROCEDURE — 97110 THERAPEUTIC EXERCISES: CPT

## 2022-05-10 PROCEDURE — 97112 NEUROMUSCULAR REEDUCATION: CPT

## 2022-05-10 NOTE — PROGRESS NOTES
Daily Note     Today's date: 5/10/2022  Patient name: Jenn Martinez  : 1970  MRN: [id number]  Referring provider: Karen Chandler MD  Dx:   Encounter Diagnosis     ICD-10-CM    1  Chronic bilateral low back pain without sciatica  M54 50     G89 29    2  Chronic left-sided thoracic back pain  M54 6     G89 29        Start Time: 1033  Stop Time: 1115  Total time in clinic (min): 42 minutes    Subjective: Pt arrives to Tx noting her back pain Sx remain constant and unchanged since IE - notes that exercises have not been helpful and is interested in utilizing heat and massage like she has had during previous bouts of PT  Pt notes she has started going to the gym with her daughter  Objective: See treatment diary below    Assessment: Provided pt education re: PT POC and timeline for tissue response to exercise; informed pt that she has only come to 3 PT sessions and it can take several weeks to begin noticing changes in mobility, strength, and pain resultant from exercise  Utilized mary kay over Tb during session to replicate what pt will eventually use at gym  Pt notes shldr ER more difficulty on (L)UE than (R)UE  MHP utilized post Tx for pain modulation - pt reports (+) response to  Pt would benefit from continued PT to progress upper quarter endurance and postural stability to reduce stresses on the T/S and improve pt's tolerance to using the (L) UE daily as when cleaning  Plan: Cont /c PT POC  Progress as tolerated  Precautions: Ovarian cyst; sleep apnea; HTN; OA; anxiety; hyperlipidemia; chronic LBP     Date 4/29 05/03 5/5 05/10         Visit 1 2 3 4         FOTO IE            Re-Eval IE                Manuals 4/29 05/03 5/5 05/10         STM (L) rhomboids, T/S PS             T/S PA's                                       Neuro Re-Ed 4/29 05/03 5/5 05/10         TVA Contractions nv            [employer] Dogs             [facility] Front Planks              [employer]             No Moneys nv deferred           [employer] [address]   nv [id number]         [place] Ext    [id number]         Wall ABCs nv deferred           FRO nv  20x           Spidermans             UE PNF                          Ther Ex 4/29 05/03 5/5 05/10         Bridges nv 20x 20x          Clams              Open Books nv 10ea 10x5" ea          SL Hip Abd             Prone Y, T, I's             Sup Pec (S) 1/2 Foam             Toy Soldiers 1/2 Foam             Seated T/S Ext /c ball nv 90"ea +rot 90" ea + rot          Seated QL (S)   5x10" ea          DL Leg Press             Tband IR/ER (B/L)    Grn 20ea         Seated Rhomboid (S) nv 10"x10 5x10"          Doorway Pec (S)   5x10" 10"x10         Wall [place] nv                         Ther Activity 4/29 05/03 5/5 05/10         Retro UBE nv 6' L1 twn pks 5' L0 5' L0 fwd         Sidesteps   RTB 1'x2  GTB 1'x2                      Pt Edu 10' AL  AL          Modalities 4/29 05/03 5/5 05/10         MHP T/S-L/S + (L) flank    10' prone                          Tere Spear, PTA

## 2022-05-12 ENCOUNTER — APPOINTMENT (OUTPATIENT)
Dept: PHYSICAL THERAPY | Facility: CLINIC | Age: 52
End: 2022-05-12
Payer: COMMERCIAL

## 2022-05-17 ENCOUNTER — OFFICE VISIT (OUTPATIENT)
Dept: PHYSICAL THERAPY | Facility: CLINIC | Age: 52
End: 2022-05-17
Payer: COMMERCIAL

## 2022-05-17 DIAGNOSIS — M54.50 CHRONIC BILATERAL LOW BACK PAIN WITHOUT SCIATICA: ICD-10-CM

## 2022-05-17 DIAGNOSIS — G89.29 CHRONIC LEFT-SIDED THORACIC BACK PAIN: Primary | ICD-10-CM

## 2022-05-17 DIAGNOSIS — G89.29 CHRONIC BILATERAL LOW BACK PAIN WITHOUT SCIATICA: ICD-10-CM

## 2022-05-17 DIAGNOSIS — M54.6 CHRONIC LEFT-SIDED THORACIC BACK PAIN: Primary | ICD-10-CM

## 2022-05-17 PROCEDURE — 97110 THERAPEUTIC EXERCISES: CPT

## 2022-05-17 PROCEDURE — 97140 MANUAL THERAPY 1/> REGIONS: CPT

## 2022-05-17 PROCEDURE — 97530 THERAPEUTIC ACTIVITIES: CPT

## 2022-05-17 NOTE — PROGRESS NOTES
Daily Note     Today's date: 2022  Patient name: Dorothea Edwards  : 1970  MRN: [id number]  Referring provider: Jenaro Forbes MD  Dx:   Encounter Diagnosis     ICD-10-CM    1  Chronic left-sided thoracic back pain  M54 6     G89 29    2  Chronic bilateral low back pain without sciatica  M54 50     G89 29                   Subjective: Pt states that she is in more pain today thru the (L) posterior and anterior rib area as well as thru the (L) shoulder  Objective: See treatment diary below      Assessment: Tolerated treatment well  Secondary to elevated pain levels preformed a modified tx session to focus on gentle ROM, breathing techniques, and T/S mobility  Pt performed a few stretches however deferred other exercises and requested MH  Post tx session, pt reported feeling "better " Educated pt today about goals/indications for diaphragmatic breathing; goals/indications of STM; effects of STM; indications for use of MH (muscle relaxation, pain relief); and effectiveness of of stretching, strengthening, and STM combined to combat pain  Patient demonstrated fatigue post treatment, exhibited good technique with therapeutic exercises and would benefit from continued PT to progress thoracic mobility, intercostal flexibility, and periscapular endurance to improve pt's tolerance to bending, lifting, and reaching daily as when performing ADLs and work duties  Plan: Continue per plan of care  Progress treatment as tolerated  Resume strengthening TE's NV  Precautions: Ovarian cyst; sleep apnea; HTN; OA; anxiety; hyperlipidemia; chronic LBP     Date 4/29 05/03 5/5 05/10 5/17        Visit 1 2 3 4 5        FOTO IE    nv        Re-Eval IE                Manuals 4/29 05/03 5/5 05/10 5/17        STM (L) rhomboids, T/S PS     AA        T/S PA's     AA Grade II                                  Neuro Re-Ed 4/29 05/03 5/5 05/10 5/17        Diaphragmatic breathing     2'        TVA Contractions nv nv        [employer] Dogs             Mod Front SphereUps              [employer]             No Moneys nv deferred           Tband Scissors             [place] Row   nv 8Rx50 nv        Kesier Ext    6Rx30 nv        Wall ABCs nv deferred           FRO nv  20x   nv        Spidermans             UE PNF                          Ther Ex 4/29 05/03 5/5 05/10 5/17        Bridges nv 20x 20x          Clams              Open Books nv 10ea 10x5" ea          Thread the needle with FR     10x        Cat/Camel     10x5"        Leslie Pose     10x5"        SL Hip Abd             Prone Y, T, I's     nv        Sup Pec (S) 1/2 Foam             Toy Soldiers 1/2 Foam             Seated T/S Ext /c ball nv 90"ea +rot 90" ea + rot          Seated QL (S)   5x10" ea          DL Leg Press             Tband IR/ER (B/L)    Grn 20ea nv        Seated Rhomboid (S) nv 10"x10 5x10"          Doorway Pec (S)   5x10" 10"x10         Wall [place] nv                         Ther Activity 4/29 05/03 5/5 05/10 5/17        Retro UBE nv 6' L1 twn pks 5' L0 5' L0 fwd nv        Sidesteps   RTB 1'x2  GTB 1'x2                      Pt Edu 10' AL  AL  AA        Modalities 4/29 05/03 5/5 05/10 5/17        MHP T/S-L/S + (L) flank    10' prone 10' SL

## 2022-05-19 ENCOUNTER — OFFICE VISIT (OUTPATIENT)
Dept: PHYSICAL THERAPY | Facility: CLINIC | Age: 52
End: 2022-05-19
Payer: COMMERCIAL

## 2022-05-19 DIAGNOSIS — G89.29 CHRONIC LEFT-SIDED THORACIC BACK PAIN: ICD-10-CM

## 2022-05-19 DIAGNOSIS — M54.6 CHRONIC LEFT-SIDED THORACIC BACK PAIN: ICD-10-CM

## 2022-05-19 DIAGNOSIS — M54.50 CHRONIC BILATERAL LOW BACK PAIN WITHOUT SCIATICA: Primary | ICD-10-CM

## 2022-05-19 DIAGNOSIS — G89.29 CHRONIC BILATERAL LOW BACK PAIN WITHOUT SCIATICA: Primary | ICD-10-CM

## 2022-05-19 PROCEDURE — 97112 NEUROMUSCULAR REEDUCATION: CPT

## 2022-05-19 PROCEDURE — 97530 THERAPEUTIC ACTIVITIES: CPT

## 2022-05-19 PROCEDURE — 97110 THERAPEUTIC EXERCISES: CPT

## 2022-05-19 NOTE — PROGRESS NOTES
Daily Note     Today's date: 2022  Patient name: Harriett Mueller  : 1970  MRN: [id number]  Referring provider: Isabela Johnson MD  Dx:   Encounter Diagnosis     ICD-10-CM    1  Chronic bilateral low back pain without sciatica  M54 50     G89 29    2  Chronic left-sided thoracic back pain  M54 6     G89 29        Start Time: 1030  Stop Time: 1112  Total time in clinic (min): 42 minutes  Subjective: Pt arrives to Tx reporting she feels "okay" today - reports pain along posterior shldr along tendonous insertions to [facility] joint that she attributes to inflammation; pt also reports (L) flank pain Sx that she attributes to inflammation  Pt states she goes to the gym every day with her daughter and has tried to incorporate her exercises from PT to at the gym  Objective: See treatment diary below    Assessment:  Provided pt /c green resistance band so she can perform sidestepping at the gym  Pt notes some fatigue in (L)UE musculature /c today's exercises  Pt requires occasional cuing for form /c TE /c good carryover  Pt requires only initial instruction for form /c additions of prone YTI's  Pt would benefit from continued PT to progress thoracic mobility, intercostal flexibility, and periscapular endurance to improve pt's tolerance to bending, lifting, and reaching daily as when performing ADLs and work duties  Plan: Cont /c PT POC  Progress as tolerated  Precautions: Ovarian cyst; sleep apnea; HTN; OA; anxiety; hyperlipidemia; chronic LBP     Date 4/29 05/03 5/5 05/10 5/17 05/19       Visit 1 2 3 4 5 6       FOTO IE    nv        Re-Eval IE                Manuals 4/29 05/03 5/5 05/10 5/17 05/19       STM (L) rhomboids, T/S PS     AA        T/S PA's     AA Grade II                                  Neuro Re-Ed 4/29 05/03 5/5 05/10 5/17 05/19       Diaphragmatic breathing     2'        TVA Contractions nv    nv        [employer] Dogs             Mod [employer]              [employer] No Moneys nv deferred           [employer]   nv 8Rx50 nv 8Rx30       Kesier Ext    6Rx30 nv 6Rx30       Wall ABCs nv deferred           FRO nv  20x   nv        Spidermans             UE PNF                          Ther Ex 4/29 05/03 5/5 05/10 5/17 05/19       Bridges nv 20x 20x          Clams              Open Books nv 10ea 10x5" ea          Thread the needle with FR     10x        Cat/Camel     10x5" 5"x10       Leslie Pose     10x5" 5"x10       SL Hip Abd             Prone Y, T, I's     nv 10ea (B/L)       Sup Pec (S) 1/2 Foam             Toy Soldiers 1/2 Foam             Seated T/S Ext /c ball nv 90"ea +rot 90" ea + rot          Seated QL (S)   5x10" ea          DL Leg Press             Tband IR/ER (B/L)    Grn 20ea nv Grn 20ea       Seated Rhomboid (S) nv 10"x10 5x10"          Doorway Pec (S)   5x10" 10"x10         Wall [place] nv                         Ther Activity 4/29 05/03 5/5 05/10 5/17 05/19       Retro UBE nv 6' L1 twn pks 5' L0 5' L0 fwd nv 3'/3' L1 twn pks       Sidesteps   RTB 1'x2  GTB 1'x2  GTB 1'x2                    Pt Edu 10' AL  AL  AA        Modalities 4/29 05/03 5/5 05/10 5/17 05/19       MHP T/S-L/S + (L) flank    10' prone 10' SL 10' supine                        Darra Webber, PTA

## 2022-05-24 ENCOUNTER — OFFICE VISIT (OUTPATIENT)
Dept: PHYSICAL THERAPY | Facility: CLINIC | Age: 52
End: 2022-05-24
Payer: COMMERCIAL

## 2022-05-24 DIAGNOSIS — G89.29 CHRONIC BILATERAL LOW BACK PAIN WITHOUT SCIATICA: Primary | ICD-10-CM

## 2022-05-24 DIAGNOSIS — G89.29 CHRONIC LEFT-SIDED THORACIC BACK PAIN: ICD-10-CM

## 2022-05-24 DIAGNOSIS — M54.6 CHRONIC LEFT-SIDED THORACIC BACK PAIN: ICD-10-CM

## 2022-05-24 DIAGNOSIS — M54.50 CHRONIC BILATERAL LOW BACK PAIN WITHOUT SCIATICA: Primary | ICD-10-CM

## 2022-05-24 PROCEDURE — 97110 THERAPEUTIC EXERCISES: CPT

## 2022-05-24 PROCEDURE — 97530 THERAPEUTIC ACTIVITIES: CPT

## 2022-05-24 NOTE — PROGRESS NOTES
Daily Note     Today's date: 2022  Patient name: Eduardo Beltran  : 1970  MRN: [id number]  Referring provider: Kimi Holm MD  Dx:   Encounter Diagnosis     ICD-10-CM    1  Chronic bilateral low back pain without sciatica  M54 50     G89 29    2  Chronic left-sided thoracic back pain  M54 6     G89 29        Start Time: 1035  Stop Time: 1108  Total time in clinic (min): 33 minutes  Subjective:  Pt reports she continues to go to they gym frequenty and has been compliant /c bands - notes her sidestepping band (green) is getting easier  Objective: See treatment diary below    Assessment:  Provied pt /c pink TB for spidermans, purple TB for overhead pull aparts, and sidestepping  Pt able to complete all interventions /c slight cuing for form /c new additions and demonstrates good recall of exercises previously performed  Pt would benefit from continued HEP compliance and gym attendance  Upon termination of Tx pt notes she would like to DC to home program/gym  Plan: DC to HEP  Precautions: Ovarian cyst; sleep apnea; HTN; OA; anxiety; hyperlipidemia; chronic LBP     Date 4/29 05/03 5/5 05/10 5/17 05/19 05/24      Visit 1 2 3 4 5 6 7      FOTO IE    nv        Re-Eval IE                Manuals 4/29 05/03 5/5 05/10 5/17 05/19 05/24      STM (L) rhomboids, T/S PS     AA        T/S PA's     AA Grade II                                  Neuro Re-Ed 4/29 05/03 5/5 05/10 5/17 05/19 05/24      Diaphragmatic breathing     2'        TVA Contractions nv    nv        Bird Dogs             Mod Front Planks              [employer]             No Moneys nv deferred           [employer]             Melissa Row   nv 8Rx50 nv 8Rx30 12Rx30      Kesier Ext    6Rx30 nv 6Rx30 6 5Rx30      Wall Cirlces cw/ccw (B/L) nv deferred     GMB 30ea      FRO nv  20x   nv  5'x20      Spidermans       RTBx10      UE PNF                          Ther Ex 4/29 05/03 5/5 05/10 5/17 05/19 05/24      Bridges nv 20x 20x          Clams              Open Books nv 10ea 10x5" ea          Thread the needle with FR     10x        Cat/Camel     10x5" 5"x10       Leslie Pose     10x5" 5"x10       SL Hip Abd             Prone Y, T, I's     nv 10ea (B/L)       Sup Pec (S) 1/2 Foam             Toy Soldiers 1/2 Foam             Seated T/S Ext /c ball nv 90"ea +rot 90" ea + rot          Seated QL (S)   5x10" ea          DL Leg Press             Tband IR/ER (B/L)    Grn 20ea nv Grn 20ea       Seated Rhomboid (S) nv 10"x10 5x10"          Doorway Pec (S)   5x10" 10"x10         Wall [place] nv                                      Ther Activity 4/29 05/03 5/5 05/10 5/17 05/19 05/24      Retro UBE nv 6' L1 twn pks 5' L0 5' L0 fwd nv 3'/3' L1 twn pks 3'/3' L2 twn pks      Sidesteps   RTB 1'x2  GTB 1'x2  GTB 1'x2 Purp 90"      [facility]       Purp 60"      Pt Edu 10' AL  AL  AA  SP HEP      Modalities 4/29 05/03 5/5 05/10 5/17 05/19 05/24      MHP T/S-L/S + (L) flank    10' prone 10' SL 10' supine                        Taylor Ba, PTA

## 2022-05-26 ENCOUNTER — APPOINTMENT (OUTPATIENT)
Dept: PHYSICAL THERAPY | Facility: CLINIC | Age: 52
End: 2022-05-26
Payer: COMMERCIAL

## 2022-07-27 ENCOUNTER — OFFICE VISIT (OUTPATIENT)
Dept: FAMILY MEDICINE CLINIC | Facility: CLINIC | Age: 52
End: 2022-07-27

## 2022-07-27 VITALS
RESPIRATION RATE: 18 BRPM | DIASTOLIC BLOOD PRESSURE: 96 MMHG | WEIGHT: 172 LBS | HEART RATE: 58 BPM | SYSTOLIC BLOOD PRESSURE: 148 MMHG | BODY MASS INDEX: 30.96 KG/M2 | OXYGEN SATURATION: 98 % | TEMPERATURE: 97.8 F

## 2022-07-27 DIAGNOSIS — I10 ESSENTIAL HYPERTENSION: ICD-10-CM

## 2022-07-27 DIAGNOSIS — M47.816 SPONDYLOSIS OF LUMBAR REGION WITHOUT MYELOPATHY OR RADICULOPATHY: ICD-10-CM

## 2022-07-27 DIAGNOSIS — N32.9 BLADDER DISEASE: Primary | ICD-10-CM

## 2022-07-27 PROCEDURE — 3080F DIAST BP >= 90 MM HG: CPT | Performed by: FAMILY MEDICINE

## 2022-07-27 PROCEDURE — 99214 OFFICE O/P EST MOD 30 MIN: CPT | Performed by: FAMILY MEDICINE

## 2022-07-27 PROCEDURE — 3077F SYST BP >= 140 MM HG: CPT | Performed by: FAMILY MEDICINE

## 2022-07-27 NOTE — ASSESSMENT & PLAN NOTE
Elevated, above goal  Has not taken Lisinopril today yet  Reviewed low salt diet and importance of physical activity

## 2022-07-27 NOTE — ASSESSMENT & PLAN NOTE
Acetaminophen PRN do NOT take more than 500 mg TID for up to 5 days  Continue exercises at home  Moist heat BID  May use Diclofenac gel 2 to 3 times a day PRN

## 2022-07-27 NOTE — PROGRESS NOTES
Assessment/Plan:    Essential hypertension  Elevated, above goal  Has not taken Lisinopril today yet  Reviewed low salt diet and importance of physical activity     Osteoarthritis  Acetaminophen PRN do NOT take more than 500 mg TID for up to 5 days  Continue exercises at home  Moist heat BID  May use Diclofenac gel 2 to 3 times a day PRN       Diagnoses and all orders for this visit:    Bladder disease  -     US kidney and bladder; Future  -     UA w Reflex to Microscopic w Reflex to Culture -Lab Collect; Future    Essential hypertension    Spondylosis of lumbar region without myelopathy or radiculopathy          Subjective:      Patient ID: Andres Gutierrez is a 45 y o  female  46 yo  female complains of pelvic pain, worse with urination, urinary hesitancy and increased urinary frequency  Denies fever, chills, nausea, change in BM, flank pain  Pain is described as pressure  No burning with urination  LBP: improved compared to prior, no red flags       The following portions of the patient's history were reviewed and updated as appropriate:   She  has a past medical history of Hyperlipidemia, Hypertension, JOSUÉ (obstructive sleep apnea) (7/16/2020), and Sinus pressure    She   Patient Active Problem List    Diagnosis Date Noted    Chronic bilateral low back pain without sciatica 10/27/2021    Other microscopic hematuria 05/17/2021    Enlarged uterus 01/11/2021    JOSUÉ (obstructive sleep apnea) 07/16/2020    Foot pain 02/13/2020    Snoring 01/14/2020    Pain in both feet 01/14/2020    Class 1 obesity due to excess calories with serious comorbidity and body mass index (BMI) of 32 0 to 32 9 in adult 11/17/2019    H  pylori infection 06/17/2019    Low ferritin 01/08/2019    Slow transit constipation 01/08/2019    Fatigue 10/08/2018    History of colonoscopy 10/08/2018    Overactive bladder 10/04/2018    Anxiety 08/30/2017    Chronic neck pain 08/30/2017    Chronic upper back pain 2017    Early satiety 2016    First degree hemorrhoids 2016    Constipation 10/14/2016    Epigastric pain 10/14/2016    Nocturnal leg cramps 2016    Palpitations 2016    Mild vitamin D deficiency 2016    Ovarian cyst, complex 2015    Headache 2015    Hyperlipidemia 2015    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    Osteoarthritis 2014    Essential hypertension 10/12/2012    Herpes simplex infection 2012     She  has a past surgical history that includes  section, low transverse; Laparoscopic total hysterectomy (2017); Larynx surgery; pr esophagogastroduodenoscopy transoral diagnostic (N/A, 3/26/2019); pr colonoscopy flx dx w/collj spec when pfrmd (N/A, 3/26/2019); and Tubal ligation  Her family history includes Hypertension in her mother  She  reports that she has never smoked  She has never used smokeless tobacco  She reports that she does not drink alcohol and does not use drugs  Current Outpatient Medications   Medication Sig Dispense Refill    ketoconazole (NIZORAL) 2 % shampoo Daily for 2 weeks straight and then on ",  and ":  Lather into scalp and skin on face, neck, chest, and back; leave on for 5 minutes and then rinse off completely   120 mL 1    lisinopril (ZESTRIL) 10 mg tablet Take 1 tablet (10 mg total) by mouth daily 90 tablet 3    Magnesium 100 MG TABS Take by mouth      meloxicam (MOBIC) 7 5 mg tablet Take 1 tablet (7 5 mg total) by mouth daily (Patient not taking: Reported on 2020) 30 tablet 5    phentermine (ADIPEX-P) 37 5 MG tablet Take 1 tablet (37 5 mg total) by mouth daily (Patient not taking: Reported on 10/5/2020) 30 tablet 2    Probiotic Product (PROBIOTIC DAILY PO) Take by mouth daily      RA Vitamin D-3 125 MCG (5000 UT) capsule Take 1 capsule (5,000 Units total) by mouth daily 90 capsule 1    simvastatin (ZOCOR) 20 mg tablet Take 1 tablet (20 mg total) by mouth daily at bedtime 90 tablet 3    valACYclovir (VALTREX) 1,000 mg tablet Take 1 tablet (1,000 mg total) by mouth daily (Patient not taking: Reported on 10/5/2020) 30 tablet 0     No current facility-administered medications for this visit  Current Outpatient Medications on File Prior to Visit   Medication Sig    ketoconazole (NIZORAL) 2 % shampoo Daily for 2 weeks straight and then on "Mondays, Wednesdays and Fridays":  Lather into scalp and skin on face, neck, chest, and back; leave on for 5 minutes and then rinse off completely   lisinopril (ZESTRIL) 10 mg tablet Take 1 tablet (10 mg total) by mouth daily    Magnesium 100 MG TABS Take by mouth    meloxicam (MOBIC) 7 5 mg tablet Take 1 tablet (7 5 mg total) by mouth daily (Patient not taking: Reported on 12/1/2020)    phentermine (ADIPEX-P) 37 5 MG tablet Take 1 tablet (37 5 mg total) by mouth daily (Patient not taking: Reported on 10/5/2020)    Probiotic Product (PROBIOTIC DAILY PO) Take by mouth daily    RA Vitamin D-3 125 MCG (5000 UT) capsule Take 1 capsule (5,000 Units total) by mouth daily    simvastatin (ZOCOR) 20 mg tablet Take 1 tablet (20 mg total) by mouth daily at bedtime    valACYclovir (VALTREX) 1,000 mg tablet Take 1 tablet (1,000 mg total) by mouth daily (Patient not taking: Reported on 10/5/2020)     No current facility-administered medications on file prior to visit       Review of Systems   Genitourinary: Positive for dysuria and frequency  All other systems reviewed and are negative  Objective:      /96 (BP Location: Left arm, Patient Position: Sitting, Cuff Size: Adult)   Pulse 58   Temp 97 8 °F (36 6 °C) (Temporal)   Resp 18   Wt 78 kg (172 lb)   LMP 07/02/2016   SpO2 98%   BMI 30 96 kg/m²          Physical Exam  Vitals and nursing note reviewed  Constitutional:       Appearance: She is well-developed  HENT:      Head: Normocephalic        Right Ear: External ear normal       Left Ear: External ear normal       Nose: Nose normal    Eyes:      Conjunctiva/sclera: Conjunctivae normal       Pupils: Pupils are equal, round, and reactive to light  Neck:      Thyroid: No thyromegaly  Cardiovascular:      Rate and Rhythm: Normal rate and regular rhythm  Heart sounds: Normal heart sounds  Pulmonary:      Effort: Pulmonary effort is normal       Breath sounds: Normal breath sounds  Abdominal:      Palpations: Abdomen is soft  Tenderness: There is no abdominal tenderness  There is no guarding or rebound  Musculoskeletal:         General: Normal range of motion  Cervical back: Normal range of motion and neck supple  Skin:     General: Skin is dry  Neurological:      Mental Status: She is alert and oriented to person, place, and time  Deep Tendon Reflexes: Reflexes are normal and symmetric

## 2022-08-05 ENCOUNTER — APPOINTMENT (OUTPATIENT)
Dept: LAB | Facility: HOSPITAL | Age: 52
End: 2022-08-05
Payer: COMMERCIAL

## 2022-08-05 ENCOUNTER — HOSPITAL ENCOUNTER (OUTPATIENT)
Dept: ULTRASOUND IMAGING | Facility: HOSPITAL | Age: 52
Discharge: HOME | End: 2022-08-05
Payer: COMMERCIAL

## 2022-08-05 DIAGNOSIS — N32.9 BLADDER DISEASE: ICD-10-CM

## 2022-08-05 LAB
BILIRUB UR QL STRIP: NEGATIVE
CLARITY UR: NORMAL
COLOR UR: YELLOW
GLUCOSE UR STRIP-MCNC: NEGATIVE MG/DL
HGB UR QL STRIP.AUTO: NEGATIVE
KETONES UR STRIP-MCNC: NEGATIVE MG/DL
LEUKOCYTE ESTERASE UR QL STRIP: NEGATIVE
NITRITE UR QL STRIP: NEGATIVE
PH UR STRIP.AUTO: 8 [PH]
PROT UR STRIP-MCNC: NEGATIVE MG/DL
SP GR UR STRIP.AUTO: 1.01 (ref 1–1.03)
UROBILINOGEN UR QL STRIP.AUTO: 0.2 E.U./DL

## 2022-08-05 PROCEDURE — 76770 US EXAM ABDO BACK WALL COMP: CPT

## 2022-08-05 PROCEDURE — 81003 URINALYSIS AUTO W/O SCOPE: CPT

## 2022-08-30 ENCOUNTER — TELEPHONE (OUTPATIENT)
Dept: FAMILY MEDICINE CLINIC | Facility: CLINIC | Age: 52
End: 2022-08-30

## 2022-09-06 ENCOUNTER — TELEPHONE (OUTPATIENT)
Dept: FAMILY MEDICINE CLINIC | Facility: CLINIC | Age: 52
End: 2022-09-06

## 2022-09-06 DIAGNOSIS — N28.1 RENAL CYST: ICD-10-CM

## 2022-09-06 DIAGNOSIS — N32.9 BLADDER DISEASE: Primary | ICD-10-CM

## 2022-09-06 NOTE — TELEPHONE ENCOUNTER
Patient came into the office asking about the status on US results, which was read to her (by maikol who was assisting me)  Patient is requesting a referral for Urology or Nephrology due to her still having pain in her kidneys  Appointment was made just in case

## 2022-09-08 ENCOUNTER — TELEPHONE (OUTPATIENT)
Dept: UROLOGY | Facility: AMBULATORY SURGERY CENTER | Age: 52
End: 2022-09-08

## 2022-09-08 NOTE — TELEPHONE ENCOUNTER
Can schedule a routine new patient visit, two followup US overall normal compared to ct last summer all resolved

## 2022-09-08 NOTE — TELEPHONE ENCOUNTER
Please Triage  New Patient    What is the reason for the patients appointment? NP- Referred by PCP with Bladder disease and Renal cyst  Kidney Pain all day every day  Feels her stomach inflamed  She's concerned about having cancer  Patient can be reached at [phone]    What office location does the patient prefer? [place]    Imaging/Lab Results: in Epic    Do we accept the patient's insurance or is the patient Self-Pay? Yes  Insurance Provider:  Plan Type/Number:  Member ID#: Has the patient had any previous Urologist(s)? No    Have patient records been requested? If not are records showing in Epic:   Yes    Has the patient had any outside testing done? No    Does the patient have a personal history of cancer?   No

## 2022-10-14 ENCOUNTER — OFFICE VISIT (OUTPATIENT)
Dept: UROLOGY | Facility: MEDICAL CENTER | Age: 52
End: 2022-10-14

## 2022-10-14 VITALS
HEART RATE: 73 BPM | DIASTOLIC BLOOD PRESSURE: 84 MMHG | WEIGHT: 167 LBS | SYSTOLIC BLOOD PRESSURE: 130 MMHG | OXYGEN SATURATION: 99 % | BODY MASS INDEX: 29.59 KG/M2 | HEIGHT: 63 IN

## 2022-10-14 DIAGNOSIS — N32.9 BLADDER DISEASE: ICD-10-CM

## 2022-10-14 DIAGNOSIS — N32.81 OAB (OVERACTIVE BLADDER): ICD-10-CM

## 2022-10-14 DIAGNOSIS — N28.1 RENAL CYST: Primary | ICD-10-CM

## 2022-10-14 LAB
SL AMB  POCT GLUCOSE, UA: NORMAL
SL AMB LEUKOCYTE ESTERASE,UA: NORMAL
SL AMB POCT BILIRUBIN,UA: NORMAL
SL AMB POCT BLOOD,UA: NORMAL
SL AMB POCT CLARITY,UA: CLEAR
SL AMB POCT COLOR,UA: YELLOW
SL AMB POCT KETONES,UA: NORMAL
SL AMB POCT NITRITE,UA: NORMAL
SL AMB POCT PH,UA: 7.5
SL AMB POCT SPECIFIC GRAVITY,UA: 1.02
SL AMB POCT URINE PROTEIN: NORMAL
SL AMB POCT UROBILINOGEN: 0.2

## 2022-10-14 RX ORDER — SOLIFENACIN SUCCINATE 10 MG/1
10 TABLET, FILM COATED ORAL DAILY
Qty: 30 TABLET | Refills: 3 | Status: SHIPPED | OUTPATIENT
Start: 2022-10-14

## 2022-10-14 NOTE — PROGRESS NOTES
10/14/2022      Chief Complaint   Patient presents with   • Follow-up     RENAL CYST          Assessment and Plan    45 y o  female new patient     1  Left renal cyst   · Stable on US  · No further work up/intervention     2  Bladder wall thickening  · Previously evident on CT, since resolved on multiple follow up ultrasounds  Likely secondary to acute cystitis at that time  · Denies tobacco history of episodes of gross hematuria  No need for cystoscopy evaluation at this time  3  OAB  · Previously managed on oxybutynin 5 mg po daily, no improvement  · PVR: 21 mL   · Will try Vesicare 10 mg po daily  · Reviewed MOA and common side effects  · Follow up in 6-8 weeks to reassess symptoms and obtain PVR  History of Present Illness  Mikey Hdz is a 45 y o  female here for evaluation of left renal cyst and bladder wall thickening  CT on 5/10/21 revealed mild anterior bladder wall thickening without findings to suggest inflammation/infection or neoplasm  Follow-up ultrasound 05/25/2021 revealed that the bladder wall thickening had decreased from 9 mm to 4 mm  Radiologist suspected possible indication of incomplete distension  Ultrasound kidney and bladder on 08/05/2022 revealed 1 9 cm lower pole cyst and normal bladder without focal thickening or mass  She gamaliel any history of nephrolithiasis  She denies any tobacco history  She denies any family history of kidney or bladder cancer  She denies any episodes of gross hematuria  She does have a previous history of OAB managed on oxbutynin 5 mg po daily without improvement in her urinary symptoms  She continues to have issues with frequency, urgency with urge urinary incontinence  She has nocturia 1-2 times per night  She denies issues with stress incontinence  She does confirm the sensation of incomplete bladder emptying but fortunately PVR today is 21 mL  Otherwise denies any flank or abdominal pain  Denies any fevers or chills    He is agreeable to plan above  Review of Systems   Constitutional: Negative for chills and fever  HENT: Negative  Respiratory: Negative  Cardiovascular: Negative  Gastrointestinal: Positive for abdominal pain (intermittent epigastric pain, she attributes to gas pain)  Negative for nausea and vomiting  Genitourinary: Positive for frequency and urgency (can have urge urinary incontinence  )  Negative for difficulty urinating, dysuria, flank pain and hematuria  Denies stress incontinence  Nocturia 1-2x per night  Confirms sensation of incomplete bladder emptying, waits to ensure she is fully empty   Musculoskeletal: Positive for back pain (with reaching, stretching, or position changes, likely muscular in etiuology)  Skin: Negative  Neurological: Negative  Vitals  Vitals:    10/14/22 0837   BP: 130/84   BP Location: Left arm   Patient Position: Sitting   Cuff Size: Standard   Pulse: 73   SpO2: 99%   Weight: 75 8 kg (167 lb)   Height: 5' 2 5" (1 588 m)       Physical Exam  Vitals reviewed  Constitutional:       General: She is not in acute distress  Appearance: Normal appearance  She is obese  She is not ill-appearing, toxic-appearing or diaphoretic  HENT:      Head: Normocephalic and atraumatic  Eyes:      Conjunctiva/sclera: Conjunctivae normal    Pulmonary:      Effort: Pulmonary effort is normal  No respiratory distress  Abdominal:      General: There is no distension  Palpations: Abdomen is soft  Tenderness: There is no abdominal tenderness  There is no right CVA tenderness, left CVA tenderness, guarding or rebound  Musculoskeletal:         General: Normal range of motion  Cervical back: Normal range of motion  Skin:     General: Skin is warm and dry  Neurological:      General: No focal deficit present  Mental Status: She is alert and oriented to person, place, and time     Psychiatric:         Mood and Affect: Mood normal          Behavior: Behavior normal          Thought Content: Thought content normal          Judgment: Judgment normal        Past History  Past Medical History:   Diagnosis Date   • Hyperlipidemia    • Hypertension    • JOSUÉ (obstructive sleep apnea) 7/16/2020   • Sinus pressure      Social History     Socioeconomic History   • Marital status: /Civil Union     Spouse name: None   • Number of children: None   • Years of education: None   • Highest education level: None   Occupational History   • None   Tobacco Use   • Smoking status: Never Smoker   • Smokeless tobacco: Never Used   Vaping Use   • Vaping Use: Never used   Substance and Sexual Activity   • Alcohol use: No   • Drug use: No   • Sexual activity: Yes     Partners: Male     Birth control/protection: Female Sterilization   Other Topics Concern   • None   Social History Narrative    No preference on [employer]an beliefs      Social Determinants of Health     Financial Resource Strain: High Risk   • Difficulty of Paying Living Expenses: Hard   Food Insecurity: Food Insecurity Present   • Worried About Running Out of Food in the Last Year: Sometimes true   • Ran Out of Food in the Last Year: Sometimes true   Transportation Needs: No Transportation Needs   • Lack of Transportation (Medical): No   • Lack of Transportation (Non-Medical): No   Physical Activity: Not on file   Stress: Not on file   Social Connections: Not on file   Intimate Partner Violence: Not on file   Housing Stability: Not on file     Social History     Tobacco Use   Smoking Status Never Smoker   Smokeless Tobacco Never Used     Family History   Problem Relation Age of Onset   • Hypertension Mother        The following portions of the patient's history were reviewed and updated as appropriate: allergies, current medications, past medical history, past social history, past surgical history and problem list     Results  No results found for this or any previous visit (from the past 1 hour(s))  ]  No results found for: PSA  Lab Results   Component Value Date    GLUCOSE 88 12/19/2014    CALCIUM 9 5 04/25/2022     12/19/2014    K 4 1 04/25/2022    CO2 30 04/25/2022     (H) 04/25/2022    BUN 22 04/25/2022    CREATININE 0 87 04/25/2022     Lab Results   Component Value Date    WBC 4 31 04/25/2022    HGB 13 3 04/25/2022    HCT 42 8 04/25/2022    MCV 97 04/25/2022     04/25/2022     Manny Vega PA-C

## 2022-12-16 ENCOUNTER — OFFICE VISIT (OUTPATIENT)
Dept: UROLOGY | Facility: MEDICAL CENTER | Age: 52
End: 2022-12-16

## 2022-12-16 VITALS
HEART RATE: 64 BPM | HEIGHT: 63 IN | WEIGHT: 166 LBS | SYSTOLIC BLOOD PRESSURE: 110 MMHG | DIASTOLIC BLOOD PRESSURE: 60 MMHG | BODY MASS INDEX: 29.41 KG/M2

## 2022-12-16 DIAGNOSIS — N32.9 BLADDER DISEASE: ICD-10-CM

## 2022-12-16 DIAGNOSIS — R31.29 MICROSCOPIC HEMATURIA: ICD-10-CM

## 2022-12-16 DIAGNOSIS — N32.81 OAB (OVERACTIVE BLADDER): Primary | ICD-10-CM

## 2022-12-16 DIAGNOSIS — N28.1 RENAL CYST: ICD-10-CM

## 2022-12-16 DIAGNOSIS — R31.0 GROSS HEMATURIA: ICD-10-CM

## 2022-12-16 LAB — POST-VOID RESIDUAL VOLUME, ML POC: 43 ML

## 2022-12-16 NOTE — PROGRESS NOTES
Office Visit- Urology  Zora Gomez 1970 MRN: [id number]      Assessment/Discussion/Plan    45 y o  female managed by     1  Left renal cyst   • US 8/2022 1 9 cm lower pole left renal cyst is seen  No description on whether the cyst was simple or complex  However, there was limited visualization/evaluation of the left kidney due to shadowing artifact  • Patient has been experiencing left sided flank pain for the past 2 years  • No prior imaging documented presence of this cyst, will plan to get a CT renal study before follow up  to determine stability as well as to better visualize given the overlaying artifact from [address] from 8/2022     2  Bladder wall thickening/Gross Hematuria   • Previously evident on CT, since resolved on multiple follow up ultrasounds  • All prior UA +/- microscopy negative for AUA defined microhematuria   • Patient reports two episodes of gross hematuria within the past month  No prior cystoscopy   • Denies tobacco history   • Will get a cystoscopy, CT urogram (BMP ordered), cytology for further evaluation of 2 episodes of gross hematuria       3  OAB  • Previously managed on oxybutynin 5 mg po daily, no improvement  Was started on Vesicare 10 mg po daily at last visit  • Little to minimal symptom improvement  • PVR: 43 ml   • Will hold off on further pharmacologic regimen changes until gross hematuria workup is completed as symptomatology may not be indicative of OAB  Chief Complaint:   Summer Gaviria is a 45 y o  female presenting to the office for a follow up visit regarding  OAB, left renal cyst         Subjective    Patient reports that her urinary symptoms such as urinary frequency, urgency, nocturia x1-3, double voiding has only minimally to mildly improved while on the vesicare  Of not, the patient reports that she had two instances of gross hematuria within the past month  Has not had a cystoscopy before   Prior CT imaging (5/10/2021) revealed mild anterior bladder wall thickening  The most recent US ) revealed resolution of the bladder wall thickening  However, this most recent ultrasound revealed a 1 9 cm lower pole left renal cyst but of note the left kidney was poorly visualized due to artifact  Upon review of the prior imaging there is no mention of a left renal cyst as well  Patient denies current or historical tobacco use  Her occupation is in skin care cosmetics  ROS:   Review of Systems   Constitutional: Negative for chills and fever  HENT: Negative  Eyes: Negative  Respiratory: Negative for shortness of breath  Cardiovascular: Negative for chest pain  Gastrointestinal: Positive for abdominal pain  Negative for nausea and vomiting  Genitourinary: Positive for flank pain, frequency, hematuria and urgency  Negative for difficulty urinating and dysuria  Neurological: Negative  Psychiatric/Behavioral: Negative  Past Medical History  Past Medical History:   Diagnosis Date   • Hyperlipidemia    • Hypertension    • JOSUÉ (obstructive sleep apnea) 2020   • Sinus pressure        Past Surgical History  Past Surgical History:   Procedure Laterality Date   •  SECTION, LOW TRANSVERSE      last assessed 8/20/15    • LAPAROSCOPIC TOTAL HYSTERECTOMY  2017   • LARYNX SURGERY      last assessed 8/20/15    • [place] COLONOSCOPY FLX DX W/COLLJ SPEC WHEN PFRMD N/A 3/26/2019    Procedure: COLONOSCOPY;  Surgeon: Wilberto Vergara MD;  Location: [facility]; Service: Gastroenterology   • [place] ESOPHAGOGASTRODUODENOSCOPY TRANSORAL DIAGNOSTIC N/A 3/26/2019    Procedure: ESOPHAGOGASTRODUODENOSCOPY (EGD); Surgeon: Wilberto Vergara MD;  Location: [facility];   Service: Gastroenterology   • TUBAL LIGATION      last assessed 8/20/15       Past Family History  Family History   Problem Relation Age of Onset   • Hypertension Mother        Past Social history  Social History     Socioeconomic History   • Marital status: /Civil Union     Spouse name: Not on file   • Number of children: Not on file   • Years of education: Not on file   • Highest education level: Not on file   Occupational History   • Not on file   Tobacco Use   • Smoking status: Never   • Smokeless tobacco: Never   Vaping Use   • Vaping Use: Never used   Substance and Sexual Activity   • Alcohol use: No   • Drug use: No   • Sexual activity: Yes     Partners: Male     Birth control/protection: Female Sterilization   Other Topics Concern   • Not on file   Social History Narrative    No preference on Yarsanism beliefs      Social Determinants of Health     Financial Resource Strain: High Risk   • Difficulty of Paying Living Expenses: Hard   Food Insecurity: Food Insecurity Present   • Worried About Running Out of Food in the Last Year: Sometimes true   • Ran Out of Food in the Last Year: Sometimes true   Transportation Needs: No Transportation Needs   • Lack of Transportation (Medical): No   • Lack of Transportation (Non-Medical): No   Physical Activity: Not on file   Stress: Not on file   Social Connections: Not on file   Intimate Partner Violence: Not on file   Housing Stability: Not on file       Current Medications  Current Outpatient Medications   Medication Sig Dispense Refill   • ketoconazole (NIZORAL) 2 % shampoo Daily for 2 weeks straight and then on "Mondays, Wednesdays and Fridays":  Lather into scalp and skin on face, neck, chest, and back; leave on for 5 minutes and then rinse off completely   120 mL 1   • lisinopril (ZESTRIL) 10 mg tablet Take 1 tablet (10 mg total) by mouth daily 90 tablet 3   • Magnesium 100 MG TABS Take by mouth     • Probiotic Product (PROBIOTIC DAILY PO) Take by mouth daily     • RA Vitamin D-3 125 MCG (5000 UT) capsule Take 1 capsule (5,000 Units total) by mouth daily 90 capsule 1   • simvastatin (ZOCOR) 20 mg tablet Take 1 tablet (20 mg total) by mouth daily at bedtime 90 tablet 3   • meloxicam (MOBIC) 7 5 mg tablet Take 1 tablet (7 5 mg total) by mouth daily 30 tablet 5   • phentermine (ADIPEX-P) 37 5 MG tablet Take 1 tablet (37 5 mg total) by mouth daily 30 tablet 2   • solifenacin (VESICARE) 10 MG tablet Take 1 tablet (10 mg total) by mouth daily 30 tablet 3   • valACYclovir (VALTREX) 1,000 mg tablet Take 1 tablet (1,000 mg total) by mouth daily (Patient not taking: Reported on 10/5/2020) 30 tablet 0     No current facility-administered medications for this visit  Allergies  No Known Allergies    OBJECTIVE    Vitals   Vitals:    12/16/22 1110   BP: 110/60   BP Location: Left arm   Patient Position: Sitting   Cuff Size: Large   Pulse: 64   Weight: 75 3 kg (166 lb)   Height: 5' 2 5" (1 588 m)       PVR: 43ml     Physical Exam  Constitutional:       Appearance: Normal appearance  She is normal weight  HENT:      Head: Normocephalic and atraumatic  Cardiovascular:      Rate and Rhythm: Normal rate  Pulmonary:      Effort: Pulmonary effort is normal  No respiratory distress  Abdominal:      Tenderness: There is left CVA tenderness  There is no right CVA tenderness  Musculoskeletal:         General: Normal range of motion  Cervical back: Normal range of motion and neck supple  Neurological:      General: No focal deficit present  Mental Status: She is alert and oriented to person, place, and time  Psychiatric:         Mood and Affect: Mood normal          Behavior: Behavior normal          Thought Content:  Thought content normal          Labs:     Lab Results   Component Value Date    CREATININE 0 87 04/25/2022      Lab Results   Component Value Date    HGBA1C 5 4 12/08/2020     Lab Results   Component Value Date    GLUCOSE 88 12/19/2014    CALCIUM 9 5 04/25/2022     12/19/2014    K 4 1 04/25/2022    CO2 30 04/25/2022     (H) 04/25/2022    BUN 22 04/25/2022    CREATININE 0 87 04/25/2022       I have personally reviewed all pertinent lab results and reviewed with patient    Imaging     US 8/2022    RENAL ULTRASOUND     INDICATION: N32 9: Bladder disorder, unspecified      COMPARISON: None     TECHNIQUE:   Ultrasound of the retroperitoneum was performed with a curvilinear transducer utilizing volumetric sweeps and still imaging techniques       FINDINGS:     KIDNEYS:  Symmetric and normal size  Right kidney:  10 3 x 5 2 x 5 0 cm  Volume 139 6 mL  Left kidney:  9 5 x 7 2 x 4 4 cm  Volume 159 6 mL     Right kidney  Normal echogenicity and contour  No mass is identified  No hydronephrosis  No shadowing calculi  No perinephric fluid collections      Left kidney  Poorly visualized/evaluated due to overlying shadowing artifact  No mass is identified  1 9 x 1 9 x 1 7 cm lower pole cyst is seen  No hydronephrosis  No shadowing calculi  No perinephric fluid collections      URETERS:  Nonvisualized      BLADDER:   Normally distended  No focal thickening or mass lesions  Bilateral ureteral jets detected  19 1 ml post void residual bladder volume         IMPRESSION:     Limited visualization/evaluation of the left kidney due to shadowing artifact  1 9 cm lower pole left renal cyst is seen  No nephrolithiasis, focal renal mass, or hydronephrosis bilaterally  A small amount of (19 mL) post void residual bladder volume   noted        Uzma Ordaz PA-C  Date: 12/16/2022 Time: 2:42 PM  [address] [facility]    This note was written using fluency dictation software  Please excuse any resulting minor grammatical errors

## 2022-12-26 ENCOUNTER — APPOINTMENT (OUTPATIENT)
Dept: LAB | Facility: HOSPITAL | Age: 52
End: 2022-12-26

## 2022-12-26 DIAGNOSIS — N28.1 RENAL CYST: ICD-10-CM

## 2022-12-26 DIAGNOSIS — R31.0 GROSS HEMATURIA: ICD-10-CM

## 2022-12-26 LAB
ANION GAP SERPL CALCULATED.3IONS-SCNC: 8 MMOL/L (ref 4–13)
BUN SERPL-MCNC: 22 MG/DL (ref 5–25)
CALCIUM SERPL-MCNC: 9.7 MG/DL (ref 8.3–10.1)
CHLORIDE SERPL-SCNC: 105 MMOL/L (ref 96–108)
CO2 SERPL-SCNC: 30 MMOL/L (ref 21–32)
CREAT SERPL-MCNC: 0.86 MG/DL (ref 0.6–1.3)
GFR SERPL CREATININE-BSD FRML MDRD: 76 ML/MIN/1.73SQ M
GLUCOSE P FAST SERPL-MCNC: 98 MG/DL (ref 65–99)
POTASSIUM SERPL-SCNC: 3.8 MMOL/L (ref 3.5–5.3)
SODIUM SERPL-SCNC: 143 MMOL/L (ref 135–147)

## 2022-12-28 ENCOUNTER — TELEPHONE (OUTPATIENT)
Dept: OTHER | Facility: HOSPITAL | Age: 52
End: 2022-12-28

## 2022-12-28 NOTE — TELEPHONE ENCOUNTER
Called patient - she is told that urine cytology was normal   She had no further questions or concerns

## 2022-12-28 NOTE — TELEPHONE ENCOUNTER
Please call patient to inform her that her urine cytology came back negative for high-grade urothelial carcinoma  We will continue to proceed with CT and cystoscopy for further evaluation of gross hematuria    Thank you

## 2023-01-07 ENCOUNTER — HOSPITAL ENCOUNTER (OUTPATIENT)
Dept: CT IMAGING | Facility: HOSPITAL | Age: 53
Discharge: HOME | End: 2023-01-07

## 2023-01-07 DIAGNOSIS — N28.1 RENAL CYST: ICD-10-CM

## 2023-01-07 RX ADMIN — IOHEXOL 100 ML: 300 INJECTION, SOLUTION INTRAVENOUS at 09:58

## 2023-02-08 ENCOUNTER — PROCEDURE VISIT (OUTPATIENT)
Dept: UROLOGY | Facility: MEDICAL CENTER | Age: 53
End: 2023-02-08

## 2023-02-08 VITALS
HEIGHT: 63 IN | SYSTOLIC BLOOD PRESSURE: 130 MMHG | DIASTOLIC BLOOD PRESSURE: 80 MMHG | WEIGHT: 167.9 LBS | HEART RATE: 65 BPM | OXYGEN SATURATION: 99 % | BODY MASS INDEX: 29.75 KG/M2

## 2023-02-08 DIAGNOSIS — R10.9 FLANK PAIN: ICD-10-CM

## 2023-02-08 DIAGNOSIS — N32.89 BLADDER WALL THICKENING: ICD-10-CM

## 2023-02-08 DIAGNOSIS — R39.15 URGENCY OF URINATION: Primary | ICD-10-CM

## 2023-02-08 LAB
POST-VOID RESIDUAL VOLUME, ML POC: 33 ML
SL AMB  POCT GLUCOSE, UA: NORMAL
SL AMB LEUKOCYTE ESTERASE,UA: NORMAL
SL AMB POCT BILIRUBIN,UA: NORMAL
SL AMB POCT BLOOD,UA: NORMAL
SL AMB POCT CLARITY,UA: CLEAR
SL AMB POCT COLOR,UA: YELLOW
SL AMB POCT KETONES,UA: NORMAL
SL AMB POCT NITRITE,UA: NORMAL
SL AMB POCT PH,UA: 7
SL AMB POCT SPECIFIC GRAVITY,UA: 1.01
SL AMB POCT URINE PROTEIN: NORMAL
SL AMB POCT UROBILINOGEN: 0.2

## 2023-02-08 NOTE — PROGRESS NOTES
HISTORY:    #1 chronic urinary frequency, voids every 2 hours or so daytime, but Solifenacin has helped a lot  That problem is now manageable    2  Bladder wall thickening on imaging    3  Left flank and CVA discomfort, comes and goes, she says occasionally severe     Cystoscopy     Date/Time 2/8/2023 2:13 PM     Performed by  Danyel Chandra MD     Authorized by Danyel Chandra MD          Procedure Details:  Procedure type: cystoscopy    Patient tolerance: Patient tolerated the procedure well with no immediate complications    Additional Procedure Details:     Patient presents for cystoscopy  I described the procedure, answered any questions, and we discussed potential risks and complications  Patient expressed understanding, and signed an informed consent document  The patient was carefully placed in lithotomy position on the examining table  The urethra was prepped with sterile disinfectant  The 13 Polish flexible cystoscope was passed in the urethra with the following findings:    Urethra: No stenosis     Bladder: Minimal trabeculation, no lesion tumors stones, normal    Residual urine estimated to be 100 mL  The patient tolerated the procedure well and was escorted from the examining table  ASSESSMENT / PLAN:    #1 bladder wall thickening is just a imaging finding of no clinical significance  2   She is content with Solifenacin for her urgency    3  Left flank and CVA discomfort does not appear to be kidney, nothing else seen on CT to explain it  She will consult with family doctor about that    The following portions of the patient's history were reviewed and updated as appropriate: allergies, current medications, past family history, past medical history, past social history, past surgical history and problem list     Review of Systems   All other systems reviewed and are negative  Objective:     Physical Exam  Constitutional:       Appearance: Normal appearance     Abdominal: Comments: Abdomen soft nontender, back nontender   Neurological:      Mental Status: She is alert  No results found for: PSA]  BUN   Date Value Ref Range Status   12/26/2022 22 5 - 25 mg/dL Final   12/19/2014 17 5 - 25 mg/dL Final     Creatinine   Date Value Ref Range Status   12/26/2022 0 86 0 60 - 1 30 mg/dL Final     Comment:     Standardized to IDMS reference method   12/19/2014 0 69 0 60 - 1 30 mg/dL Final     Comment:     Standardized to IDMS reference method     No components found for: CBC      Patient Active Problem List   Diagnosis   • Anxiety   • Chronic neck pain   • Chronic upper back pain   • Constipation   • Early satiety   • Epigastric pain   • Essential hypertension   • First degree hemorrhoids   • Headache   • Palpitations   • Herpes simplex infection   • Hyperlipidemia   • Mild vitamin D deficiency   • Nocturnal leg cramps   • Osteoarthritis   • Ovarian cyst, complex   • 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   • Overactive bladder   • Fatigue   • History of colonoscopy   • Low ferritin   • Slow transit constipation   • H  pylori infection   • Class 1 obesity due to excess calories with serious comorbidity and body mass index (BMI) of 32 0 to 32 9 in adult   • Snoring   • Pain in both feet   • Foot pain   • JOSUÉ (obstructive sleep apnea)   • Enlarged uterus   • Other microscopic hematuria   • Chronic bilateral low back pain without sciatica        Diagnoses and all orders for this visit:    Urgency of urination  -     POCT urine dip auto non-scope  -     POCT Measure PVR    Bladder wall thickening  -     POCT urine dip auto non-scope  -     POCT Measure PVR    Other orders  -     Cystoscopy           Patient ID: Lulu Beatty is a 45 y o  female        Current Outpatient Medications:   •  ketoconazole (NIZORAL) 2 % shampoo, Daily for 2 weeks straight and then on "Mondays, Wednesdays and Fridays":  Lather into scalp and skin on face, neck, chest, and back; leave on for 5 minutes and then rinse off completely  , Disp: 120 mL, Rfl: 1  •  lisinopril (ZESTRIL) 10 mg tablet, Take 1 tablet (10 mg total) by mouth daily, Disp: 90 tablet, Rfl: 3  •  Magnesium 100 MG TABS, Take by mouth, Disp: , Rfl:   •  Probiotic Product (PROBIOTIC DAILY PO), Take by mouth daily, Disp: , Rfl:   •  RA Vitamin D-3 125 MCG (5000 UT) capsule, Take 1 capsule (5,000 Units total) by mouth daily, Disp: 90 capsule, Rfl: 1  •  solifenacin (VESICARE) 10 MG tablet, Take 1 tablet (10 mg total) by mouth daily, Disp: 30 tablet, Rfl: 3  •  meloxicam (MOBIC) 7 5 mg tablet, Take 1 tablet (7 5 mg total) by mouth daily (Patient not taking: Reported on 2023), Disp: 30 tablet, Rfl: 5  •  phentermine (ADIPEX-P) 37 5 MG tablet, Take 1 tablet (37 5 mg total) by mouth daily (Patient not taking: Reported on 2023), Disp: 30 tablet, Rfl: 2  •  simvastatin (ZOCOR) 20 mg tablet, Take 1 tablet (20 mg total) by mouth daily at bedtime (Patient not taking: Reported on 2023), Disp: 90 tablet, Rfl: 3  •  valACYclovir (VALTREX) 1,000 mg tablet, Take 1 tablet (1,000 mg total) by mouth daily (Patient not taking: Reported on 10/5/2020), Disp: 30 tablet, Rfl: 0    Past Medical History:   Diagnosis Date   • Hyperlipidemia    • Hypertension    • JOSUÉ (obstructive sleep apnea) 2020   • Sinus pressure        Past Surgical History:   Procedure Laterality Date   •  SECTION, LOW TRANSVERSE      last assessed 8/20/15    • LAPAROSCOPIC TOTAL HYSTERECTOMY  2017   • LARYNX SURGERY      last assessed 8/20/15    • [place] COLONOSCOPY FLX DX W/COLLJ SPEC WHEN PFRMD N/A 3/26/2019    Procedure: COLONOSCOPY;  Surgeon: Abdifatah Ramos MD;  Location: [facility]; Service: Gastroenterology   • [place] ESOPHAGOGASTRODUODENOSCOPY TRANSORAL DIAGNOSTIC N/A 3/26/2019    Procedure: ESOPHAGOGASTRODUODENOSCOPY (EGD); Surgeon: Abdifatah Ramos MD;  Location: [facility];   Service: Gastroenterology   • TUBAL LIGATION      last assessed 8/20/15       Social History

## 2023-02-27 ENCOUNTER — VBI (OUTPATIENT)
Dept: ADMINISTRATIVE | Facility: OTHER | Age: 53
End: 2023-02-27

## 2023-03-24 ENCOUNTER — OFFICE VISIT (OUTPATIENT)
Dept: CARDIOLOGY CLINIC | Facility: CLINIC | Age: 53
End: 2023-03-24

## 2023-03-24 ENCOUNTER — APPOINTMENT (OUTPATIENT)
Dept: LAB | Facility: HOSPITAL | Age: 53
End: 2023-03-24

## 2023-03-24 VITALS
WEIGHT: 168.2 LBS | DIASTOLIC BLOOD PRESSURE: 86 MMHG | SYSTOLIC BLOOD PRESSURE: 132 MMHG | BODY MASS INDEX: 30.27 KG/M2 | HEART RATE: 64 BPM

## 2023-03-24 DIAGNOSIS — I10 ESSENTIAL HYPERTENSION: ICD-10-CM

## 2023-03-24 DIAGNOSIS — E66.09 CLASS 1 OBESITY DUE TO EXCESS CALORIES WITH SERIOUS COMORBIDITY AND BODY MASS INDEX (BMI) OF 32.0 TO 32.9 IN ADULT: ICD-10-CM

## 2023-03-24 DIAGNOSIS — E78.5 DYSLIPIDEMIA: ICD-10-CM

## 2023-03-24 DIAGNOSIS — F41.9 ANXIETY: ICD-10-CM

## 2023-03-24 DIAGNOSIS — R00.2 PALPITATIONS: Primary | ICD-10-CM

## 2023-03-24 LAB
ALBUMIN SERPL BCP-MCNC: 4.2 G/DL (ref 3.5–5)
ALP SERPL-CCNC: 48 U/L (ref 34–104)
ALT SERPL W P-5'-P-CCNC: 15 U/L (ref 7–52)
ANION GAP SERPL CALCULATED.3IONS-SCNC: 4 MMOL/L (ref 4–13)
AST SERPL W P-5'-P-CCNC: 17 U/L (ref 13–39)
BASOPHILS # BLD AUTO: 0.01 THOUSANDS/ÂΜL (ref 0–0.1)
BASOPHILS NFR BLD AUTO: 0 % (ref 0–1)
BILIRUB SERPL-MCNC: 0.76 MG/DL (ref 0.2–1)
BUN SERPL-MCNC: 17 MG/DL (ref 5–25)
CALCIUM SERPL-MCNC: 10 MG/DL (ref 8.4–10.2)
CHLORIDE SERPL-SCNC: 105 MMOL/L (ref 96–108)
CHOLEST SERPL-MCNC: 238 MG/DL
CO2 SERPL-SCNC: 32 MMOL/L (ref 21–32)
CREAT SERPL-MCNC: 0.84 MG/DL (ref 0.6–1.3)
EOSINOPHIL # BLD AUTO: 0.09 THOUSAND/ÂΜL (ref 0–0.61)
EOSINOPHIL NFR BLD AUTO: 2 % (ref 0–6)
ERYTHROCYTE [DISTWIDTH] IN BLOOD BY AUTOMATED COUNT: 12.2 % (ref 11.6–15.1)
EST. AVERAGE GLUCOSE BLD GHB EST-MCNC: 114 MG/DL
GFR SERPL CREATININE-BSD FRML MDRD: 78 ML/MIN/1.73SQ M
GLUCOSE P FAST SERPL-MCNC: 90 MG/DL (ref 65–99)
HBA1C MFR BLD: 5.6 %
HCT VFR BLD AUTO: 41.8 % (ref 34.8–46.1)
HDLC SERPL-MCNC: 72 MG/DL
HGB BLD-MCNC: 13.4 G/DL (ref 11.5–15.4)
IMM GRANULOCYTES # BLD AUTO: 0.01 THOUSAND/UL (ref 0–0.2)
IMM GRANULOCYTES NFR BLD AUTO: 0 % (ref 0–2)
LDLC SERPL CALC-MCNC: 149 MG/DL (ref 0–100)
LYMPHOCYTES # BLD AUTO: 1.48 THOUSANDS/ÂΜL (ref 0.6–4.47)
LYMPHOCYTES NFR BLD AUTO: 39 % (ref 14–44)
MCH RBC QN AUTO: 29.9 PG (ref 26.8–34.3)
MCHC RBC AUTO-ENTMCNC: 32.1 G/DL (ref 31.4–37.4)
MCV RBC AUTO: 93 FL (ref 82–98)
MONOCYTES # BLD AUTO: 0.49 THOUSAND/ÂΜL (ref 0.17–1.22)
MONOCYTES NFR BLD AUTO: 13 % (ref 4–12)
NEUTROPHILS # BLD AUTO: 1.75 THOUSANDS/ÂΜL (ref 1.85–7.62)
NEUTS SEG NFR BLD AUTO: 46 % (ref 43–75)
NRBC BLD AUTO-RTO: 0 /100 WBCS
PLATELET # BLD AUTO: 172 THOUSANDS/UL (ref 149–390)
PMV BLD AUTO: 12.7 FL (ref 8.9–12.7)
POTASSIUM SERPL-SCNC: 4.1 MMOL/L (ref 3.5–5.3)
PROT SERPL-MCNC: 7 G/DL (ref 6.4–8.4)
RBC # BLD AUTO: 4.48 MILLION/UL (ref 3.81–5.12)
SODIUM SERPL-SCNC: 141 MMOL/L (ref 135–147)
TRIGL SERPL-MCNC: 85 MG/DL
TSH SERPL DL<=0.05 MIU/L-ACNC: 1.99 UIU/ML (ref 0.45–4.5)
WBC # BLD AUTO: 3.83 THOUSAND/UL (ref 4.31–10.16)

## 2023-03-24 NOTE — PROGRESS NOTES
Cardiology Consultation   MD Birgit Brody MD Bynum Mass, DO, ADONIS Muñoz MD Edythe Bowman, DO, Erica Mosher DO, [facility]  -------------------------------------------------------------------  [facility]  [address]  [place], [place] [address]  [phone]  [phone]  03/24/23  Thor Gonsales  YOB: 1970   MRN: [id number]      Referring Physician: Juan C Reyna MD  [address]  [address]     HPI:  I am seeing this patient in cardiology consultation for: Syncope, palpitations    Thor Gonsales is a 52 y o  female with:   Hypertension  Obstructive sleep apnea  Anxiety  Constipation  Epigastric pain  Obesity  Chronic back pain    Tobacco: none  Alcohol: none  Drugs: none    Family History: Father had blood pressure issues    She presents today for evaluation with cardiology due to symptoms mostly consistent with palpitations and syncope  She also has multiple other complaints with back pain abdominal pain, arthralgias and hematuria at 1 point  She states about 2 weeks ago she had an episode of hematuria and shortly thereafter felt like she was about to blackout  Her electrocardiogram today is normal, last cardiac work-up was in 2016 with a stress echo that did show electrocardiographic changes however no echocardiographic changes of ischemia  She had echo last year showed EF of 60% with trace MR, mildly dilated aortic root at 3 7 cm  Blood pressure today initially was elevated but improved at repeat measurement    Review of Systems   Constitutional: Negative for chills and fever  HENT: Negative for facial swelling and sore throat  Eyes: Negative for visual disturbance  Respiratory: Positive for chest tightness and shortness of breath  Negative for cough and wheezing  Cardiovascular: Positive for chest pain and palpitations  Negative for leg swelling  Gastrointestinal: Positive for abdominal pain  Negative for blood in stool, constipation, diarrhea, nausea and vomiting  Endocrine: Negative for cold intolerance and heat intolerance  Genitourinary: Positive for hematuria  Negative for decreased urine volume, difficulty urinating and dysuria  Musculoskeletal: Positive for arthralgias and back pain  Negative for myalgias  Skin: Negative for rash  Neurological: Positive for dizziness, syncope and light-headedness  Negative for weakness and numbness  Psychiatric/Behavioral: Negative for agitation, behavioral problems and confusion  The patient is nervous/anxious  OBJECTIVE  Vitals:    03/24/23 0848   BP: 132/86   Pulse:        Physical Exam   Constitutional: awake, alert and oriented, in no acute distress, no obvious deformities  Head: Normocephalic, without obvious abnormality, atraumatic  Eyes: conjunctivae clear and moist  Sclera anicteric  No xanthelasmas  Pupils equal bilaterally  Extraocular motions are full  Ear nose mouth and throat: ears are symmetrical bilaterally, hearing appears to be equal bilaterally, no nasal discharge or epistaxis, oropharynx is clear with moist mucous membranes  Neck:  Trachea is midline, neck is supple, no thyromegaly or significant lymphadenopathy, there is full range of motion  Lungs: clear to auscultation bilaterally, no wheezes, no rales, no rhonchi, no accessory muscle use, breathing is nonlabored  Heart: regular rate and rhythm, S1, S2 normal, no murmur, no click, rub or gallop, no lower extremity edema  Abdomen: soft, non-tender; bowel sounds normal; no masses,  no organomegaly  Psychiatric:  Patient is oriented to time, place, person, mood/affect is negative for depression, anxiety, agitation, appears to have appropriate insight  Skin: Skin is warm, dry, intact  No obvious rashes or lesions on exposed extremities  Nail beds are pink with no cyanosis or clubbing        EKG:  Results for orders placed or performed in visit on 03/24/23   POCT ECG    Impression    Normal sinus rhythm, normal electrocardiogram        The 10-year ASCVD risk score (Lesa STANTON, et al , 2019) is: 2 6%    Values used to calculate the score:      Age: 52 years      Sex: Female      Is Non- : No      Diabetic: No      Tobacco smoker: No      Systolic Blood Pressure: 078 mmHg      Is BP treated: Yes      HDL Cholesterol: 73 mg/dL      Total Cholesterol: 238 mg/dL    IMPRESSION:  Palpitations  Syncope  Dyslipidemia, LDL was 156 last year -on Zocor 20 mg currently  Hypertension  Obstructive sleep apnea  Anxiety  Constipation  Epigastric pain  Obesity  Chronic back pain    DISCUSSION/RECOMMENDATIONS:  She presents with cardiac complaints of syncope  She has palpitations as well  Her electrocardiogram is normal   Episode happened about 2 weeks ago  We will investigate further with a Zio patch, not likely to capture episode with a 24 or 48-hour Holter  Blood pressure was elevated when first checked in the office but improved after repeat measurement, we will continue to monitor at this time, if persistently elevated can increase lisinopril  Her lipid panel was uncontrolled last year, she is currently on simvastatin 20 mg, would increase to 40 mg if still uncontrolled  Check new lipid panel today- she is fasting, will also check CMP CBC A1c TSH  Plan for follow-up after Zio patch we will go over this results and new labs, recheck BP    Tyrese Lawton DO, SHERRY, [facility]  --------------------------------------------------------------------------------  TREADMILL STRESS  No results found for this or any previous visit      ----------------------------------------------------------------------------------------------  NUCLEAR STRESS TEST: No results found for this or any previous visit      No results found for this or any previous visit       --------------------------------------------------------------------------------  CATH:  No results found for this or any previous visit     --------------------------------------------------------------------------------  ECHO:   Results for orders placed during the hospital encounter of 02/10/21    Echo complete with contrast if indicated    [address]  [phone]    Transthoracic Echocardiogram  2D, M-mode, Doppler, and Color Doppler    Study date:  10-Feb-2021    Patient: Michelle Maurice  MR number: [id number]  Account number: [de-identified]  : 1970  Age: 44 years  Gender: Female  Status: Outpatient  Location: [place] Echo 3  Height: 62 in  Weight: 174 7 lb  BP: 140/ 90 mmHg    Indications: Palpitations  Diagnoses: R00 2 - Palpitations    Sonographer:  MAGGI Horton  Primary Physician:  Odilia Ambrose MD  Referring Physician:  Silas Hunter MD  Group:  [facility]  Interpreting Physician:  Gina Strong DO    SUMMARY    LEFT VENTRICLE:  Systolic function was normal  Ejection fraction was estimated to be 60 %  There were no regional wall motion abnormalities  LEFT ATRIUM:  The atrium was borderline dilated  MITRAL VALVE:  There was trace regurgitation  AORTA:  The root exhibited mild dilatation, measuring upto 3 7 cm (2 0 cm/m2) at the sinuses of valsalva  SUMMARY MEASUREMENTS  2D measurements:  Unspecified Anatomy:   %FS was 31 8 %  Ao Diam was 3 cm  Ao asc was 3 2 cm   EDV(Teich) was 106 9 ml   EF(Teich) was 59 8 %  ESV(Teich) was 43 ml  IVSd was 0 8 cm  LA Diam was 3 3 cm  LAAs A2C was 17 8 cm2  LAAs A4C was 18 7 cm2  LAESV A-L A2C was 52 ml  LAESV A-L A4C was 57 2 ml  LAESV Index (A-L) was 30 2 ml/m2  LAESV MOD A2C was 49 8 ml  LAESV MOD A4C was 54 7 ml  LAESV(A-L) was 54 6 ml  LAESV(MOD BP) was 52 2 ml  LAESVInd MOD BP was 28 8 ml/m2    LALs A2C  was 5 2 cm  LALs A4C was 5 2 cm  LVEDV MOD A4C was 58 ml  LVEF MOD A4C was 63 4 %  LVESV MOD A4C was 21 2 ml  LVIDd was 4 8 cm  LVIDs was 3 3 cm  LVLd A4C was 6 7 cm  LVLs A4C was 5 7 cm  LVPWd was 0 8 cm  RAAs A4C was 14 1 cm2  RAESV A-L was 34 3 ml   RAESV MOD was 33 5 ml  RALs was 4 9 cm  RVIDd was 3 1 cm  SV MOD A4C was 36 8 ml   SV(Teich) was 63 9 ml   MM measurements:  Unspecified Anatomy:   TAPSE was 2 3 cm  PW measurements:  Unspecified Anatomy:   E' Avg was 0 1 m/s  E' Lat was 0 1 m/s  E' Sept was 0 1 m/s  E/E' Avg was 7 1   E/E' Lat was 6 8   E/E' Sept was 7 4   LVOT Env  Ti was 346 3 ms  LVOT VTI was 23 9 cm  LVOT Vmax was 1 1 m/s  LVOT Vmean was  0 7 m/s  LVOT maxPG was 4 7 mmHg  LVOT meanPG was 2 3 mmHg  MV A Andrew was 0 6 m/s  MV Dec [place] was 3 9 m/s2  MV DecT was 192 5 ms   MV E Andrew was 0 7 m/s  MV E/A Ratio was 1 3   HISTORY: PRIOR HISTORY: Patient has no history of cardiovascular disease  Risk factors: hypertension, diabetes, and medication-treated hypercholesterolemia  PROCEDURE: The study was performed in the [place] Echo 3  This was a routine study  The transthoracic approach was used  The study included complete 2D imaging, M-mode, complete spectral Doppler, and color Doppler  The heart rate was 57 bpm, at  the start of the study  Image quality was adequate  LEFT VENTRICLE: Size was normal  Systolic function was normal  Ejection fraction was estimated to be 60 %  There were no regional wall motion abnormalities  Wall thickness was normal  No evidence of apical thrombus  DOPPLER: Left  ventricular diastolic function parameters were normal     RIGHT VENTRICLE: The size was normal  Systolic function was normal  Wall thickness was normal     LEFT ATRIUM: The atrium was borderline dilated  RIGHT ATRIUM: Size was normal     MITRAL VALVE: Valve structure was normal  There was normal leaflet separation   DOPPLER: The transmitral velocity was within the normal range  There was no evidence for stenosis  There was trace regurgitation  AORTIC VALVE: The valve was trileaflet  Leaflets exhibited normal thickness and normal cuspal separation  DOPPLER: Transaortic velocity was within the normal range  There was no evidence for stenosis  There was no significant  regurgitation  TRICUSPID VALVE: The valve structure was normal  There was normal leaflet separation  DOPPLER: The transtricuspid velocity was within the normal range  There was no evidence for stenosis  There was no significant regurgitation  PULMONIC VALVE: Leaflets exhibited normal thickness, no calcification, and normal cuspal separation  DOPPLER: The transpulmonic velocity was within the normal range  There was no significant regurgitation  PERICARDIUM: There was no pericardial effusion  The pericardium was normal in appearance  AORTA: The root exhibited mild dilatation, measuring upto 3 7 cm (2 0 cm/m2) at the sinuses of valsalva  SYSTEMIC VEINS: IVC: The inferior vena cava was normal in size and course  The inferior vena cava was normal in size   Respirophasic changes were normal     SYSTEM MEASUREMENT TABLES    2D  %FS: 31 8 %  Ao Diam: 3 cm  Ao asc: 3 2 cm  EDV(Teich): 106 9 ml  EF(Teich): 59 8 %  ESV(Teich): 43 ml  IVSd: 0 8 cm  LA Diam: 3 3 cm  LAAs A2C: 17 8 cm2  LAAs A4C: 18 7 cm2  LAESV A-L A2C: 52 ml  LAESV A-L A4C: 57 2 ml  LAESV Index (A-L): 30 2 ml/m2  LAESV MOD A2C: 49 8 ml  LAESV MOD A4C: 54 7 ml  LAESV(A-L): 54 6 ml  LAESV(MOD BP): 52 2 ml  LAESVInd MOD BP: 28 8 ml/m2  LALs A2C: 5 2 cm  LALs A4C: 5 2 cm  LVEDV MOD A4C: 58 ml  LVEF MOD A4C: 63 4 %  LVESV MOD A4C: 21 2 ml  LVIDd: 4 8 cm  LVIDs: 3 3 cm  LVLd A4C: 6 7 cm  LVLs A4C: 5 7 cm  LVPWd: 0 8 cm  RAAs A4C: 14 1 cm2  RAESV A-L: 34 3 ml  RAESV MOD: 33 5 ml  RALs: 4 9 cm  RVIDd: 3 1 cm  SV MOD A4C: 36 8 ml  SV(Teich): 63 9 ml    MM  TAPSE: 2 3 cm    PW  E' Av 1 m/s  E' Lat: 0 1 m/s  E' Sept: 0 1 m/s  E/E' Av 1  E/E' Lat: 6 8  E/E' Sept: 7 4  LVOT Env  Ti: 346 3 ms  LVOT VTI: 23 9 cm  LVOT Vmax: 1 1 m/s  LVOT Vmean: 0 7 m/s  LVOT maxP 7 mmHg  LVOT meanP 3 mmHg  MV A Andrew: 0 6 m/s  MV Dec [place]: 3 9 m/s2  MV DecT: 192 5 ms  MV E Andrew: 0 7 m/s  MV E/A Ratio: 1 3    [facility]    Prepared and electronically signed by    Chaka Bellamy DO  Signed 10-Feb-2021 15:54:18    No results found for this or any previous visit     --------------------------------------------------------------------------------  HOLTER  No results found for this or any previous visit     --------------------------------------------------------------------------------  CAROTIDS  No results found for this or any previous visit  Diagnoses and all orders for this visit:    Palpitations  -     POCT ECG  -     AMB extended holter monitor; Future    Essential hypertension  -     AMB extended holter monitor; Future  -     Hemoglobin A1C  -     CBC and differential  -     Comprehensive metabolic panel  -     TSH, 3rd generation with Free T4 reflex    Anxiety  -     AMB extended holter monitor; Future    Class 1 obesity due to excess calories with serious comorbidity and body mass index (BMI) of 32 0 to 32 9 in adult    Dyslipidemia  -     Cancel: Lipid Panel with Direct LDL reflex; Future  -     Cancel: Lipid Panel with Direct LDL reflex; Future  -     Lipid Panel with Direct LDL reflex;  Future     ======================================================    Past Medical History:   Diagnosis Date   • Hyperlipidemia    • Hypertension    • JOSUÉ (obstructive sleep apnea) 2020   • Sinus pressure      Past Surgical History:   Procedure Laterality Date   •  SECTION, LOW TRANSVERSE      last assessed 8/20/15    • LAPAROSCOPIC TOTAL HYSTERECTOMY  2017   • LARYNX SURGERY      last assessed 8/20/15    • [place] COLONOSCOPY FLX DX W/COLLJ SPEC WHEN PFRMD N/A 3/26/2019    Procedure: COLONOSCOPY;  Surgeon: Larry Osborn Tico Carlton MD;  Location: [facility]; Service: Gastroenterology   • [place] ESOPHAGOGASTRODUODENOSCOPY TRANSORAL DIAGNOSTIC N/A 3/26/2019    Procedure: ESOPHAGOGASTRODUODENOSCOPY (EGD); Surgeon: Dillon Pretty MD;  Location: [facility]; Service: Gastroenterology   • TUBAL LIGATION      last assessed 8/20/15         Medications  Current Outpatient Medications   Medication Sig Dispense Refill   • lisinopril (ZESTRIL) 10 mg tablet Take 1 tablet (10 mg total) by mouth daily 90 tablet 3   • Magnesium 100 MG TABS Take by mouth     • Probiotic Product (PROBIOTIC DAILY PO) Take by mouth daily     • RA Vitamin D-3 125 MCG (5000 UT) capsule Take 1 capsule (5,000 Units total) by mouth daily 90 capsule 1   • simvastatin (ZOCOR) 20 mg tablet Take 1 tablet (20 mg total) by mouth daily at bedtime 90 tablet 3   • solifenacin (VESICARE) 10 MG tablet Take 1 tablet (10 mg total) by mouth daily 30 tablet 3   • ketoconazole (NIZORAL) 2 % shampoo Daily for 2 weeks straight and then on "Mondays, Wednesdays and Fridays":  Lather into scalp and skin on face, neck, chest, and back; leave on for 5 minutes and then rinse off completely  (Patient not taking: Reported on 3/24/2023) 120 mL 1   • meloxicam (MOBIC) 7 5 mg tablet Take 1 tablet (7 5 mg total) by mouth daily (Patient not taking: Reported on 2/8/2023) 30 tablet 5   • phentermine (ADIPEX-P) 37 5 MG tablet Take 1 tablet (37 5 mg total) by mouth daily (Patient not taking: Reported on 2/8/2023) 30 tablet 2   • valACYclovir (VALTREX) 1,000 mg tablet Take 1 tablet (1,000 mg total) by mouth daily (Patient not taking: Reported on 10/5/2020) 30 tablet 0     No current facility-administered medications for this visit          No Known Allergies    Social History     Socioeconomic History   • Marital status: /Civil Union     Spouse name: Not on file   • Number of children: Not on file   • Years of education: Not on file   • Highest education level: Not on file   Occupational History   • Not on file   Tobacco Use   • Smoking status: Never   • Smokeless tobacco: Never   Vaping Use   • Vaping Use: Never used   Substance and Sexual Activity   • Alcohol use: No   • Drug use: No   • Sexual activity: Yes     Partners: Male     Birth control/protection: Female Sterilization   Other Topics Concern   • Not on file   Social History Narrative    No preference on Rastafarian beliefs      Social Determinants of Health     Financial Resource Strain: High Risk   • Difficulty of Paying Living Expenses: Hard   Food Insecurity: Food Insecurity Present   • Worried About Running Out of Food in the Last Year: Sometimes true   • Ran Out of Food in the Last Year: Sometimes true   Transportation Needs: No Transportation Needs   • Lack of Transportation (Medical): No   • Lack of Transportation (Non-Medical):  No   Physical Activity: Not on file   Stress: Not on file   Social Connections: Not on file   Intimate Partner Violence: Not on file   Housing Stability: Not on file        Family History   Problem Relation Age of Onset   • Hypertension Mother        Lab Results   Component Value Date    WBC 4 31 04/25/2022    HGB 13 3 04/25/2022    HCT 42 8 04/25/2022    MCV 97 04/25/2022     04/25/2022      Lab Results   Component Value Date    SODIUM 143 12/26/2022    K 3 8 12/26/2022     12/26/2022    CO2 30 12/26/2022    BUN 22 12/26/2022    CREATININE 0 86 12/26/2022    GLUC 85 08/02/2016    CALCIUM 9 7 12/26/2022      Lab Results   Component Value Date    HGBA1C 5 4 12/08/2020      Lab Results   Component Value Date    CHOL 206 06/17/2015    CHOL 227 12/19/2014     Lab Results   Component Value Date    HDL 73 04/25/2022    HDL 60 09/01/2021    HDL 61 09/04/2020     Lab Results   Component Value Date    LDLCALC 156 (H) 04/25/2022    LDLCALC 169 (H) 09/01/2021    LDLCALC 158 (H) 09/04/2020     Lab Results   Component Value Date    TRIG 45 04/25/2022    TRIG 64 09/01/2021    TRIG 89 09/04/2020     No results found for: CHOLHDL   No results found for: INR, PROTIME       Patient Active Problem List    Diagnosis Date Noted   • Chronic bilateral low back pain without sciatica 10/27/2021   • Other microscopic hematuria 05/17/2021   • Enlarged uterus 01/11/2021   • JOSUÉ (obstructive sleep apnea) 07/16/2020   • Foot pain 02/13/2020   • Snoring 01/14/2020   • Pain in both feet 01/14/2020   • Class 1 obesity due to excess calories with serious comorbidity and body mass index (BMI) of 32 0 to 32 9 in adult 11/17/2019   • H  pylori infection 06/17/2019   • Low ferritin 01/08/2019   • Slow transit constipation 01/08/2019   • Fatigue 10/08/2018   • History of colonoscopy 10/08/2018   • Overactive bladder 10/04/2018   • Anxiety 08/30/2017   • Chronic neck pain 08/30/2017   • Chronic upper back pain 08/30/2017   • Early satiety 12/28/2016   • First degree hemorrhoids 12/28/2016   • Constipation 10/14/2016   • Epigastric pain 10/14/2016   • Nocturnal leg cramps 07/28/2016   • Palpitations 03/25/2016   • Mild vitamin D deficiency 03/25/2016   • Ovarian cyst, complex 09/23/2015   • Headache 09/01/2015   • Hyperlipidemia 09/01/2015   • 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 07/06/2015   • Osteoarthritis 12/19/2014   • Essential hypertension 10/12/2012   • Herpes simplex infection 08/22/2012       Portions of the record may have been created with voice recognition software  Occasional wrong word or "sound a like" substitutions may have occurred due to the inherent limitations of voice recognition software  Read the chart carefully and recognize, using context, where substitutions have occurred      Jose C Tomlinson DO, [facility]  3/24/2023 8:59 AM

## 2023-04-04 ENCOUNTER — TELEPHONE (OUTPATIENT)
Dept: CARDIOLOGY CLINIC | Facility: CLINIC | Age: 53
End: 2023-04-04

## 2023-04-04 NOTE — TELEPHONE ENCOUNTER
----- Message from Kofi Ashley, DO sent at 4/3/2023  9:33 PM EDT -----  Please call the patient regarding their normal result

## 2023-04-06 ENCOUNTER — APPOINTMENT (OUTPATIENT)
Dept: LAB | Facility: CLINIC | Age: 53
End: 2023-04-06

## 2023-04-06 ENCOUNTER — OFFICE VISIT (OUTPATIENT)
Dept: FAMILY MEDICINE CLINIC | Facility: CLINIC | Age: 53
End: 2023-04-06

## 2023-04-06 VITALS
BODY MASS INDEX: 31.17 KG/M2 | SYSTOLIC BLOOD PRESSURE: 132 MMHG | DIASTOLIC BLOOD PRESSURE: 80 MMHG | TEMPERATURE: 96.3 F | HEART RATE: 67 BPM | RESPIRATION RATE: 16 BRPM | OXYGEN SATURATION: 98 % | HEIGHT: 62 IN | WEIGHT: 169.4 LBS

## 2023-04-06 DIAGNOSIS — N32.81 OVERACTIVE BLADDER: ICD-10-CM

## 2023-04-06 DIAGNOSIS — E78.5 HYPERLIPIDEMIA, UNSPECIFIED HYPERLIPIDEMIA TYPE: Primary | ICD-10-CM

## 2023-04-06 DIAGNOSIS — R14.0 BLOATED ABDOMEN: ICD-10-CM

## 2023-04-06 DIAGNOSIS — K92.1 BLOOD IN STOOL: ICD-10-CM

## 2023-04-06 DIAGNOSIS — R20.0 ARM NUMBNESS LEFT: ICD-10-CM

## 2023-04-06 DIAGNOSIS — R53.83 FATIGUE, UNSPECIFIED TYPE: ICD-10-CM

## 2023-04-06 DIAGNOSIS — K59.00 CONSTIPATION, UNSPECIFIED CONSTIPATION TYPE: ICD-10-CM

## 2023-04-06 DIAGNOSIS — I10 ESSENTIAL HYPERTENSION: ICD-10-CM

## 2023-04-06 DIAGNOSIS — Z12.31 ENCOUNTER FOR SCREENING MAMMOGRAM FOR BREAST CANCER: ICD-10-CM

## 2023-04-06 LAB
FERRITIN SERPL-MCNC: 26 NG/ML (ref 8–388)
IRON SERPL-MCNC: 90 UG/DL (ref 50–170)
VIT B12 SERPL-MCNC: 825 PG/ML (ref 100–900)

## 2023-04-06 NOTE — PATIENT INSTRUCTIONS
Here to establish care and will rec checking b12 and iron and ferritin  Patient has had constipation and bloated abdomen and blood in stool occasionally  Rec GI consult  F-up with Cardiology as directed for syncope and palpitations hx  Patient has appt scheduled with vascular surgery - this was arranged previously but did not go and is going now April 14, 2023 for varicose veins b/l lower extremities based on notes in past sent by her last PCP  Foloow low cholesterol diet and take all meds as directed for HTN and hyperlipidemia

## 2023-04-06 NOTE — PROGRESS NOTES
Name: Willie Hahn      : 1970      MRN: [id number]  Encounter Provider: Tracey Jaimes DO  Encounter Date: 2023   Encounter department: [address] [place] [address]  Chief Complaint   Patient presents with   • New Patient Visit     Establish care  Pt states she has been having inflammation on left sided of her abdomen also has been having numbness on left arm   Pt would like to discuss CT scan results  Pt states she feels fluids on her ears would like checked      Patient Instructions   Here to establish care and will rec checking b12 and iron and ferritin  Patient has had constipation and bloated abdomen and blood in stool occasionally  Rec GI consult  F-up with Cardiology as directed for syncope and palpitations hx  Patient has appt scheduled with vascular surgery - this was arranged previously but did not go and is going now 2023 for varicose veins b/l lower extremities based on notes in past sent by her last PCP  Foloow low cholesterol diet and take all meds as directed for HTN and hyperlipidemia  Assessment & Plan     1  Hyperlipidemia, unspecified hyperlipidemia type    2  Essential hypertension    3  Fatigue, unspecified type  -     Vitamin B12; Future  -     Iron; Future  -     Ferritin; Future    4  Overactive bladder    5  Bloated abdomen  -     Ambulatory Referral to Gastroenterology; Future    6  Constipation, unspecified constipation type  -     Ambulatory Referral to Gastroenterology; Future    7  Blood in stool  -     Ambulatory Referral to Gastroenterology; Future    8  Encounter for screening mammogram for breast cancer  -     Mammo screening bilateral w 3d & cad; Future; Expected date: 2023    9  Arm numbness left  -     Vitamin B12; Future           Subjective      New Patient Visit (Establish care  Pt states she has been having inflammation on left sided of her abdomen also has been having numbness on left arm    Pt would like to discuss CT "scan results  Pt states she feels fluids on her ears would like checked )      Patient sees Urology as directed  Patient would like to have Vitamin B12 and iron and ferritin rechecked for hx of fatigue  Also has GI issues and constipation and bloated feeling and blood in stool as well it comes occasionally  Review of Systems   Constitutional: Positive for fatigue  HENT: Negative  Eyes: Negative  Respiratory: Negative  Cardiovascular: Negative  Gastrointestinal: Negative  Endocrine: Negative  Genitourinary: Negative  Musculoskeletal: Negative  Skin: Negative  Allergic/Immunologic: Negative  Neurological: Negative  Hematological: Negative  Psychiatric/Behavioral: Negative  Current Outpatient Medications on File Prior to Visit   Medication Sig   • lisinopril (ZESTRIL) 10 mg tablet Take 1 tablet (10 mg total) by mouth daily   • Magnesium 100 MG TABS Take by mouth   • Probiotic Product (PROBIOTIC DAILY PO) Take by mouth daily   • RA Vitamin D-3 125 MCG (5000 UT) capsule Take 1 capsule (5,000 Units total) by mouth daily   • simvastatin (ZOCOR) 20 mg tablet Take 1 tablet (20 mg total) by mouth daily at bedtime   • solifenacin (VESICARE) 10 MG tablet Take 1 tablet (10 mg total) by mouth daily   • [DISCONTINUED] ketoconazole (NIZORAL) 2 % shampoo Daily for 2 weeks straight and then on \"Mondays, Wednesdays and Fridays\":  Lather into scalp and skin on face, neck, chest, and back; leave on for 5 minutes and then rinse off completely   (Patient not taking: Reported on 3/24/2023)   • [DISCONTINUED] meloxicam (MOBIC) 7 5 mg tablet Take 1 tablet (7 5 mg total) by mouth daily (Patient not taking: Reported on 2/8/2023)   • [DISCONTINUED] phentermine (ADIPEX-P) 37 5 MG tablet Take 1 tablet (37 5 mg total) by mouth daily (Patient not taking: Reported on 2/8/2023)   • [DISCONTINUED] valACYclovir (VALTREX) 1,000 mg tablet Take 1 tablet (1,000 mg total) by mouth daily (Patient not taking: " "Reported on 10/5/2020)       Objective     /80 (BP Location: Left arm, Patient Position: Sitting, Cuff Size: Large)   Pulse 67   Temp (!) 96 3 °F (35 7 °C) (Temporal)   Resp 16   Ht 5' 2\" (1 575 m)   Wt 76 8 kg (169 lb 6 4 oz)   LMP 07/02/2016   SpO2 98%   BMI 30 98 kg/m²     Physical Exam  Constitutional:       Appearance: Normal appearance  She is well-developed  HENT:      Head: Normocephalic and atraumatic  Eyes:      Conjunctiva/sclera: Conjunctivae normal       Pupils: Pupils are equal, round, and reactive to light  Cardiovascular:      Rate and Rhythm: Normal rate and regular rhythm  Heart sounds: Normal heart sounds  Pulmonary:      Effort: Pulmonary effort is normal       Breath sounds: Normal breath sounds  Abdominal:      General: Abdomen is flat  Bowel sounds are normal       Palpations: Abdomen is soft  Musculoskeletal:         General: Normal range of motion  Cervical back: Normal range of motion and neck supple  Skin:     General: Skin is warm and dry  Capillary Refill: Capillary refill takes less than 2 seconds  Neurological:      General: No focal deficit present  Mental Status: She is alert and oriented to person, place, and time  Deep Tendon Reflexes: Reflexes are normal and symmetric  Psychiatric:         Mood and Affect: Mood normal          Behavior: Behavior normal          Thought Content:  Thought content normal          Judgment: Judgment normal        Yamila Borrow, DO  "

## 2023-04-12 PROBLEM — I78.1 ASYMPTOMATIC SPIDER VEINS OF BOTH LOWER EXTREMITIES: Status: ACTIVE | Noted: 2023-04-12

## 2023-05-01 ENCOUNTER — CLINICAL SUPPORT (OUTPATIENT)
Dept: CARDIOLOGY CLINIC | Facility: CLINIC | Age: 53
End: 2023-05-01

## 2023-05-01 DIAGNOSIS — R00.2 PALPITATIONS: Primary | ICD-10-CM

## 2023-05-01 NOTE — PROGRESS NOTES
Patient speaks only [place] and walked in office to have [place] put on  Her instructions did not come in [place]  Put Ziio on for patient  She is to wear for two weeks

## 2023-05-15 ENCOUNTER — CLINICAL SUPPORT (OUTPATIENT)
Dept: CARDIOLOGY CLINIC | Facility: CLINIC | Age: 53
End: 2023-05-15

## 2023-05-15 DIAGNOSIS — R00.2 PALPITATIONS: ICD-10-CM

## 2023-05-15 DIAGNOSIS — F41.9 ANXIETY: ICD-10-CM

## 2023-05-15 DIAGNOSIS — I10 ESSENTIAL HYPERTENSION: ICD-10-CM

## 2023-05-23 NOTE — RESULT ENCOUNTER NOTE
I personally reviewed the Zio patch monitoring strips  There is no significant sustained ventricular or supraventricular arrhythmias noted  Patient had a min HR of 46 bpm, max HR of 126 bpm, and avg HR of 70  bpm  Predominant underlying rhythm was Sinus Rhythm  Isolated SVEs  were rare (<1 0%), SVE Couplets were rare (<1 0%), and SVE Triplets were  rare (<1 0%)  Isolated VEs were rare (<1 0%), and no VE Couplets or VE  Triplets were present

## 2023-06-27 ENCOUNTER — OFFICE VISIT (OUTPATIENT)
Dept: GASTROENTEROLOGY | Facility: MEDICAL CENTER | Age: 53
End: 2023-06-27
Payer: COMMERCIAL

## 2023-06-27 ENCOUNTER — TELEPHONE (OUTPATIENT)
Age: 53
End: 2023-06-27

## 2023-06-27 ENCOUNTER — TELEPHONE (OUTPATIENT)
Dept: GASTROENTEROLOGY | Facility: MEDICAL CENTER | Age: 53
End: 2023-06-27

## 2023-06-27 VITALS
HEIGHT: 62 IN | TEMPERATURE: 98.3 F | SYSTOLIC BLOOD PRESSURE: 124 MMHG | BODY MASS INDEX: 31.5 KG/M2 | DIASTOLIC BLOOD PRESSURE: 78 MMHG | HEART RATE: 70 BPM | WEIGHT: 171.2 LBS

## 2023-06-27 DIAGNOSIS — K21.9 GASTROESOPHAGEAL REFLUX DISEASE WITHOUT ESOPHAGITIS: ICD-10-CM

## 2023-06-27 DIAGNOSIS — K62.5 RECTAL BLEEDING: Primary | ICD-10-CM

## 2023-06-27 DIAGNOSIS — R10.13 EPIGASTRIC ABDOMINAL PAIN: ICD-10-CM

## 2023-06-27 DIAGNOSIS — R14.0 BLOATING: ICD-10-CM

## 2023-06-27 DIAGNOSIS — K59.00 CONSTIPATION, UNSPECIFIED CONSTIPATION TYPE: ICD-10-CM

## 2023-06-27 PROCEDURE — 99204 OFFICE O/P NEW MOD 45 MIN: CPT | Performed by: INTERNAL MEDICINE

## 2023-06-27 RX ORDER — OMEPRAZOLE 40 MG/1
40 CAPSULE, DELAYED RELEASE ORAL
Qty: 30 CAPSULE | Refills: 3 | Status: SHIPPED | OUTPATIENT
Start: 2023-06-27

## 2023-06-27 RX ORDER — LINACLOTIDE 290 UG/1
290 CAPSULE, GELATIN COATED ORAL
Qty: 30 CAPSULE | Refills: 3 | Status: SHIPPED | OUTPATIENT
Start: 2023-06-27

## 2023-06-27 NOTE — TELEPHONE ENCOUNTER
Our mutual patient, Harrison Calvert, is scheduled for the following procedure(s):    Colonoscopy and EGD              On: 08/29/2023              With: Dr Dulce Heredia    We would like to request procedure clearance for our mutual patient         Thank you,  [facility]  [phone]

## 2023-06-27 NOTE — PROGRESS NOTES
[facility] - Outpatient Consultation  Viann Corporal 52 y o  female MRN: [id number]  Encounter: [id number]          ASSESSMENT AND PLAN:  69-year-old female with history of hypertension, hyperlipidemia who presents for evaluation  1  Rectal bleeding  2  Bloating  3  Constipation, unspecified constipation type  4  Gastroesophageal reflux disease without esophagitis  5  Epigastric abdominal pain    She reports history of rectal bleeding, bloating and epigastric abdominal pain  She had a CT scan in January which was normal   On review of the images he has a large stool burden all throughout her colon  Discussed that constipation is likely major contributor of her bloating and abdominal discomfort  I recommend starting Linzess 290 mcg daily in addition to high-fiber diet and fiber supplementation  She also reports rectal bleeding and this may be due to anorectal source such as hemorrhoids, fissure  Her last colonoscopy 2019 had a fair prep she was recommended repeat in 5 years  Given her family history of colon cancer in her father diagnosed in his 76s, rectal bleeding a fair prep on prior colonoscopy repeat colonoscopy will be scheduled with 2-day bowel prep for further work-up  She has GERD symptoms and is not currently on PPI  Have ordered omeprazole 40 mg to her pharmacy  CT noted small hiatal hernia  She has a history of H  pylori gastritis which was treated with antibiotics and stool testing confirmed eradication in the past   Given persistent symptoms EGD will be scheduled the time of her colonoscopy for screening for Martinez's esophagus, and to obtain gastric biopsies as well  - Colonoscopy; Future  - polyethylene glycol (GOLYTELY) 4000 mL solution; Take as instructed on bowel preparation instructions  Dispense: 4000 mL; Refill: 0  - Ambulatory Referral to Gastroenterology  - EGD; Future  - linaCLOtide (Linzess) 290 MCG CAPS;  Take 1 capsule by mouth daily before breakfast  Dispense: 30 capsule; Refill: 3  - omeprazole (PriLOSEC) 40 MG capsule; Take 1 capsule (40 mg total) by mouth daily before breakfast  Dispense: 30 capsule; Refill: 3            ______________________________________________________________________    HPI: 40-year-old female with history of hypertension, hyperlipidemia who presents for evaluation  She was last seen in the GI office in 2019  She was previously found to be positive for H  pylori on EGD biopsies and had completed triple therapy  Subsequent stool antigen testing was negative  She also history of chronic constipation for which she was maintained on Linzess 72 mcg daily  Interval history: She reports ongoing abdominal bloating and epigastric abdominal pain  This can occur after eating  She has nausea no vomiting at times  She reports having a bowel movement once daily but this is [place] type I stool associated significant straining and bright red blood per rectum on the toilet paper and mixed in with the stool  She reports having reflux symptoms but does not take any acid lowering medications  Reports family history of her father with colon cancer diagnosed in his 76s    March 2023 labs show hemoglobin 13, liver enzymes within normal limits, iron 90, ferritin 26    January 2023 CT abdomen pelvis renal protocol was normal     Prior EGD/colonoscopy   2016 colonoscopy was normal   2016 EGD was normal with unremarkable gastric biopsies    2019 colonoscopy was normal with fair prep she was recommended repeat in 5 years  2019 EGD noted mild gastritis otherwise normal   Gastric biopsies were positive for H  Pylori  Duodenal biopsy showed focal increased intraepithelial lymphocytes suggestive of [place] 1 celiac disease  Subsequent 2019 celiac antibody profile was negative    REVIEW OF SYSTEMS:    CONSTITUTIONAL: Denies any fever, chills, rigors, and weight loss  HEENT: No earache or tinnitus   Denies hearing loss or visual disturbances  CARDIOVASCULAR: No chest pain or palpitations  RESPIRATORY: Denies any cough, hemoptysis, shortness of breath or dyspnea on exertion  GASTROINTESTINAL: As noted in the History of Present Illness  GENITOURINARY: No problems with urination  Denies any hematuria or dysuria  NEUROLOGIC: No dizziness or vertigo, denies headaches  MUSCULOSKELETAL: Denies any muscle or joint pain  SKIN: Denies skin rashes or itching  ENDOCRINE: Denies excessive thirst  Denies intolerance to heat or cold  PSYCHOSOCIAL: Denies depression or anxiety  Denies any recent memory loss  Historical Information   Past Medical History:   Diagnosis Date   • Hyperlipidemia    • Hypertension    • JOSUÉ (obstructive sleep apnea) 2020   • Sinus pressure      Past Surgical History:   Procedure Laterality Date   •  SECTION, LOW TRANSVERSE      last assessed 8/20/15    • LAPAROSCOPIC TOTAL HYSTERECTOMY  2017   • LARYNX SURGERY      last assessed 8/20/15    • TX COLONOSCOPY FLX DX W/COLLJ SPEC WHEN PFRMD N/A 3/26/2019    Procedure: COLONOSCOPY;  Surgeon: Billey Meckel, MD;  Location: [facility] GI [facility]; Service: Gastroenterology   • TX ESOPHAGOGASTRODUODENOSCOPY TRANSORAL DIAGNOSTIC N/A 3/26/2019    Procedure: ESOPHAGOGASTRODUODENOSCOPY (EGD); Surgeon: Billey Meckel, MD;  Location: [facility];   Service: Gastroenterology   • TUBAL LIGATION      last assessed 8/20/15     Social History   Social History     Substance and Sexual Activity   Alcohol Use No     Social History     Substance and Sexual Activity   Drug Use No     Social History     Tobacco Use   Smoking Status Never   Smokeless Tobacco Never     Family History   Problem Relation Age of Onset   • Hypertension Mother    • Colon cancer Father        Meds/Allergies       Current Outpatient Medications:   •  lisinopril (ZESTRIL) 10 mg tablet  •  Magnesium 100 MG TABS  •  Probiotic Product (PROBIOTIC DAILY PO)  •  RA Vitamin D-3 125 MCG (5000 UT) capsule  • "simvastatin (ZOCOR) 20 mg tablet  •  solifenacin (VESICARE) 10 MG tablet    No Known Allergies        Objective     Blood pressure 124/78, pulse 70, temperature 98 3 °F (36 8 °C), temperature source Tympanic, height 5' 2\" (1 575 m), weight 77 7 kg (171 lb 3 2 oz), last menstrual period 07/02/2016, not currently breastfeeding  Body mass index is 31 31 kg/m²  PHYSICAL EXAM:      General Appearance:   Alert, cooperative, no distress   HEENT:   Normocephalic, atraumatic, anicteric  Neck:  Supple, symmetrical, trachea midline   Lungs:   Clear to auscultation bilaterally; no rales, rhonchi or wheezing; respirations unlabored    Heart[de-identified]   Regular rate and rhythm; no murmur, rub, or gallop  Abdomen:   Soft, mild generalized tenderness to the palpation without rebound or guarding non-distended; normal bowel sounds; no masses, no organomegaly    Genitalia:   Deferred    Rectal:   Deferred    Extremities:  No cyanosis, clubbing or edema    Pulses:  2+ and symmetric    Skin:  No jaundice, rashes, or lesions    Lymph nodes:  No palpable cervical lymphadenopathy        Lab Results:   No visits with results within 1 Day(s) from this visit  Latest known visit with results is:   Appointment on 04/06/2023   Component Date Value   • Vitamin B-12 04/06/2023 825    • Iron 04/06/2023 90    • Ferritin 04/06/2023 26          Radiology Results:   No results found    "

## 2023-08-21 ENCOUNTER — PATIENT MESSAGE (OUTPATIENT)
Dept: GASTROENTEROLOGY | Facility: MEDICAL CENTER | Age: 53
End: 2023-08-21

## 2023-08-22 ENCOUNTER — TELEPHONE (OUTPATIENT)
Dept: GASTROENTEROLOGY | Facility: MEDICAL CENTER | Age: 53
End: 2023-08-22

## 2023-08-22 NOTE — TELEPHONE ENCOUNTER
Spoke to patient and confirmed upcoming Colon/EGD for 08/29/2023. She is aware to be on a 2 day prep and verbalized understanding.

## 2023-08-23 ENCOUNTER — OFFICE VISIT (OUTPATIENT)
Dept: FAMILY MEDICINE CLINIC | Facility: CLINIC | Age: 53
End: 2023-08-23
Payer: COMMERCIAL

## 2023-08-23 VITALS
WEIGHT: 174.1 LBS | HEIGHT: 62 IN | BODY MASS INDEX: 32.04 KG/M2 | OXYGEN SATURATION: 98 % | DIASTOLIC BLOOD PRESSURE: 82 MMHG | TEMPERATURE: 96.3 F | HEART RATE: 68 BPM | SYSTOLIC BLOOD PRESSURE: 140 MMHG | RESPIRATION RATE: 16 BRPM

## 2023-08-23 DIAGNOSIS — E78.5 HYPERLIPIDEMIA, UNSPECIFIED HYPERLIPIDEMIA TYPE: ICD-10-CM

## 2023-08-23 DIAGNOSIS — M54.9 MID BACK PAIN ON LEFT SIDE: Primary | ICD-10-CM

## 2023-08-23 DIAGNOSIS — E66.9 OBESITY (BMI 30-39.9): ICD-10-CM

## 2023-08-23 DIAGNOSIS — I10 ESSENTIAL HYPERTENSION: ICD-10-CM

## 2023-08-23 PROCEDURE — 99214 OFFICE O/P EST MOD 30 MIN: CPT | Performed by: FAMILY MEDICINE

## 2023-08-23 RX ORDER — MELOXICAM 7.5 MG/1
7.5 TABLET ORAL DAILY PRN
Qty: 30 TABLET | Refills: 0 | Status: SHIPPED | OUTPATIENT
Start: 2023-08-23

## 2023-08-23 RX ORDER — CYCLOBENZAPRINE HCL 10 MG
10 TABLET ORAL DAILY PRN
Qty: 14 TABLET | Refills: 0 | Status: SHIPPED | OUTPATIENT
Start: 2023-08-23

## 2023-08-23 NOTE — PATIENT INSTRUCTIONS
Left mid back pain and will rec xray left mid thoracic back and also rec labs as directed for left mid back pain. Low cholesterol and low fat diet to get BMI lower than 25. Take BP med as directed. Patient stopped BP med as per patient. Recheck vitamin D 25-OH. Patient has colon screening scheduled.

## 2023-08-23 NOTE — PROGRESS NOTES
Name: Jeffrey Reardon      : 1970      MRN: [id number]  Encounter Provider: Iona Callahan DO  Encounter Date: 2023   Encounter department: [address] [place] [address]  Chief Complaint   Patient presents with   • Back Pain     Left sided back pain    • Follow-up     Pt would like a script for glucose monitor. Patient Instructions   Left mid back pain and will rec xray left mid thoracic back and also rec labs as directed for left mid back pain. Low cholesterol and low fat diet to get BMI lower than 25. Take BP med as directed. Patient stopped BP med as per patient. Recheck vitamin D 25-OH. Patient has colon screening scheduled. Assessment & Plan     1. Mid back pain on left side  -     meloxicam (MOBIC) 7.5 mg tablet; Take 1 tablet (7.5 mg total) by mouth daily as needed for moderate pain  -     cyclobenzaprine (FLEXERIL) 10 mg tablet; Take 1 tablet (10 mg total) by mouth daily as needed for muscle spasms  -     XR spine thoracic 3 vw; Future; Expected date: 2023  -     Antinuclear Antibodies (FAROOQ), IFA; Future  -     Sedimentation rate, automated; Future  -     RF Screen w/ Reflex to Titer; Future  -     Comprehensive metabolic panel; Future  -     CBC and differential; Future  -     Vitamin D 25 hydroxy; Future    2. Hyperlipidemia, unspecified hyperlipidemia type  -     Comprehensive metabolic panel; Future    3. Obesity (BMI 30-39.9)  -     Comprehensive metabolic panel; Future    4. Essential hypertension  -     Comprehensive metabolic panel; Future           Subjective      Back Pain (Left sided back pain )  Follow-up (Pt would like a script for glucose monitor.)      Review of Systems   Constitutional: Negative. HENT: Negative. Eyes: Negative. Respiratory: Negative. Cardiovascular: Negative. Gastrointestinal: Negative. Endocrine: Negative. Genitourinary: Negative. Musculoskeletal: Negative. Skin: Negative.     Allergic/Immunologic: Negative. Neurological: Negative. Hematological: Negative. Psychiatric/Behavioral: Negative. Current Outpatient Medications on File Prior to Visit   Medication Sig   • linaCLOtide (Linzess) 290 MCG CAPS Take 1 capsule by mouth daily before breakfast   • lisinopril (ZESTRIL) 10 mg tablet Take 1 tablet (10 mg total) by mouth daily   • Magnesium 100 MG TABS Take by mouth   • omeprazole (PriLOSEC) 40 MG capsule Take 1 capsule (40 mg total) by mouth daily before breakfast   • polyethylene glycol (GOLYTELY) 4000 mL solution Take as instructed on bowel preparation instructions   • Probiotic Product (PROBIOTIC DAILY PO) Take by mouth daily   • RA Vitamin D-3 125 MCG (5000 UT) capsule Take 1 capsule (5,000 Units total) by mouth daily   • simvastatin (ZOCOR) 20 mg tablet Take 1 tablet (20 mg total) by mouth daily at bedtime   • solifenacin (VESICARE) 10 MG tablet Take 1 tablet (10 mg total) by mouth daily (Patient not taking: Reported on 6/27/2023)       Objective     /82 (BP Location: Left arm, Patient Position: Sitting, Cuff Size: Adult)   Pulse 68   Temp (!) 96.3 °F (35.7 °C) (Temporal)   Resp 16   Ht 5' 2" (1.575 m)   Wt 79 kg (174 lb 1.6 oz)   LMP 07/02/2016   SpO2 98%   BMI 31.84 kg/m²     Physical Exam  Constitutional:       Appearance: She is well-developed. HENT:      Head: Normocephalic and atraumatic. Right Ear: External ear normal.      Left Ear: External ear normal.      Nose: Nose normal.   Eyes:      Conjunctiva/sclera: Conjunctivae normal.      Pupils: Pupils are equal, round, and reactive to light. Cardiovascular:      Rate and Rhythm: Normal rate and regular rhythm. Heart sounds: Normal heart sounds. Pulmonary:      Effort: Pulmonary effort is normal.      Breath sounds: Normal breath sounds. Musculoskeletal:         General: Normal range of motion. Cervical back: Normal range of motion and neck supple.       Comments: LEFT MID BACK PAIN     Skin: General: Skin is warm and dry. Capillary Refill: Capillary refill takes less than 2 seconds. Neurological:      General: No focal deficit present. Mental Status: She is alert and oriented to person, place, and time. Mental status is at baseline. Deep Tendon Reflexes: Reflexes are normal and symmetric. Psychiatric:         Mood and Affect: Mood normal.         Behavior: Behavior normal.         Thought Content:  Thought content normal.         Judgment: Judgment normal.       Camila Labs, DO

## 2023-08-24 ENCOUNTER — APPOINTMENT (OUTPATIENT)
Dept: LAB | Facility: HOSPITAL | Age: 53
End: 2023-08-24
Payer: COMMERCIAL

## 2023-08-24 ENCOUNTER — HOSPITAL ENCOUNTER (OUTPATIENT)
Dept: RADIOLOGY | Facility: HOSPITAL | Age: 53
Discharge: HOME | End: 2023-08-24
Payer: COMMERCIAL

## 2023-08-24 DIAGNOSIS — E78.5 HYPERLIPIDEMIA, UNSPECIFIED HYPERLIPIDEMIA TYPE: ICD-10-CM

## 2023-08-24 DIAGNOSIS — I10 ESSENTIAL HYPERTENSION: ICD-10-CM

## 2023-08-24 DIAGNOSIS — E66.9 OBESITY (BMI 30-39.9): ICD-10-CM

## 2023-08-24 DIAGNOSIS — M54.9 MID BACK PAIN ON LEFT SIDE: ICD-10-CM

## 2023-08-24 LAB
25(OH)D3 SERPL-MCNC: 43.2 NG/ML (ref 30–100)
ALBUMIN SERPL BCP-MCNC: 4 G/DL (ref 3.5–5)
ALP SERPL-CCNC: 46 U/L (ref 34–104)
ALT SERPL W P-5'-P-CCNC: 13 U/L (ref 7–52)
ANION GAP SERPL CALCULATED.3IONS-SCNC: 3 MMOL/L
AST SERPL W P-5'-P-CCNC: 14 U/L (ref 13–39)
BASOPHILS # BLD AUTO: 0.02 THOUSANDS/ÂΜL (ref 0–0.1)
BASOPHILS NFR BLD AUTO: 1 % (ref 0–1)
BILIRUB SERPL-MCNC: 0.63 MG/DL (ref 0.2–1)
BUN SERPL-MCNC: 20 MG/DL (ref 5–25)
CALCIUM SERPL-MCNC: 9.6 MG/DL (ref 8.4–10.2)
CHLORIDE SERPL-SCNC: 106 MMOL/L (ref 96–108)
CO2 SERPL-SCNC: 31 MMOL/L (ref 21–32)
CREAT SERPL-MCNC: 0.8 MG/DL (ref 0.6–1.3)
EOSINOPHIL # BLD AUTO: 0.1 THOUSAND/ÂΜL (ref 0–0.61)
EOSINOPHIL NFR BLD AUTO: 3 % (ref 0–6)
ERYTHROCYTE [DISTWIDTH] IN BLOOD BY AUTOMATED COUNT: 12.4 % (ref 11.6–15.1)
ERYTHROCYTE [SEDIMENTATION RATE] IN BLOOD: 24 MM/HOUR (ref 0–29)
GFR SERPL CREATININE-BSD FRML MDRD: 83 ML/MIN/1.73SQ M
GLUCOSE P FAST SERPL-MCNC: 94 MG/DL (ref 65–99)
HCT VFR BLD AUTO: 42.2 % (ref 34.8–46.1)
HGB BLD-MCNC: 13.2 G/DL (ref 11.5–15.4)
IMM GRANULOCYTES # BLD AUTO: 0.01 THOUSAND/UL (ref 0–0.2)
IMM GRANULOCYTES NFR BLD AUTO: 0 % (ref 0–2)
LYMPHOCYTES # BLD AUTO: 1.41 THOUSANDS/ÂΜL (ref 0.6–4.47)
LYMPHOCYTES NFR BLD AUTO: 35 % (ref 14–44)
MCH RBC QN AUTO: 29.3 PG (ref 26.8–34.3)
MCHC RBC AUTO-ENTMCNC: 31.3 G/DL (ref 31.4–37.4)
MCV RBC AUTO: 94 FL (ref 82–98)
MONOCYTES # BLD AUTO: 0.51 THOUSAND/ÂΜL (ref 0.17–1.22)
MONOCYTES NFR BLD AUTO: 13 % (ref 4–12)
NEUTROPHILS # BLD AUTO: 2.01 THOUSANDS/ÂΜL (ref 1.85–7.62)
NEUTS SEG NFR BLD AUTO: 48 % (ref 43–75)
NRBC BLD AUTO-RTO: 0 /100 WBCS
PLATELET # BLD AUTO: 152 THOUSANDS/UL (ref 149–390)
PMV BLD AUTO: 12.9 FL (ref 8.9–12.7)
POTASSIUM SERPL-SCNC: 4.1 MMOL/L (ref 3.5–5.3)
PROT SERPL-MCNC: 7 G/DL (ref 6.4–8.4)
RBC # BLD AUTO: 4.51 MILLION/UL (ref 3.81–5.12)
RHEUMATOID FACT SER QL LA: NEGATIVE
SODIUM SERPL-SCNC: 140 MMOL/L (ref 135–147)
WBC # BLD AUTO: 4.06 THOUSAND/UL (ref 4.31–10.16)

## 2023-08-24 PROCEDURE — 82306 VITAMIN D 25 HYDROXY: CPT

## 2023-08-24 PROCEDURE — 85025 COMPLETE CBC W/AUTO DIFF WBC: CPT

## 2023-08-24 PROCEDURE — 86430 RHEUMATOID FACTOR TEST QUAL: CPT

## 2023-08-24 PROCEDURE — 86038 ANTINUCLEAR ANTIBODIES: CPT

## 2023-08-24 PROCEDURE — 72072 X-RAY EXAM THORAC SPINE 3VWS: CPT

## 2023-08-24 PROCEDURE — 85652 RBC SED RATE AUTOMATED: CPT

## 2023-08-24 PROCEDURE — 80053 COMPREHEN METABOLIC PANEL: CPT

## 2023-08-24 PROCEDURE — 36415 COLL VENOUS BLD VENIPUNCTURE: CPT

## 2023-08-26 LAB — ANA TITR SER IF: NEGATIVE {TITER}

## 2023-08-28 RX ORDER — SODIUM CHLORIDE 9 MG/ML
125 INJECTION, SOLUTION INTRAVENOUS CONTINUOUS
Status: CANCELLED | OUTPATIENT
Start: 2023-08-28

## 2023-08-29 ENCOUNTER — HOSPITAL ENCOUNTER (OUTPATIENT)
Dept: GASTROENTEROLOGY | Facility: MEDICAL CENTER | Age: 53
Setting detail: OUTPATIENT SURGERY
Discharge: HOME | End: 2023-08-29
Payer: COMMERCIAL

## 2023-08-29 ENCOUNTER — ANESTHESIA EVENT (OUTPATIENT)
Dept: GASTROENTEROLOGY | Facility: MEDICAL CENTER | Age: 53
End: 2023-08-29

## 2023-08-29 ENCOUNTER — ANESTHESIA (OUTPATIENT)
Dept: GASTROENTEROLOGY | Facility: MEDICAL CENTER | Age: 53
End: 2023-08-29

## 2023-08-29 VITALS
SYSTOLIC BLOOD PRESSURE: 152 MMHG | HEIGHT: 62 IN | RESPIRATION RATE: 12 BRPM | DIASTOLIC BLOOD PRESSURE: 86 MMHG | BODY MASS INDEX: 32.02 KG/M2 | HEART RATE: 50 BPM | OXYGEN SATURATION: 98 % | WEIGHT: 174 LBS | TEMPERATURE: 97.1 F

## 2023-08-29 DIAGNOSIS — K62.5 RECTAL BLEEDING: ICD-10-CM

## 2023-08-29 DIAGNOSIS — K21.9 GASTROESOPHAGEAL REFLUX DISEASE WITHOUT ESOPHAGITIS: ICD-10-CM

## 2023-08-29 DIAGNOSIS — R14.0 BLOATING: ICD-10-CM

## 2023-08-29 PROCEDURE — 88305 TISSUE EXAM BY PATHOLOGIST: CPT | Performed by: PATHOLOGY

## 2023-08-29 PROCEDURE — 88342 IMHCHEM/IMCYTCHM 1ST ANTB: CPT | Performed by: PATHOLOGY

## 2023-08-29 PROCEDURE — 88341 IMHCHEM/IMCYTCHM EA ADD ANTB: CPT | Performed by: PATHOLOGY

## 2023-08-29 RX ORDER — SODIUM CHLORIDE 9 MG/ML
125 INJECTION, SOLUTION INTRAVENOUS CONTINUOUS
Status: DISCONTINUED | OUTPATIENT
Start: 2023-08-29 | End: 2023-09-02 | Stop reason: HOSPADM

## 2023-08-29 RX ORDER — PROPOFOL 10 MG/ML
INJECTION, EMULSION INTRAVENOUS AS NEEDED
Status: DISCONTINUED | OUTPATIENT
Start: 2023-08-29 | End: 2023-08-29

## 2023-08-29 RX ORDER — LIDOCAINE HYDROCHLORIDE 20 MG/ML
INJECTION, SOLUTION EPIDURAL; INFILTRATION; INTRACAUDAL; PERINEURAL AS NEEDED
Status: DISCONTINUED | OUTPATIENT
Start: 2023-08-29 | End: 2023-08-29

## 2023-08-29 RX ADMIN — PROPOFOL 150 MG: 10 INJECTION, EMULSION INTRAVENOUS at 08:15

## 2023-08-29 RX ADMIN — PROPOFOL 50 MG: 10 INJECTION, EMULSION INTRAVENOUS at 08:18

## 2023-08-29 RX ADMIN — PROPOFOL 50 MG: 10 INJECTION, EMULSION INTRAVENOUS at 08:25

## 2023-08-29 RX ADMIN — PROPOFOL 50 MG: 10 INJECTION, EMULSION INTRAVENOUS at 08:17

## 2023-08-29 RX ADMIN — PROPOFOL 50 MG: 10 INJECTION, EMULSION INTRAVENOUS at 08:31

## 2023-08-29 RX ADMIN — SODIUM CHLORIDE 125 ML/HR: 0.9 INJECTION, SOLUTION INTRAVENOUS at 08:02

## 2023-08-29 RX ADMIN — LIDOCAINE HYDROCHLORIDE 60 MG: 20 INJECTION, SOLUTION EPIDURAL; INFILTRATION; INTRACAUDAL at 08:15

## 2023-08-29 NOTE — ANESTHESIA POSTPROCEDURE EVALUATION
Post-Op Assessment Note    CV Status:  Stable    Pain management: adequate     Mental Status:  Alert and awake   Hydration Status:  Euvolemic   PONV Controlled:  Controlled   Airway Patency:  Patent      Post Op Vitals Reviewed: Yes      Staff: Anesthesiologist         No notable events documented.     /80 (08/29/23 0842)    Temp     Pulse 63 (08/29/23 0842)   Resp 14 (08/29/23 0842)    SpO2 99 % (08/29/23 0842)

## 2023-08-29 NOTE — ANESTHESIA PREPROCEDURE EVALUATION
Procedure:  COLONOSCOPY  EGD    Relevant Problems   ANESTHESIA (within normal limits)      CARDIO   (+) Essential hypertension   (+) Hyperlipidemia      MUSCULOSKELETAL   (+) Chronic bilateral low back pain without sciatica   (+) Chronic upper back pain   (+) Osteoarthritis      NEURO/PSYCH   (+) Anxiety   (+) Chronic bilateral low back pain without sciatica   (+) Chronic neck pain   (+) Chronic upper back pain   (+) Headache      PULMONARY   (+) JOSUÉ (obstructive sleep apnea)        Physical Exam    Airway    Mallampati score: II  TM Distance: >3 FB  Neck ROM: full     Dental       Cardiovascular  Rhythm: regular, Rate: normal,     Pulmonary  Breath sounds clear to auscultation,     Other Findings        Anesthesia Plan  ASA Score- 2     Anesthesia Type- IV sedation with anesthesia with ASA Monitors. Additional Monitors:   Airway Plan:           Plan Factors-Exercise tolerance (METS): >4 METS. Chart reviewed. Patient summary reviewed. Patient is not a current smoker. Induction- intravenous. Postoperative Plan-     Informed Consent- Anesthetic plan and risks discussed with patient.

## 2023-08-29 NOTE — H&P
H&P EXAM - Outpatient Endoscopy   Lauren Verduzco 52 y.o. female MRN: [id number]    [facility]   Encounter: [id number]        History and Physical - [facility]  Indra Stinson 52 y.o. female MRN: [id number]                  HPI: Indra Stinson is a 52y.o. year old female who presents for rectal bleeding, abdominal pain, gerd, family history of colon cancer      REVIEW OF SYSTEMS: Per the HPI, and otherwise unremarkable. Historical Information   Past Medical History:   Diagnosis Date   • Hyperlipidemia    • Hypertension    • JOSUÉ (obstructive sleep apnea) 2020   • Sinus pressure      Past Surgical History:   Procedure Laterality Date   •  SECTION, LOW TRANSVERSE      last assessed 8/20/15    • LAPAROSCOPIC TOTAL HYSTERECTOMY  2017   • LARYNX SURGERY      last assessed 8/20/15    • [place] COLONOSCOPY FLX DX W/COLLJ SPEC WHEN PFRMD N/A 3/26/2019    Procedure: COLONOSCOPY;  Surgeon: Dai Escobar MD;  Location: [facility]; Service: Gastroenterology   • CO ESOPHAGOGASTRODUODENOSCOPY TRANSORAL DIAGNOSTIC N/A 3/26/2019    Procedure: ESOPHAGOGASTRODUODENOSCOPY (EGD); Surgeon: Dai Escobar MD;  Location: [facility];   Service: Gastroenterology   • TUBAL LIGATION      last assessed 8/20/15     Social History   Social History     Substance and Sexual Activity   Alcohol Use No     Social History     Substance and Sexual Activity   Drug Use No     Social History     Tobacco Use   Smoking Status Never   Smokeless Tobacco Never     Family History   Problem Relation Age of Onset   • Hypertension Mother    • Colon cancer Father        Meds/Allergies     (Not in a hospital admission)      No Known Allergies    Objective     /82   Pulse 64   Temp (!) 97.1 °F (36.2 °C) (Temporal)   Resp 18   Ht 5' 2" (1.575 m)   Wt 78.9 kg (174 lb)   LMP 2016   SpO2 99%   BMI 31.83 kg/m²       PHYSICAL EXAM    Gen: NAD  CV: RRR  CHEST: Clear  ABD: soft, NT/ND  EXT: no edema      ASSESSMENT/PLAN:  This is a 52y.o. year old female here for egd/colonoscopy, and she is stable and optimized for her procedure.

## 2023-09-01 PROCEDURE — 88305 TISSUE EXAM BY PATHOLOGIST: CPT | Performed by: PATHOLOGY

## 2023-09-01 PROCEDURE — 88341 IMHCHEM/IMCYTCHM EA ADD ANTB: CPT | Performed by: PATHOLOGY

## 2023-09-01 PROCEDURE — 88342 IMHCHEM/IMCYTCHM 1ST ANTB: CPT | Performed by: PATHOLOGY

## 2023-09-01 NOTE — RESULT ENCOUNTER NOTE
Please call the patient with the results    The  colon polyp removed was called an adenoma. This is a pre-cancerous lesion and was completely removed. There was no evidence of cancer in the polyp.      She should have the colonoscopy repeated in 5 years due to a history of colon polyps  The rest of the biopsies were overall normal

## 2023-09-11 DIAGNOSIS — M54.50 CHRONIC BILATERAL LOW BACK PAIN WITHOUT SCIATICA: ICD-10-CM

## 2023-09-11 DIAGNOSIS — G89.29 CHRONIC BILATERAL LOW BACK PAIN WITHOUT SCIATICA: ICD-10-CM

## 2023-09-11 DIAGNOSIS — M54.9 MID BACK PAIN ON LEFT SIDE: Primary | ICD-10-CM

## 2023-09-27 ENCOUNTER — OFFICE VISIT (OUTPATIENT)
Dept: GASTROENTEROLOGY | Facility: MEDICAL CENTER | Age: 53
End: 2023-09-27
Payer: COMMERCIAL

## 2023-09-27 ENCOUNTER — TELEPHONE (OUTPATIENT)
Age: 53
End: 2023-09-27

## 2023-09-27 VITALS
TEMPERATURE: 98.6 F | SYSTOLIC BLOOD PRESSURE: 156 MMHG | HEART RATE: 52 BPM | WEIGHT: 173.2 LBS | HEIGHT: 62 IN | BODY MASS INDEX: 31.87 KG/M2 | DIASTOLIC BLOOD PRESSURE: 86 MMHG

## 2023-09-27 DIAGNOSIS — R10.13 EPIGASTRIC PAIN: Primary | ICD-10-CM

## 2023-09-27 DIAGNOSIS — K59.00 CONSTIPATION, UNSPECIFIED CONSTIPATION TYPE: ICD-10-CM

## 2023-09-27 PROCEDURE — 99214 OFFICE O/P EST MOD 30 MIN: CPT | Performed by: INTERNAL MEDICINE

## 2023-09-27 RX ORDER — LINACLOTIDE 290 UG/1
290 CAPSULE, GELATIN COATED ORAL
Qty: 30 CAPSULE | Refills: 3 | Status: SHIPPED | OUTPATIENT
Start: 2023-09-27

## 2023-09-27 NOTE — TELEPHONE ENCOUNTER
----- Message from Tanya Baldwin sent at 9/27/2023 11:13 AM EDT -----  [facility]  ----- Message -----  From: Ellis Mejia  Sent: 9/27/2023  11:07 AM EDT  To: [facility]      ----- Message -----  From: Keri Marrero MD  Sent: 9/27/2023  10:59 AM EDT  To: [facility]    Please contact patient's insurance for prior authorization for Emily Rivera.

## 2023-10-09 ENCOUNTER — OFFICE VISIT (OUTPATIENT)
Dept: FAMILY MEDICINE CLINIC | Facility: CLINIC | Age: 53
End: 2023-10-09
Payer: COMMERCIAL

## 2023-10-09 VITALS
DIASTOLIC BLOOD PRESSURE: 90 MMHG | TEMPERATURE: 97.5 F | HEART RATE: 61 BPM | HEIGHT: 62 IN | BODY MASS INDEX: 31.62 KG/M2 | SYSTOLIC BLOOD PRESSURE: 146 MMHG | WEIGHT: 171.8 LBS | OXYGEN SATURATION: 99 %

## 2023-10-09 DIAGNOSIS — I10 ESSENTIAL HYPERTENSION: Primary | ICD-10-CM

## 2023-10-09 DIAGNOSIS — K59.01 SLOW TRANSIT CONSTIPATION: ICD-10-CM

## 2023-10-09 DIAGNOSIS — B35.1 ONYCHOMYCOSIS OF LEFT GREAT TOE: ICD-10-CM

## 2023-10-09 DIAGNOSIS — B35.1 ONYCHOMYCOSIS OF RIGHT GREAT TOE: ICD-10-CM

## 2023-10-09 DIAGNOSIS — E66.9 OBESITY (BMI 30-39.9): ICD-10-CM

## 2023-10-09 DIAGNOSIS — E55.9 MILD VITAMIN D DEFICIENCY: ICD-10-CM

## 2023-10-09 DIAGNOSIS — E78.5 HYPERLIPIDEMIA, UNSPECIFIED HYPERLIPIDEMIA TYPE: ICD-10-CM

## 2023-10-09 PROCEDURE — 99214 OFFICE O/P EST MOD 30 MIN: CPT | Performed by: FAMILY MEDICINE

## 2023-10-09 RX ORDER — LISINOPRIL AND HYDROCHLOROTHIAZIDE 12.5; 1 MG/1; MG/1
1 TABLET ORAL DAILY
Qty: 30 TABLET | Refills: 5 | Status: SHIPPED | OUTPATIENT
Start: 2023-10-09

## 2023-10-09 NOTE — PATIENT INSTRUCTIONS
BP is elevated and will change lisinopril to lisinopril hct 10/12.5 and recheck BP in 1 month and get labs as directed. Low cholesterol diet encouraged and not taking cholesterol med. Patient is not taking her prescribed simvastatin as directed. Patient takes linzess as directed for constipation. Patient has onychomycosis right great toe.

## 2023-10-11 ENCOUNTER — APPOINTMENT (OUTPATIENT)
Dept: RADIOLOGY | Facility: MEDICAL CENTER | Age: 53
End: 2023-10-11
Payer: COMMERCIAL

## 2023-10-11 ENCOUNTER — OFFICE VISIT (OUTPATIENT)
Dept: OBGYN CLINIC | Facility: MEDICAL CENTER | Age: 53
End: 2023-10-11
Payer: COMMERCIAL

## 2023-10-11 VITALS — BODY MASS INDEX: 31.47 KG/M2 | HEIGHT: 62 IN | WEIGHT: 171 LBS

## 2023-10-11 DIAGNOSIS — M47.814 SPONDYLOSIS OF THORACIC SPINE: ICD-10-CM

## 2023-10-11 DIAGNOSIS — M54.2 CHRONIC NECK PAIN: Primary | ICD-10-CM

## 2023-10-11 DIAGNOSIS — M47.816 FACET ARTHROPATHY, LUMBAR: ICD-10-CM

## 2023-10-11 DIAGNOSIS — M54.50 CHRONIC BILATERAL LOW BACK PAIN WITHOUT SCIATICA: ICD-10-CM

## 2023-10-11 DIAGNOSIS — M50.30 DEGENERATIVE CERVICAL DISC: ICD-10-CM

## 2023-10-11 DIAGNOSIS — M54.2 CHRONIC NECK PAIN: ICD-10-CM

## 2023-10-11 DIAGNOSIS — G89.29 CHRONIC BILATERAL LOW BACK PAIN WITHOUT SCIATICA: ICD-10-CM

## 2023-10-11 DIAGNOSIS — M54.9 MID BACK PAIN ON LEFT SIDE: ICD-10-CM

## 2023-10-11 DIAGNOSIS — G89.29 CHRONIC NECK PAIN: Primary | ICD-10-CM

## 2023-10-11 DIAGNOSIS — G89.29 CHRONIC NECK PAIN: ICD-10-CM

## 2023-10-11 PROCEDURE — 72040 X-RAY EXAM NECK SPINE 2-3 VW: CPT

## 2023-10-11 PROCEDURE — 99204 OFFICE O/P NEW MOD 45 MIN: CPT | Performed by: EMERGENCY MEDICINE

## 2023-10-11 NOTE — PROGRESS NOTES
Assessment/Plan:    Diagnoses and all orders for this visit:    Chronic neck pain  -     XR spine cervical 2 or 3 vw injury; Future  -     Ambulatory Referral to Physical Therapy; Future    Mid back pain on left side  -     Ambulatory Referral to Orthopedic Surgery  -     Ambulatory Referral to Physical Therapy; Future    Chronic bilateral low back pain without sciatica  -     Ambulatory Referral to Orthopedic Surgery  -     Ambulatory Referral to Physical Therapy; Future    Degenerative cervical disc  -     Ambulatory Referral to Physical Therapy; Future    Spondylosis of thoracic spine  -     Ambulatory Referral to Physical Therapy; Future    Facet arthropathy, lumbar  -     Ambulatory Referral to Physical Therapy; Future    X-rays of the cervical spine were obtained today showing mild degenerative disc of C4-C5. I have reviewed prior x-rays of thoracic spine and MRI of the lumbar spine. Continue meloxicam as needed  We will hold off on Medrol Dosepak as patient states she has been placed on this before for her foot and noticed weight gain and increased blood sugars   start formal physical therapy for chronic neck and back pain if no improvement to consider referral to pain management      Return in about 7 weeks (around 11/29/2023). Subjective:   Patient ID: Ana Gil is a 52 y.o. female. 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 Most of her back pain is of the left mid back. She has been prescribed meloxicam and she has performed physical therapy in 2020, 2021 and 2022  She been placed on a Medrol Dosepak in the past for a foot issue and notes side effects including increased blood sugars and weight gain        Review of Systems    The following portions of the patient's chart were reviewed and updated as appropriate:    Allergy:  No Known Allergies    Medications:    Current Outpatient Medications:     cyclobenzaprine (FLEXERIL) 10 mg tablet, Take 1 tablet (10 mg total) by mouth daily as needed for muscle spasms, Disp: 14 tablet, Rfl: 0    linaCLOtide (Linzess) 290 MCG CAPS, Take 1 capsule by mouth daily before breakfast, Disp: 30 capsule, Rfl: 3    lisinopril-hydrochlorothiazide (PRINZIDE,ZESTORETIC) 10-12.5 MG per tablet, Take 1 tablet by mouth daily, Disp: 30 tablet, Rfl: 5    Magnesium 100 MG TABS, Take by mouth, Disp: , Rfl:     meloxicam (MOBIC) 7.5 mg tablet, Take 1 tablet (7.5 mg total) by mouth daily as needed for moderate pain, Disp: 30 tablet, Rfl: 0    Probiotic Product (PROBIOTIC DAILY PO), Take by mouth daily, Disp: , Rfl:     RA Vitamin D-3 125 MCG (5000 UT) capsule, Take 1 capsule (5,000 Units total) by mouth daily, Disp: 90 capsule, Rfl: 1    omeprazole (PriLOSEC) 40 MG capsule, Take 1 capsule (40 mg total) by mouth daily before breakfast (Patient not taking: Reported on 10/9/2023), Disp: 30 capsule, Rfl: 3    simvastatin (ZOCOR) 20 mg tablet, Take 1 tablet (20 mg total) by mouth daily at bedtime (Patient not taking: Reported on 10/9/2023), Disp: 90 tablet, Rfl: 3    solifenacin (VESICARE) 10 MG tablet, Take 1 tablet (10 mg total) by mouth daily (Patient not taking: Reported on 6/27/2023), Disp: 30 tablet, Rfl: 3    Patient Active Problem List   Diagnosis    Anxiety    Chronic neck pain    Chronic upper back pain    Constipation    Early satiety    Epigastric pain    Essential hypertension    First degree hemorrhoids    Headache    Palpitations    Herpes simplex infection    Hyperlipidemia    Mild vitamin D deficiency    Nocturnal leg cramps    Osteoarthritis    Ovarian cyst, complex    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    Overactive bladder    Fatigue    History of colonoscopy    Low ferritin    Slow transit constipation    H. pylori infection    Class 1 obesity due to excess calories with serious comorbidity and body mass index (BMI) of 32.0 to 32.9 in adult    Snoring    Pain in both feet    Foot pain    JOSUÉ (obstructive sleep apnea)    Enlarged uterus    Other microscopic hematuria    Chronic bilateral low back pain without sciatica    Asymptomatic spider veins of both lower extremities    Onychomycosis of right great toe       Objective:  Ht 5' 2" (1.575 m)   Wt 77.6 kg (171 lb)   LMP 07/02/2016   BMI 31.28 kg/m²     Back Exam     Tenderness   The patient is experiencing tenderness in the thoracic, lumbar and cervical.    Range of Motion   Extension:  abnormal   Flexion:  normal     Other   Gait: normal             Physical Exam      Neurologic Exam    Procedures    I have personally reviewed pertinent films in PACS. and I have personally reviewed the written report of the pertinent studies. Xrays T spine      Narrative & Impression   MRI LUMBAR SPINE WITHOUT CONTRAST     INDICATION: M54.50: Low back pain, unspecified  G89.29: Other chronic pain. Persistent left-sided low back pain. COMPARISON:  None. TECHNIQUE:  Sagittal T1, sagittal T2, sagittal inversion recovery, axial T1 and axial T2, coronal T2.    IMAGE QUALITY:  Diagnostic     FINDINGS:     VERTEBRAL BODIES:  There are 5 lumbar type vertebral bodies. Normal alignment of the lumbar spine. No spondylolysis or spondylolisthesis. No scoliosis. No compression fracture. Normal marrow signal is identified within the visualized bony   structures. No discrete marrow lesion. SACRUM:  Normal signal within the sacrum. No evidence of insufficiency or stress fracture. DISTAL CORD AND CONUS:  Normal size and signal within the distal cord and conus. Conus medullaris terminates at the L2-3 intervertebral disc space. PARASPINAL SOFT TISSUES:  Paraspinal soft tissues are unremarkable. LOWER THORACIC DISC SPACES:  Normal disc height and signal.  No disc herniation, canal stenosis or foraminal narrowing. LUMBAR DISC SPACES:     L1-L2:  Normal.     L2-L3:  Normal.     L3-L4:  Normal.     L4-L5:  There is bilateral facet hypertrophy.   Small left neural foraminal disc protrusion. Mild left neural foraminal narrowing. Central canal and right neural foramen patent. L5-S1:  There is a diffuse disk bulge. No significant central canal or neural foraminal narrowing. Bilateral facet hypertrophy noted. IMPRESSION:     Mild noncompressive lumbar degenerative change. Past Medical History:   Diagnosis Date    Hyperlipidemia     Hypertension     JOSUÉ (obstructive sleep apnea) 2020    Sinus pressure        Past Surgical History:   Procedure Laterality Date     SECTION, LOW TRANSVERSE      last assessed 8/20/15     LAPAROSCOPIC TOTAL HYSTERECTOMY  2017    LARYNX SURGERY      last assessed 8/20/15     MT COLONOSCOPY FLX DX W/COLLJ SPEC WHEN PFRMD N/A 3/26/2019    Procedure: COLONOSCOPY;  Surgeon: Guillermina Olivo MD;  Location: [facility]; Service: Gastroenterology    MT ESOPHAGOGASTRODUODENOSCOPY TRANSORAL DIAGNOSTIC N/A 3/26/2019    Procedure: ESOPHAGOGASTRODUODENOSCOPY (EGD); Surgeon: Guillermina Olivo MD;  Location: [facility];   Service: Gastroenterology    TUBAL LIGATION      last assessed 8/20/15       Social History     Socioeconomic History    Marital status: /Civil Union     Spouse name: Not on file    Number of children: Not on file    Years of education: Not on file    Highest education level: Not on file   Occupational History    Not on file   Tobacco Use    Smoking status: Never    Smokeless tobacco: Never   Vaping Use    Vaping Use: Never used   Substance and Sexual Activity    Alcohol use: No    Drug use: No    Sexual activity: Yes     Partners: Male     Birth control/protection: Female Sterilization   Other Topics Concern    Not on file   Social History Narrative    No preference on Restoration beliefs      Social Determinants of Health     Financial Resource Strain: High Risk (2022)    Overall Financial Resource Strain (CARDIA)     Difficulty of Paying Living Expenses: Hard   Food Insecurity: Food Insecurity Present (4/27/2022)    Hunger Vital Sign     Worried About Running Out of Food in the Last Year: Sometimes true     Ran Out of Food in the Last Year: Sometimes true   Transportation Needs: No Transportation Needs (4/27/2022)    PRAPARE - Transportation     Lack of Transportation (Medical): No     Lack of Transportation (Non-Medical):  No   Physical Activity: Not on file   Stress: Not on file   Social Connections: Not on file   Intimate Partner Violence: Not on file   Housing Stability: Not on file       Family History   Problem Relation Age of Onset    Hypertension Mother     Colon cancer Father     Multiple myeloma Sister

## 2023-10-27 ENCOUNTER — OFFICE VISIT (OUTPATIENT)
Dept: CARDIOLOGY CLINIC | Facility: CLINIC | Age: 53
End: 2023-10-27
Payer: COMMERCIAL

## 2023-10-27 VITALS
WEIGHT: 170.8 LBS | BODY MASS INDEX: 31.43 KG/M2 | HEIGHT: 62 IN | SYSTOLIC BLOOD PRESSURE: 126 MMHG | DIASTOLIC BLOOD PRESSURE: 90 MMHG | HEART RATE: 54 BPM

## 2023-10-27 DIAGNOSIS — R55 SYNCOPE, UNSPECIFIED SYNCOPE TYPE: Primary | ICD-10-CM

## 2023-10-27 DIAGNOSIS — I10 HYPERTENSION, UNSPECIFIED TYPE: ICD-10-CM

## 2023-10-27 DIAGNOSIS — R00.2 PALPITATIONS: ICD-10-CM

## 2023-10-27 PROCEDURE — 99214 OFFICE O/P EST MOD 30 MIN: CPT

## 2023-10-27 NOTE — PROGRESS NOTES
Cardiology   MD Mary Hancock MD, Lucila Canela DO, [facility], [facility] MD Davy Aldo, DO, Devin Schroeder DO, [facility]  -------------------------------------------------------------------  [facility] ORTHOPEDIC [facility]  [address] [place] [id number]  Phone: [phone]  Fax: [phone]  10/27/23  Omar Mcclain  YOB: 1970   MRN: [id number]      [place]erring Physician[place]  [id number] [facility] [address] [facility],  [address] [place] [address]     HPI: Omar Mcclain is a 52 y.o. female with:   Hypertension  Obstructive sleep apnea  Anxiety  Constipation  Epigastric pain  Obesity  Chronic back pain    She presents today for follow-up. She wore her Zio patch for about 3 days and this was negative for any significant arrhythmias. She cannot tolerate wearing it further due to a skin reaction to the adhesive. Since then she has not had any episode of syncope however. Blood pressure has been mildly elevated but she is in significant pain from her neck most of the time. Following with orthopedics, was recently recommended for physical therapy and to consider pain management if no significant improvement    Review of Systems   Constitutional:  Negative for chills and fever. HENT:  Negative for facial swelling and sore throat. Eyes:  Negative for visual disturbance. Respiratory:  Negative for cough, chest tightness, shortness of breath and wheezing. Cardiovascular:  Negative for chest pain, palpitations and leg swelling. Gastrointestinal:  Negative for abdominal pain, blood in stool, constipation, diarrhea, nausea and vomiting. Endocrine: Negative for cold intolerance and heat intolerance. Genitourinary:  Negative for decreased urine volume, difficulty urinating, dysuria and hematuria. Musculoskeletal:  Positive for arthralgias, back pain, neck pain and neck stiffness. Negative for myalgias.    Skin: Negative for rash. Neurological:  Negative for dizziness, syncope, weakness and numbness. Psychiatric/Behavioral:  Negative for agitation, behavioral problems and confusion. The patient is not nervous/anxious. OBJECTIVE  Vitals:    10/27/23 0824   BP: 126/90   Pulse: (!) 54       Physical Exam      EKG:  No results found for this visit on 10/27/23. IMPRESSION:  Hypertension  Obstructive sleep apnea  Anxiety  Constipation  Epigastric pain  Obesity  Chronic back pain    DISCUSSION/RECOMMENDATIONS:  Zio patch was negative for arrhythmias. No further episodes of syncope. Electrocardiogram is unremarkable. Echocardiogram in 2021 was without significant underlying structural heart disease to explain syncope. Her heart examination today is unremarkable. We will hold off on further work-up at this time, possibly vagal mediated and due to pain? Blood pressure is elevated but this is likely due to pain from her neck. Following with orthopedics and considering pain management referral after physical therapy evaluation. We will continue current antihypertensive medications lisinopril, hydrochlorothiazide    Christiano Sewell DO, [place], [facility]  --------------------------------------------------------------------------------  TREADMILL STRESS  No results found for this or any previous visit.     ----------------------------------------------------------------------------------------------  NUCLEAR STRESS TEST: No results found for this or any previous visit.     No results found for this or any previous visit.      --------------------------------------------------------------------------------  CATH:  No results found for this or any previous visit.    --------------------------------------------------------------------------------  ECHO:   Results for orders placed during the hospital encounter of 02/10/21    Echo complete with contrast if indicated    [facility]  [address] [facility], [facility]  [phone]    Transthoracic Echocardiogram  2D, M-mode, Doppler, and Color Doppler    Study date:  10-Feb-2021    Patient: Jason Maguire  MR number: [id number]  Account number: [de-identified]  : 1970  Age: 44 years  Gender: Female  Status: Outpatient  Location: [facility]  Height: 62 in  Weight: 174.7 lb  BP: 140/ 90 mmHg    Indications: Palpitations. Diagnoses: R00.2 - Palpitations    Sonographer:  MAGGI De Paz  Primary Physician:  Michael Mejia MD  Referring Physician:  Sotero Graham MD  Group:  [facility]  Interpreting Physician:  Dortha Severs, DO    SUMMARY    LEFT VENTRICLE:  Systolic function was normal. Ejection fraction was estimated to be 60 %. There were no regional wall motion abnormalities. LEFT ATRIUM:  The atrium was borderline dilated. MITRAL VALVE:  There was trace regurgitation. AORTA:  The root exhibited mild dilatation, measuring upto 3.7 cm (2.0 cm/m2) at the sinuses of valsalva. SUMMARY MEASUREMENTS  2D measurements:  Unspecified Anatomy:   %FS was 31.8 %. Ao Diam was 3 cm. Ao asc was 3.2 cm.  EDV(Teich) was 106.9 ml.  EF(Teich) was 59.8 %. ESV(Teich) was 43 ml. IVSd was 0.8 cm. LA Diam was 3.3 cm. LAAs A2C was 17.8 cm2. LAAs A4C was 18.7 cm2. LAESV A-L A2C was 52 ml. LAESV A-L A4C was 57.2 ml. LAESV Index (A-L) was 30.2 ml/m2. LAESV MOD A2C was 49.8 ml. LAESV MOD A4C was 54.7 ml. LAESV(A-L) was 54.6 ml. LAESV(MOD BP) was 52.2 ml. LAESVInd MOD BP was 28.8 ml/m2. LALs A2C  was 5.2 cm. LALs A4C was 5.2 cm. LVEDV MOD A4C was 58 ml. LVEF MOD A4C was 63.4 %. LVESV MOD A4C was 21.2 ml. LVIDd was 4.8 cm. LVIDs was 3.3 cm. LVLd A4C was 6.7 cm. LVLs A4C was 5.7 cm. LVPWd was 0.8 cm. RAAs A4C was 14.1 cm2. RAESV A-L was 34.3 ml.  RAESV MOD was 33.5 ml. RALs was 4.9 cm. RVIDd was 3.1 cm.   SV MOD A4C was 36.8 ml.  SV(Teich) was 63.9 ml.  MM measurements:  Unspecified Anatomy:   TAPSE was 2.3 cm. PW measurements:  Unspecified Anatomy:   E' Avg was 0.1 m/s. E' Lat was 0.1 m/s. E' Sept was 0.1 m/s. E/E' Avg was 7.1 . E/E' Lat was 6.8 . E/E' Sept was 7.4 . LVOT Env. Ti was 346.3 ms. LVOT VTI was 23.9 cm. LVOT Vmax was 1.1 m/s. LVOT Vmean was  0.7 m/s. LVOT maxPG was 4.7 mmHg. LVOT meanPG was 2.3 mmHg. MV A Andrew was 0.6 m/s. MV Dec [place] was 3.9 m/s2. MV DecT was 192.5 ms.  MV E Andrew was 0.7 m/s. MV E/A Ratio was 1.3 . HISTORY: PRIOR HISTORY: Patient has no history of cardiovascular disease. Risk factors: hypertension, diabetes, and medication-treated hypercholesterolemia. PROCEDURE: The study was performed in the [facility] Echo 3. This was a routine study. The transthoracic approach was used. The study included complete 2D imaging, M-mode, complete spectral Doppler, and color Doppler. The heart rate was 57 bpm, at  the start of the study. Image quality was adequate. LEFT VENTRICLE: Size was normal. Systolic function was normal. Ejection fraction was estimated to be 60 %. There were no regional wall motion abnormalities. Wall thickness was normal. No evidence of apical thrombus. DOPPLER: Left  ventricular diastolic function parameters were normal.    RIGHT VENTRICLE: The size was normal. Systolic function was normal. Wall thickness was normal.    LEFT ATRIUM: The atrium was borderline dilated. RIGHT ATRIUM: Size was normal.    MITRAL VALVE: Valve structure was normal. There was normal leaflet separation. DOPPLER: The transmitral velocity was within the normal range. There was no evidence for stenosis. There was trace regurgitation. AORTIC VALVE: The valve was trileaflet. Leaflets exhibited normal thickness and normal cuspal separation. DOPPLER: Transaortic velocity was within the normal range. There was no evidence for stenosis. There was no significant  regurgitation. TRICUSPID VALVE: The valve structure was normal. There was normal leaflet separation. DOPPLER: The transtricuspid velocity was within the normal range. There was no evidence for stenosis. There was no significant regurgitation. PULMONIC VALVE: Leaflets exhibited normal thickness, no calcification, and normal cuspal separation. DOPPLER: The transpulmonic velocity was within the normal range. There was no significant regurgitation. PERICARDIUM: There was no pericardial effusion. The pericardium was normal in appearance. AORTA: The root exhibited mild dilatation, measuring upto 3.7 cm (2.0 cm/m2) at the sinuses of valsalva. SYSTEMIC VEINS: IVC: The inferior vena cava was normal in size and course. The inferior vena cava was normal in size. Respirophasic changes were normal.    SYSTEM MEASUREMENT TABLES    2D  %FS: 31.8 %  Ao Diam: 3 cm  Ao asc: 3.2 cm  EDV(Teich): 106.9 ml  EF(Teich): 59.8 %  ESV(Teich): 43 ml  IVSd: 0.8 cm  LA Diam: 3.3 cm  LAAs A2C: 17.8 cm2  LAAs A4C: 18.7 cm2  LAESV A-L A2C: 52 ml  LAESV A-L A4C: 57.2 ml  LAESV Index (A-L): 30.2 ml/m2  LAESV MOD A2C: 49.8 ml  LAESV MOD A4C: 54.7 ml  LAESV(A-L): 54.6 ml  LAESV(MOD BP): 52.2 ml  LAESVInd MOD BP: 28.8 ml/m2  LALs A2C: 5.2 cm  LALs A4C: 5.2 cm  LVEDV MOD A4C: 58 ml  LVEF MOD A4C: 63.4 %  LVESV MOD A4C: 21.2 ml  LVIDd: 4.8 cm  LVIDs: 3.3 cm  LVLd A4C: 6.7 cm  LVLs A4C: 5.7 cm  LVPWd: 0.8 cm  RAAs A4C: 14.1 cm2  RAESV A-L: 34.3 ml  RAESV MOD: 33.5 ml  RALs: 4.9 cm  RVIDd: 3.1 cm  SV MOD A4C: 36.8 ml  SV(Teich): 63.9 ml    MM  TAPSE: 2.3 cm    PW  E' Av.1 m/s  E' Lat: 0.1 m/s  E' Sept: 0.1 m/s  E/E' Av.1  E/E' Lat: 6.8  E/E' Sept: 7.4  LVOT Env. Ti: 346.3 ms  LVOT VTI: 23.9 cm  LVOT Vmax: 1.1 m/s  LVOT Vmean: 0.7 m/s  LVOT maxP.7 mmHg  LVOT meanP.3 mmHg  MV A Andrew: 0.6 m/s  MV Dec [place]: 3.9 m/s2  MV DecT: 192.5 ms  MV E Andrew: 0.7 m/s  MV E/A Ratio: 1.3    [employer] Commission Accredited Echocardiography Laboratory    Prepared and electronically signed by    Jimy Simon DO  Signed 10-Feb-2021 15:54:18    No results found for this or any previous visit.    --------------------------------------------------------------------------------  HOLTER  No results found for this or any previous visit. No results found for this or any previous visit.    --------------------------------------------------------------------------------  CAROTIDS  No results found for this or any previous visit.     --------------------------------------------------------------------------------  Diagnoses and all orders for this visit:    Syncope, unspecified syncope type    Palpitations    Hypertension, unspecified type       ======================================================    Past Medical History:   Diagnosis Date   • Hyperlipidemia    • Hypertension    • JOSUÉ (obstructive sleep apnea) 2020   • Sinus pressure      Past Surgical History:   Procedure Laterality Date   •  SECTION, LOW TRANSVERSE      last assessed 8/20/15    • LAPAROSCOPIC TOTAL HYSTERECTOMY  2017   • LARYNX SURGERY      last assessed 8/20/15    • OK COLONOSCOPY FLX DX W/COLLJ SPEC WHEN PFRMD N/A 3/26/2019    Procedure: COLONOSCOPY;  Surgeon: Carmelina Salomon MD;  Location: [facility]; Service: Gastroenterology   • OK ESOPHAGOGASTRODUODENOSCOPY TRANSORAL DIAGNOSTIC N/A 3/26/2019    Procedure: ESOPHAGOGASTRODUODENOSCOPY (EGD); Surgeon: Carmelina Salomon MD;  Location: [facility];   Service: Gastroenterology   • TUBAL LIGATION      last assessed 8/20/15         Medications  Current Outpatient Medications   Medication Sig Dispense Refill   • cyclobenzaprine (FLEXERIL) 10 mg tablet Take 1 tablet (10 mg total) by mouth daily as needed for muscle spasms 14 tablet 0   • linaCLOtide (Linzess) 290 MCG CAPS Take 1 capsule by mouth daily before breakfast 30 capsule 3   • lisinopril-hydrochlorothiazide (PRINZIDE,ZESTORETIC) 10-12.5 MG per tablet Take 1 tablet by mouth daily 30 tablet 5   • Magnesium 100 MG TABS Take by mouth     • meloxicam (MOBIC) 7.5 mg tablet Take 1 tablet (7.5 mg total) by mouth daily as needed for moderate pain 30 tablet 0   • Probiotic Product (PROBIOTIC DAILY PO) Take by mouth daily     • RA Vitamin D-3 125 MCG (5000 UT) capsule Take 1 capsule (5,000 Units total) by mouth daily 90 capsule 1   • omeprazole (PriLOSEC) 40 MG capsule Take 1 capsule (40 mg total) by mouth daily before breakfast (Patient not taking: Reported on 10/9/2023) 30 capsule 3   • simvastatin (ZOCOR) 20 mg tablet Take 1 tablet (20 mg total) by mouth daily at bedtime (Patient not taking: Reported on 10/9/2023) 90 tablet 3   • solifenacin (VESICARE) 10 MG tablet Take 1 tablet (10 mg total) by mouth daily (Patient not taking: Reported on 6/27/2023) 30 tablet 3     No current facility-administered medications for this visit. No Known Allergies    Social History     Socioeconomic History   • Marital status: /Civil Union     Spouse name: Not on file   • Number of children: Not on file   • Years of education: Not on file   • Highest education level: Not on file   Occupational History   • Not on file   Tobacco Use   • Smoking status: Never   • Smokeless tobacco: Never   Vaping Use   • Vaping Use: Never used   Substance and Sexual Activity   • Alcohol use: No   • Drug use: No   • Sexual activity: Yes     Partners: Male     Birth control/protection: Female Sterilization   Other Topics Concern   • Not on file   Social History Narrative    No preference on Jainism beliefs      Social Determinants of Health     Financial Resource Strain: High Risk (4/27/2022)    Overall Financial Resource Strain (CARDIA)    • Difficulty of Paying Living Expenses: Hard   Food Insecurity: Food Insecurity Present (4/27/2022)    Hunger Vital Sign    • Worried About Running Out of Food in the Last Year: Sometimes true    • Ran Out of Food in the Last Year: Sometimes true   Transportation Needs: No Transportation Needs (4/27/2022)    PRAPARE - Transportation    • Lack of Transportation (Medical):  No    • Lack of Transportation (Non-Medical):  No   Physical Activity: Not on file   Stress: Not on file   Social Connections: Not on file   Intimate Partner Violence: Not on file   Housing Stability: Not on file        Family History   Problem Relation Age of Onset   • Hypertension Mother    • Colon cancer Father    • Multiple myeloma Sister        Lab Results   Component Value Date    WBC 4.06 (L) 08/24/2023    HGB 13.2 08/24/2023    HCT 42.2 08/24/2023    MCV 94 08/24/2023     08/24/2023      Lab Results   Component Value Date    SODIUM 140 08/24/2023    K 4.1 08/24/2023     08/24/2023    CO2 31 08/24/2023    BUN 20 08/24/2023    CREATININE 0.80 08/24/2023    GLUC 85 08/02/2016    CALCIUM 9.6 08/24/2023      Lab Results   Component Value Date    HGBA1C 5.6 03/24/2023      Lab Results   Component Value Date    CHOL 206 06/17/2015    CHOL 227 12/19/2014     Lab Results   Component Value Date    HDL 72 03/24/2023    HDL 73 04/25/2022    HDL 60 09/01/2021     Lab Results   Component Value Date    LDLCALC 149 (H) 03/24/2023    LDLCALC 156 (H) 04/25/2022    LDLCALC 169 (H) 09/01/2021     Lab Results   Component Value Date    TRIG 85 03/24/2023    TRIG 45 04/25/2022    TRIG 64 09/01/2021     No results found for: "CHOLHDL"   No results found for: "INR", "PROTIME"       Patient Active Problem List    Diagnosis Date Noted   • Onychomycosis of right great toe 10/09/2023   • Asymptomatic spider veins of both lower extremities 04/12/2023   • Chronic bilateral low back pain without sciatica 10/27/2021   • Other microscopic hematuria 05/17/2021   • Enlarged uterus 01/11/2021   • JOSUÉ (obstructive sleep apnea) 07/16/2020   • Foot pain 02/13/2020   • Snoring 01/14/2020   • Pain in both feet 01/14/2020   • Class 1 obesity due to excess calories with serious comorbidity and body mass index (BMI) of 32.0 to 32.9 in adult 11/17/2019   • H. pylori infection 06/17/2019   • Low ferritin 01/08/2019   • Slow transit constipation 01/08/2019   • Fatigue 10/08/2018   • History of colonoscopy 10/08/2018   • Overactive bladder 10/04/2018   • Anxiety 08/30/2017   • Chronic neck pain 08/30/2017   • Chronic upper back pain 08/30/2017   • Early satiety 12/28/2016   • First degree hemorrhoids 12/28/2016   • Constipation 10/14/2016   • Epigastric pain 10/14/2016   • Nocturnal leg cramps 07/28/2016   • Palpitations 03/25/2016   • Mild vitamin D deficiency 03/25/2016   • Ovarian cyst, complex 09/23/2015   • Headache 09/01/2015   • Hyperlipidemia 09/01/2015   • 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 07/06/2015   • Osteoarthritis 12/19/2014   • Essential hypertension 10/12/2012   • Herpes simplex infection 08/22/2012       Portions of the record may have been created with voice recognition software. Occasional wrong word or "sound a like" substitutions may have occurred due to the inherent limitations of voice recognition software. Read the chart carefully and recognize, using context, where substitutions have occurred.     Andrew Simpson DO, [facility]  10/27/2023 9:10 AM

## 2023-12-07 ENCOUNTER — VBI (OUTPATIENT)
Dept: ADMINISTRATIVE | Facility: OTHER | Age: 53
End: 2023-12-07

## 2025-01-16 ENCOUNTER — OFFICE VISIT (OUTPATIENT)
Dept: FAMILY MEDICINE CLINIC | Facility: CLINIC | Age: 55
End: 2025-01-16
Payer: COMMERCIAL

## 2025-01-16 VITALS
OXYGEN SATURATION: 99 % | TEMPERATURE: 96.7 F | WEIGHT: 177.2 LBS | HEIGHT: 62 IN | HEART RATE: 60 BPM | DIASTOLIC BLOOD PRESSURE: 72 MMHG | SYSTOLIC BLOOD PRESSURE: 120 MMHG | BODY MASS INDEX: 32.61 KG/M2

## 2025-01-16 DIAGNOSIS — M54.50 CHRONIC BILATERAL LOW BACK PAIN WITHOUT SCIATICA: ICD-10-CM

## 2025-01-16 DIAGNOSIS — E78.5 HYPERLIPIDEMIA, UNSPECIFIED HYPERLIPIDEMIA TYPE: ICD-10-CM

## 2025-01-16 DIAGNOSIS — Z00.00 HEALTH CARE MAINTENANCE: Primary | ICD-10-CM

## 2025-01-16 DIAGNOSIS — B35.1 ONYCHOMYCOSIS: ICD-10-CM

## 2025-01-16 DIAGNOSIS — Z12.31 ENCOUNTER FOR SCREENING MAMMOGRAM FOR BREAST CANCER: ICD-10-CM

## 2025-01-16 DIAGNOSIS — G89.29 CHRONIC BILATERAL LOW BACK PAIN WITHOUT SCIATICA: ICD-10-CM

## 2025-01-16 DIAGNOSIS — K21.9 GASTROESOPHAGEAL REFLUX DISEASE WITHOUT ESOPHAGITIS: ICD-10-CM

## 2025-01-16 DIAGNOSIS — I10 ESSENTIAL HYPERTENSION: ICD-10-CM

## 2025-01-16 DIAGNOSIS — E66.9 OBESITY (BMI 30-39.9): ICD-10-CM

## 2025-01-16 DIAGNOSIS — K59.00 CONSTIPATION, UNSPECIFIED CONSTIPATION TYPE: ICD-10-CM

## 2025-01-16 PROCEDURE — 99396 PREV VISIT EST AGE 40-64: CPT | Performed by: FAMILY MEDICINE

## 2025-01-16 RX ORDER — SIMVASTATIN 20 MG
20 TABLET ORAL
Qty: 90 TABLET | Refills: 3 | Status: SHIPPED | OUTPATIENT
Start: 2025-01-16

## 2025-01-16 RX ORDER — SIMVASTATIN 20 MG
20 TABLET ORAL
Qty: 90 TABLET | Refills: 3 | Status: SHIPPED | OUTPATIENT
Start: 2025-01-16 | End: 2025-01-16 | Stop reason: SDUPTHER

## 2025-01-16 NOTE — PATIENT INSTRUCTIONS
Here for general PE and needs to see GI for gerd and hx of constipation and would like to be checked for h/ pylori and would like to see GI. Rec seeing podiatry for b/l feet with onychomycosis. Rec eating healthy and exercising and take BP med. Patient to take cholesterol med as she stopped 2 months ago and was not taking recently. Low cholesterol diet encoutraged. See GYN and get mammograms as directed.

## 2025-01-16 NOTE — ASSESSMENT & PLAN NOTE
Lose weight as directed to gety BMI lower than 25    Orders:    Cortisol; Future    Lipid Panel with Direct LDL reflex; Future    TSH, 3rd generation; Future    T4, free; Future    Hemoglobin A1C

## 2025-01-16 NOTE — ASSESSMENT & PLAN NOTE
Orders:    Comprehensive metabolic panel; Future    Lipid Panel with Direct LDL reflex; Future    TSH, 3rd generation; Future    T4, free; Future    simvastatin (ZOCOR) 20 mg tablet; Take 1 tablet (20 mg total) by mouth daily at bedtime  restart cholesterol med and is noncompliant with statin in last 2 months

## 2025-01-16 NOTE — ASSESSMENT & PLAN NOTE
Consult GI for gerd and r/o H. Pylori   Orders:    CBC and differential; Future    Ambulatory Referral to Gastroenterology; Future

## 2025-01-16 NOTE — ASSESSMENT & PLAN NOTE
Stay well hydrated and rec GI consult and rec stool softeners and Linzess  Orders:    Ambulatory Referral to Gastroenterology; Future

## 2025-01-16 NOTE — ASSESSMENT & PLAN NOTE
Stable on med  Orders:    Comprehensive metabolic panel; Future    Lipid Panel with Direct LDL reflex; Future    TSH, 3rd generation; Future    T4, free; Future    Hemoglobin A1C    Magnesium; Future

## 2025-01-16 NOTE — PROGRESS NOTES
Adult Annual Physical  Name: Tawanna Verduzco      : 1970      MRN: [id number]  Encounter Provider: Erica Miller DO  Encounter Date: 2025   Encounter department: [facility]  Chief Complaint   Patient presents with    Annual Exam     Pt is requesting blood work and hormones tests.    Neck Pain     And also pain goes to her back per pt this is a on going issue is not getting better.     Patient Instructions   Here for general PE and needs to see GI for gerd and hx of constipation and would like to be checked for h/ pylori and would like to see GI. Rec seeing podiatry for b/l feet with onychomycosis. Rec eating healthy and exercising and take BP med. Patient to take cholesterol med as she stopped 2 months ago and was not taking recently. Low cholesterol diet encoutraged. See GYN and get mammograms as directed.     Assessment & Plan  Health care maintenance    Orders:    Cortisol; Future    Comprehensive metabolic panel; Future    CBC and differential; Future    Lipid Panel with Direct LDL reflex; Future    TSH, 3rd generation; Future    T4, free; Future    Hemoglobin A1C    Magnesium; Future    Hyperlipidemia, unspecified hyperlipidemia type    Orders:    Comprehensive metabolic panel; Future    Lipid Panel with Direct LDL reflex; Future    TSH, 3rd generation; Future    T4, free; Future    simvastatin (ZOCOR) 20 mg tablet; Take 1 tablet (20 mg total) by mouth daily at bedtime  restart cholesterol med and is noncompliant with statin in last 2 months  Essential hypertension  Stable on med  Orders:    Comprehensive metabolic panel; Future    Lipid Panel with Direct LDL reflex; Future    TSH, 3rd generation; Future    T4, free; Future    Hemoglobin A1C    Magnesium; Future    Gastroesophageal reflux disease without esophagitis  Consult GI for gerd and r/o H. Pylori   Orders:    CBC and differential; Future    Ambulatory Referral to Gastroenterology; Future    Encounter  for screening mammogram for breast cancer    Orders:    Mammo screening bilateral w 3d and cad; Future    Onychomycosis  Consult Podiatry   Orders:    Comprehensive metabolic panel; Future    Ambulatory Referral to Podiatry; Future    Chronic bilateral low back pain without sciatica  Stable, lose weight to get BMI lower than 25 to help        Constipation, unspecified constipation type  Stay well hydrated and rec GI consult and rec stool softeners and Linzess  Orders:    Ambulatory Referral to Gastroenterology; Future    Obesity (BMI 30-39.9)  Lose weight as directed to gety BMI lower than 25    Orders:    Cortisol; Future    Lipid Panel with Direct LDL reflex; Future    TSH, 3rd generation; Future    T4, free; Future    Hemoglobin A1C    Immunizations and preventive care screenings were discussed with patient today. Appropriate education was printed on patient's after visit summary.    Counseling:  Alcohol/drug use: discussed moderation in alcohol intake, the recommendations for healthy alcohol use, and avoidance of illicit drug use.  Dental Health: discussed importance of regular tooth brushing, flossing, and dental visits.  Injury prevention: discussed safety/seat belts, safety helmets, smoke detectors, carbon monoxide detectors, and smoking near bedding or upholstery.  Sexual health: discussed sexually transmitted diseases, partner selection, use of condoms, avoidance of unintended pregnancy, and contraceptive alternatives.  Exercise: the importance of regular exercise/physical activity was discussed. Recommend exercise 3-5 times per week for at least 30 minutes.       Depression Screening and Follow-up Plan: Patient was screened for depression during today's encounter. They screened negative with a PHQ-2 score of 0.        History of Present Illness     Adult Annual Physical:  Patient presents for annual physical. Patient is  and has 2 children and works online in cosmetics and does exercise and eats  healthy. Patient sees dentist and also would like to see podiatry for onychomycosis and GI for gerd symptoms and possible H. Pylori as well as constipation. Patient sleeps 8 hours per night. Uses sunscreen on face. .     Depression Screening:  - PHQ-2 Score: 0    Review of Systems   Constitutional: Negative.    HENT: Negative.     Eyes: Negative.    Respiratory: Negative.     Cardiovascular: Negative.    Gastrointestinal:         Gerd   Endocrine: Negative.    Genitourinary: Negative.    Musculoskeletal: Negative.    Skin:         Onychomycosis of feet    Allergic/Immunologic: Negative.    Neurological: Negative.    Hematological: Negative.    Psychiatric/Behavioral: Negative.       Current Outpatient Medications on File Prior to Visit   Medication Sig Dispense Refill    cyclobenzaprine (FLEXERIL) 10 mg tablet Take 1 tablet (10 mg total) by mouth daily as needed for muscle spasms 14 tablet 0    linaCLOtide (Linzess) 290 MCG CAPS Take 1 capsule by mouth daily before breakfast 30 capsule 3    lisinopril-hydrochlorothiazide (PRINZIDE,ZESTORETIC) 10-12.5 MG per tablet Take 1 tablet by mouth daily 30 tablet 5    Magnesium 100 MG TABS Take by mouth      meloxicam (MOBIC) 7.5 mg tablet Take 1 tablet (7.5 mg total) by mouth daily as needed for moderate pain 30 tablet 0    Probiotic Product (PROBIOTIC DAILY PO) Take by mouth daily      RA Vitamin D-3 125 MCG (5000 UT) capsule Take 1 capsule (5,000 Units total) by mouth daily 90 capsule 1    [DISCONTINUED] omeprazole (PriLOSEC) 40 MG capsule Take 1 capsule (40 mg total) by mouth daily before breakfast (Patient not taking: Reported on 1/16/2025) 30 capsule 3    [DISCONTINUED] simvastatin (ZOCOR) 20 mg tablet Take 1 tablet (20 mg total) by mouth daily at bedtime (Patient not taking: Reported on 10/9/2023) 90 tablet 3    [DISCONTINUED] solifenacin (VESICARE) 10 MG tablet Take 1 tablet (10 mg total) by mouth daily (Patient not taking: Reported on 6/27/2023) 30 tablet 3     No  "current facility-administered medications on file prior to visit.        Objective   /72 (BP Location: Left arm, Patient Position: Sitting, Cuff Size: Adult)   Pulse 60   Temp (!) 96.7 °F (35.9 °C) (Temporal)   Ht 5' 2\" (1.575 m)   Wt 80.4 kg (177 lb 3.2 oz)   LMP 07/02/2016   SpO2 99%   BMI 32.41 kg/m²     Physical Exam  Constitutional:       Appearance: She is well-developed. She is obese.   HENT:      Head: Normocephalic and atraumatic.      Right Ear: External ear normal.      Left Ear: External ear normal.      Nose: Nose normal.      Mouth/Throat:      Mouth: Mucous membranes are moist.   Eyes:      Conjunctiva/sclera: Conjunctivae normal.      Pupils: Pupils are equal, round, and reactive to light.   Cardiovascular:      Rate and Rhythm: Normal rate and regular rhythm.      Pulses: Normal pulses.      Heart sounds: Normal heart sounds.   Pulmonary:      Effort: Pulmonary effort is normal.      Breath sounds: Normal breath sounds.   Abdominal:      General: Abdomen is flat. Bowel sounds are normal.      Palpations: Abdomen is soft.   Musculoskeletal:         General: Normal range of motion.      Cervical back: Normal range of motion and neck supple.   Skin:     General: Skin is warm and dry.      Capillary Refill: Capillary refill takes less than 2 seconds.      Comments: Onychomycosis of feet   Neurological:      General: No focal deficit present.      Mental Status: She is alert and oriented to person, place, and time. Mental status is at baseline.      Deep Tendon Reflexes: Reflexes are normal and symmetric.   Psychiatric:         Mood and Affect: Mood normal.         Behavior: Behavior normal.         Thought Content: Thought content normal.         Judgment: Judgment normal.       Administrative Statements   I have spent a total time of 30 minutes in caring for this patient on the day of the visit/encounter including Diagnostic results, Prognosis, Risks and benefits of tx options, " Instructions for management, Patient and family education, Importance of tx compliance, Risk factor reductions, Impressions, Counseling / Coordination of care, Documenting in the medical record, Reviewing / ordering tests, medicine, procedures  , and Obtaining or reviewing history  .

## 2025-01-18 ENCOUNTER — APPOINTMENT (OUTPATIENT)
Dept: LAB | Facility: HOSPITAL | Age: 55
End: 2025-01-18
Payer: COMMERCIAL

## 2025-01-18 DIAGNOSIS — Z00.00 HEALTH CARE MAINTENANCE: ICD-10-CM

## 2025-01-18 DIAGNOSIS — E78.5 HYPERLIPIDEMIA, UNSPECIFIED HYPERLIPIDEMIA TYPE: ICD-10-CM

## 2025-01-18 DIAGNOSIS — I10 ESSENTIAL HYPERTENSION: ICD-10-CM

## 2025-01-18 DIAGNOSIS — K21.9 GASTROESOPHAGEAL REFLUX DISEASE WITHOUT ESOPHAGITIS: ICD-10-CM

## 2025-01-18 DIAGNOSIS — E66.9 OBESITY (BMI 30-39.9): ICD-10-CM

## 2025-01-18 DIAGNOSIS — B35.1 ONYCHOMYCOSIS: ICD-10-CM

## 2025-01-18 LAB
ALBUMIN SERPL BCG-MCNC: 4.1 G/DL (ref 3.5–5)
ALP SERPL-CCNC: 46 U/L (ref 34–104)
ALT SERPL W P-5'-P-CCNC: 27 U/L (ref 7–52)
ANION GAP SERPL CALCULATED.3IONS-SCNC: 5 MMOL/L (ref 4–13)
AST SERPL W P-5'-P-CCNC: 19 U/L (ref 13–39)
BASOPHILS # BLD AUTO: 0.03 THOUSANDS/ΜL (ref 0–0.1)
BASOPHILS NFR BLD AUTO: 1 % (ref 0–1)
BILIRUB SERPL-MCNC: 0.69 MG/DL (ref 0.2–1)
BUN SERPL-MCNC: 22 MG/DL (ref 5–25)
CALCIUM SERPL-MCNC: 9.6 MG/DL (ref 8.4–10.2)
CHLORIDE SERPL-SCNC: 106 MMOL/L (ref 96–108)
CHOLEST SERPL-MCNC: 252 MG/DL (ref ?–200)
CO2 SERPL-SCNC: 29 MMOL/L (ref 21–32)
CORTIS SERPL-MCNC: 17.1 UG/DL
CREAT SERPL-MCNC: 0.79 MG/DL (ref 0.6–1.3)
EOSINOPHIL # BLD AUTO: 0.11 THOUSAND/ΜL (ref 0–0.61)
EOSINOPHIL NFR BLD AUTO: 3 % (ref 0–6)
ERYTHROCYTE [DISTWIDTH] IN BLOOD BY AUTOMATED COUNT: 12.6 % (ref 11.6–15.1)
EST. AVERAGE GLUCOSE BLD GHB EST-MCNC: 123 MG/DL
GFR SERPL CREATININE-BSD FRML MDRD: 83 ML/MIN/1.73SQ M
GLUCOSE P FAST SERPL-MCNC: 101 MG/DL (ref 65–99)
HBA1C MFR BLD: 5.9 %
HCT VFR BLD AUTO: 42.4 % (ref 34.8–46.1)
HDLC SERPL-MCNC: 70 MG/DL
HGB BLD-MCNC: 13.6 G/DL (ref 11.5–15.4)
IMM GRANULOCYTES # BLD AUTO: 0.01 THOUSAND/UL (ref 0–0.2)
IMM GRANULOCYTES NFR BLD AUTO: 0 % (ref 0–2)
LDLC SERPL CALC-MCNC: 165 MG/DL (ref 0–100)
LYMPHOCYTES # BLD AUTO: 1.73 THOUSANDS/ΜL (ref 0.6–4.47)
LYMPHOCYTES NFR BLD AUTO: 42 % (ref 14–44)
MAGNESIUM SERPL-MCNC: 2.1 MG/DL (ref 1.9–2.7)
MCH RBC QN AUTO: 29.7 PG (ref 26.8–34.3)
MCHC RBC AUTO-ENTMCNC: 32.1 G/DL (ref 31.4–37.4)
MCV RBC AUTO: 93 FL (ref 82–98)
MONOCYTES # BLD AUTO: 0.53 THOUSAND/ΜL (ref 0.17–1.22)
MONOCYTES NFR BLD AUTO: 13 % (ref 4–12)
NEUTROPHILS # BLD AUTO: 1.68 THOUSANDS/ΜL (ref 1.85–7.62)
NEUTS SEG NFR BLD AUTO: 41 % (ref 43–75)
NRBC BLD AUTO-RTO: 0 /100 WBCS
PLATELET # BLD AUTO: 177 THOUSANDS/UL (ref 149–390)
PMV BLD AUTO: 12.9 FL (ref 8.9–12.7)
POTASSIUM SERPL-SCNC: 3.9 MMOL/L (ref 3.5–5.3)
PROT SERPL-MCNC: 7 G/DL (ref 6.4–8.4)
RBC # BLD AUTO: 4.58 MILLION/UL (ref 3.81–5.12)
SODIUM SERPL-SCNC: 140 MMOL/L (ref 135–147)
T4 FREE SERPL-MCNC: 0.83 NG/DL (ref 0.61–1.12)
TRIGL SERPL-MCNC: 83 MG/DL (ref ?–150)
TSH SERPL DL<=0.05 MIU/L-ACNC: 2.22 UIU/ML (ref 0.45–4.5)
WBC # BLD AUTO: 4.09 THOUSAND/UL (ref 4.31–10.16)

## 2025-01-18 PROCEDURE — 36415 COLL VENOUS BLD VENIPUNCTURE: CPT | Performed by: FAMILY MEDICINE

## 2025-01-18 PROCEDURE — 85025 COMPLETE CBC W/AUTO DIFF WBC: CPT

## 2025-01-18 PROCEDURE — 83036 HEMOGLOBIN GLYCOSYLATED A1C: CPT | Performed by: FAMILY MEDICINE

## 2025-01-18 PROCEDURE — 82533 TOTAL CORTISOL: CPT

## 2025-01-18 PROCEDURE — 80053 COMPREHEN METABOLIC PANEL: CPT

## 2025-01-18 PROCEDURE — 83735 ASSAY OF MAGNESIUM: CPT

## 2025-01-18 PROCEDURE — 84443 ASSAY THYROID STIM HORMONE: CPT

## 2025-01-18 PROCEDURE — 80061 LIPID PANEL: CPT

## 2025-01-18 PROCEDURE — 84439 ASSAY OF FREE THYROXINE: CPT

## 2025-01-19 ENCOUNTER — RESULTS FOLLOW-UP (OUTPATIENT)
Dept: FAMILY MEDICINE CLINIC | Facility: CLINIC | Age: 55
End: 2025-01-19

## 2025-01-22 NOTE — TELEPHONE ENCOUNTER
----- Message from Erica Miller DO sent at 1/21/2025 10:32 AM EST -----  I rec increasing her simvastatin to 40 mg once daily disp #30 with 6 refills and recheck cmp and lipids in 6 months.Call if any problems with new med dose such as muscle cramps.  ----- Message -----  From: Rachelle Cerrato MA  Sent: 1/21/2025  10:20 AM EST  To: Erica Miller DO    Pt takes her simvastatin everyday  ----- Message -----  From: Erica Miller DO  Sent: 1/19/2025  10:53 AM EST  To: [facility]    These labs are normal, please notify patient of normal results. Thyroid and cortisol level wnl.

## 2025-01-23 ENCOUNTER — OFFICE VISIT (OUTPATIENT)
Dept: GASTROENTEROLOGY | Facility: MEDICAL CENTER | Age: 55
End: 2025-01-23
Payer: COMMERCIAL

## 2025-01-23 VITALS
OXYGEN SATURATION: 98 % | BODY MASS INDEX: 32.57 KG/M2 | WEIGHT: 177 LBS | TEMPERATURE: 97.9 F | DIASTOLIC BLOOD PRESSURE: 96 MMHG | HEIGHT: 62 IN | SYSTOLIC BLOOD PRESSURE: 153 MMHG | HEART RATE: 60 BPM

## 2025-01-23 DIAGNOSIS — K59.00 CONSTIPATION, UNSPECIFIED CONSTIPATION TYPE: ICD-10-CM

## 2025-01-23 DIAGNOSIS — K21.9 GASTROESOPHAGEAL REFLUX DISEASE WITHOUT ESOPHAGITIS: ICD-10-CM

## 2025-01-23 DIAGNOSIS — R10.13 EPIGASTRIC PAIN: Primary | ICD-10-CM

## 2025-01-23 PROCEDURE — 99214 OFFICE O/P EST MOD 30 MIN: CPT | Performed by: NURSE PRACTITIONER

## 2025-01-23 RX ORDER — OMEPRAZOLE 40 MG/1
40 CAPSULE, DELAYED RELEASE ORAL DAILY
Qty: 30 CAPSULE | Refills: 4 | Status: SHIPPED | OUTPATIENT
Start: 2025-01-23

## 2025-01-23 RX ORDER — LINACLOTIDE 290 UG/1
290 CAPSULE, GELATIN COATED ORAL
Qty: 30 CAPSULE | Refills: 3 | Status: SHIPPED | OUTPATIENT
Start: 2025-01-23

## 2025-01-23 NOTE — PROGRESS NOTES
Name: Tawanna Verduzco      : 1970      MRN: [id number]  Encounter Provider: DERECK Clinton  Encounter Date: 2025   Encounter department: [facility]  :  Assessment & Plan  Gastroesophageal reflux disease without esophagitis  History of GERD that was controlled with 40 mg of omeprazole.  Patient stopped the omeprazole approximately 1 year ago and now reporting increased heartburn reflux symptoms.  She does get some epigastric discomfort postprandially.  She also gets some abdominal bloating.  Will restart omeprazole 40 mg daily but will check a stool for H. pylori before she restarts PPI.  Will also obtain a SIBO breath test due to epigastric pain and abdominal bloating.  Does use meloxicam as needed.  Rare alcohol use.  Rare spicy food use.  Occasional caffeine use.  Last EGD  noted hiatal hernia and gastritis.  Biopsies were negative for celiac and H. pylori.  Orders:    Ambulatory Referral to Gastroenterology    H. pylori antigen, stool; Future    omeprazole (PriLOSEC) 40 MG capsule; Take 1 capsule (40 mg total) by mouth daily  -SIBO breath test  -Follow-up in office in 3 to 4 months or sooner if needed  -Antireflux diet  Constipation, unspecified constipation type  Currently taking Linzess 290 mcg but not regularly.  She takes it as needed.  Reports her BMs are usually brown and formed but not always daily.  No melena or medic easy.  I did stress importance of taking Linzess on a daily basis.  Last colonoscopy was  which noted pancolonic diverticulosis, internal hemorrhoids and 1 polyp that was a tubular adenoma.    Orders:    Ambulatory Referral to Gastroenterology    linaCLOtide (Linzess) 290 MCG CAPS; Take 1 capsule by mouth daily before breakfast    Epigastric pain  Refer above  Orders:    omeprazole (PriLOSEC) 40 MG capsule; Take 1 capsule (40 mg total) by mouth daily    Small intestinal bacterial overgrowth        History of  Present Illness   HPI  Tawanna Verduzco is a 54 y.o. female who presents for follow-up.  Patient was last seen by Dr. Jaiyeola 9/23 for constipation and epigastric pain.  [employer] required today for translation as patient only speaks Tanzanian.    She has a history of chronic constipation and abdominal pain. She underwent EGD anoscopy recently which was overall unremarkable.  She was started on Linzess 290 mcg daily for her chronic constipation.    History of chronic GERD.  Symptoms have been controlled with omeprazole 40 mg daily but about a year ago.  He is reporting increased heartburn/reflux, some epigastric discomfort postprandially and some upper abdominal bloating.      No recent abdominal imaging to review.  Labs 1/25-CMP normal other than glucose 101, CBC normal other than WBCs 4.09, hemoglobin A1c 5.9, TSH 2.2.    Prior EGD/colonoscopy   2016 colonoscopy was normal   2016 EGD was normal with unremarkable gastric biopsies     2019 colonoscopy was normal with fair prep she was recommended repeat in 5 years  2019 EGD noted mild gastritis otherwise normal.  Gastric biopsies were positive for H. Pylori. Duodenal biopsy showed focal increased intraepithelial lymphocytes suggestive of Marsh 1 celiac disease.  Subsequent 2019 celiac antibody profile was negative  Colon 8/23- severe pancolonic melanosis, 1 sessile polyp, internal hemorrhoids. Repeat colon in 5 years. Bx TA.     EGD 8/23- small hiatal hernia, gastritis. Neg for Celaic and H pylori.    History obtained from: patient    Review of Systems   Gastrointestinal:  Positive for abdominal pain and constipation.   All other systems reviewed and are negative.    Medical History Reviewed by provider this encounter:  Tobacco  Allergies  Meds  Problems  Med Hx  Surg Hx  Fam Hx     .  Current Outpatient Medications on File Prior to Visit   Medication Sig Dispense Refill    cyclobenzaprine (FLEXERIL) 10 mg tablet Take 1 tablet (10 mg total) by mouth daily  as needed for muscle spasms 14 tablet 0    linaCLOtide (Linzess) 290 MCG CAPS Take 1 capsule by mouth daily before breakfast 30 capsule 3    lisinopril-hydrochlorothiazide (PRINZIDE,ZESTORETIC) 10-12.5 MG per tablet Take 1 tablet by mouth daily 30 tablet 5    Magnesium 100 MG TABS Take by mouth      meloxicam (MOBIC) 7.5 mg tablet Take 1 tablet (7.5 mg total) by mouth daily as needed for moderate pain 30 tablet 0    Probiotic Product (PROBIOTIC DAILY PO) Take by mouth daily      RA Vitamin D-3 125 MCG (5000 UT) capsule Take 1 capsule (5,000 Units total) by mouth daily 90 capsule 1    simvastatin (ZOCOR) 20 mg tablet Take 1 tablet (20 mg total) by mouth daily at bedtime 90 tablet 3     No current facility-administered medications on file prior to visit.      Social History     Tobacco Use    Smoking status: Never    Smokeless tobacco: Never   Vaping Use    Vaping status: Never Used   Substance and Sexual Activity    Alcohol use: No    Drug use: No    Sexual activity: Yes     Partners: Male     Birth control/protection: Female Sterilization        Objective   LMP 07/02/2016      Physical Exam  Pulmonary:      Breath sounds: Normal breath sounds.   Abdominal:      General: Bowel sounds are normal.      Palpations: Abdomen is soft.      Tenderness: There is no abdominal tenderness.   Skin:     General: Skin is warm and dry.   Neurological:      Mental Status: She is alert and oriented to person, place, and time.

## 2025-01-23 NOTE — ASSESSMENT & PLAN NOTE
History of GERD that was controlled with 40 mg of omeprazole.  Patient stopped the omeprazole approximately 1 year ago and now reporting increased heartburn reflux symptoms.  She does get some epigastric discomfort postprandially.  She also gets some abdominal bloating.  Will restart omeprazole 40 mg daily but will check a stool for H. pylori before she restarts PPI.  Will also obtain a SIBO breath test due to epigastric pain and abdominal bloating.  Does use meloxicam as needed.  Rare alcohol use.  Rare spicy food use.  Occasional caffeine use.  Last EGD 8/23 noted hiatal hernia and gastritis.  Biopsies were negative for celiac and H. pylori.  Orders:    Ambulatory Referral to Gastroenterology    H. pylori antigen, stool; Future    omeprazole (PriLOSEC) 40 MG capsule; Take 1 capsule (40 mg total) by mouth daily  -SIBO breath test  -Follow-up in office in 3 to 4 months or sooner if needed  -Antireflux diet

## 2025-01-23 NOTE — ASSESSMENT & PLAN NOTE
Refer above  Orders:    omeprazole (PriLOSEC) 40 MG capsule; Take 1 capsule (40 mg total) by mouth daily    Small intestinal bacterial overgrowth

## 2025-01-23 NOTE — ASSESSMENT & PLAN NOTE
Currently taking Linzess 290 mcg but not regularly.  She takes it as needed.  Reports her BMs are usually brown and formed but not always daily.  No melena or medic easy.  I did stress importance of taking Linzess on a daily basis.  Last colonoscopy was 8/23 which noted pancolonic diverticulosis, internal hemorrhoids and 1 polyp that was a tubular adenoma.    Orders:    Ambulatory Referral to Gastroenterology    linaCLOtide (Linzess) 290 MCG CAPS; Take 1 capsule by mouth daily before breakfast

## 2025-01-24 ENCOUNTER — TELEPHONE (OUTPATIENT)
Age: 55
End: 2025-01-24

## 2025-01-24 NOTE — TELEPHONE ENCOUNTER
PA for LINZESS SUBMITTED to [employer]    via    [x]CMM-KEY: [id number]  []Surescripts-Case ID #   []Availity-Auth ID # NDC #   []Faxed to plan   []Other website   []Phone call Case ID #     [x]PA sent as URGENT    All office notes, labs and other pertaining documents and studies sent. Clinical questions answered. Awaiting determination from insurance company.     Turnaround time for your insurance to make a decision on your Prior Authorization can take 7-21 business days.

## 2025-01-27 ENCOUNTER — TELEPHONE (OUTPATIENT)
Age: 55
End: 2025-01-27

## 2025-01-27 DIAGNOSIS — K59.04 CHRONIC IDIOPATHIC CONSTIPATION: Primary | ICD-10-CM

## 2025-01-27 RX ORDER — LUBIPROSTONE 24 UG/1
24 CAPSULE ORAL 2 TIMES DAILY WITH MEALS
Qty: 60 CAPSULE | Refills: 3 | Status: SHIPPED | OUTPATIENT
Start: 2025-01-27

## 2025-01-27 NOTE — TELEPHONE ENCOUNTER
Please call patient tell her that I ordered another medication that hopefully will be approved.  It is Amitiza 24 mcg twice daily.  Thanks.

## 2025-01-27 NOTE — TELEPHONE ENCOUNTER
PA for Linzess 290 mcg   DENIED    Reason:(Screenshot if applicable)        Message sent to office clinical pool Yes    Denial letter scanned into Media Yes    Appeal started No (Provider will need to decide if appeal is warranted and send clinical documentation to Prior Authorization Team for initiation.)    **Please follow up with your patient regarding denial and next steps**

## 2025-01-27 NOTE — TELEPHONE ENCOUNTER
PA for AMITZA SUBMITTED to [employer]    via    [x]CMM-KEY:   []Surescripts-Case ID #   []Availity-Auth ID # NDC #   []Faxed to plan   []Other website   []Phone call Case ID #     [x]PA sent as URGENT    All office notes, labs and other pertaining documents and studies sent. Clinical questions answered. Awaiting determination from insurance company.     Turnaround time for your insurance to make a decision on your Prior Authorization can take 7-21 business days.

## 2025-01-28 NOTE — TELEPHONE ENCOUNTER
Recvd fax Amitiza deneid.  Scanned in media  PA was for brand name.  Resubmitting for lubiprostone.    PA for lubiprostone 24 mcg    SUBMITTED to [employer]    via    [x]CMM-KEY: [id number]    All office notes, labs and other pertaining documents and studies sent. Clinical questions answered. Awaiting determination from insurance company.     Turnaround time for your insurance to make a decision on your Prior Authorization can take 7-21 business days.

## 2025-01-29 ENCOUNTER — TELEPHONE (OUTPATIENT)
Dept: GASTROENTEROLOGY | Facility: MEDICAL CENTER | Age: 55
End: 2025-01-29

## 2025-01-29 NOTE — TELEPHONE ENCOUNTER
PA for lubiprostone APPROVED     Date(s) approved uttio 1/28/2026        Patient advised by          []MyChart Message  []Phone call   [x]LMOM  []L/M to call office as no active Communication consent on file  []Unable to leave detailed message as VM not approved on Communication consent       Pharmacy advised by    [x]Fax  []Phone call    Approval letter scanned into Media Yes

## 2025-01-30 ENCOUNTER — APPOINTMENT (OUTPATIENT)
Dept: LAB | Facility: HOSPITAL | Age: 55
End: 2025-01-30
Payer: COMMERCIAL

## 2025-01-30 DIAGNOSIS — K21.9 GASTROESOPHAGEAL REFLUX DISEASE WITHOUT ESOPHAGITIS: ICD-10-CM

## 2025-01-30 PROCEDURE — 87338 HPYLORI STOOL AG IA: CPT

## 2025-01-31 ENCOUNTER — RESULTS FOLLOW-UP (OUTPATIENT)
Dept: GASTROENTEROLOGY | Facility: MEDICAL CENTER | Age: 55
End: 2025-01-31

## 2025-01-31 LAB — H PYLORI AG STL QL IA: NEGATIVE

## 2025-02-04 ENCOUNTER — OFFICE VISIT (OUTPATIENT)
Dept: GASTROENTEROLOGY | Facility: CLINIC | Age: 55
End: 2025-02-04
Payer: COMMERCIAL

## 2025-02-04 DIAGNOSIS — R14.0 BLOATING: ICD-10-CM

## 2025-02-04 DIAGNOSIS — K59.00 CONSTIPATION, UNSPECIFIED CONSTIPATION TYPE: Primary | ICD-10-CM

## 2025-02-04 PROCEDURE — PBNCHG PB NO CHARGE PLACEHOLDER: Performed by: INTERNAL MEDICINE

## 2025-02-04 PROCEDURE — 91065 BREATH HYDROGEN/METHANE TEST: CPT | Performed by: INTERNAL MEDICINE

## 2025-02-04 NOTE — PROGRESS NOTES
[facility]       Bacterial Overgrowth Analytical Record    Tawanna Verduzco 54 y.o. female MRN: [id number]      Date of Test: 1/29/25    Substrate Given: Lactulose    Ordering Provider: Ashanti Xavier    Medical Assistant: Padmaja FIORE    Symptoms: constipation and bloating    The patient presents for bacterial overgrowth testing.    Patient fasted overnight. Baseline readings obtained.   Breath test performed every 20 min for a total of 3 hr    Sample Clock Time ppmH2 ppmCH4 Co2% K[place]   Baseline 8:22a   2 4 0.0 Too High    #1  20 minutes 9:48a 5 8 3.4 1.61   #2  40 minutes 10:08a 4 6 4.4 1.25   #3  60 minutes 10:29a 9 9 4.1 1.34   #4  80 minutes 10:50a 13 11 2.9 1.89   #5  100 minutes 11:10a 13 10 3.9 1.41   #6  120 minutes 11:30a 14 8 4.3 1.27   #7  140 minutes 11:50a 24 10 3.7 1.48   #8  160 minutes 12:10a 31 14 3.5 1.57   #9  180 minutes 12:30a 18 11 3.1 1.77       Physician interpretation: Breath test results are not suggestive of small intestinal bacterial overgrowth.  I will forward these results to Ashanti Xavier to discuss with patient further evaluation and management.

## 2025-04-14 ENCOUNTER — OFFICE VISIT (OUTPATIENT)
Dept: PODIATRY | Facility: CLINIC | Age: 55
End: 2025-04-14
Payer: COMMERCIAL

## 2025-04-14 VITALS — WEIGHT: 177 LBS | HEIGHT: 62 IN | BODY MASS INDEX: 32.57 KG/M2

## 2025-04-14 DIAGNOSIS — B35.1 TINEA UNGUIUM: Primary | ICD-10-CM

## 2025-04-14 PROCEDURE — 99204 OFFICE O/P NEW MOD 45 MIN: CPT | Performed by: PODIATRIST

## 2025-04-14 RX ORDER — TERBINAFINE HYDROCHLORIDE 250 MG/1
250 TABLET ORAL DAILY
Qty: 14 TABLET | Refills: 2 | Status: SHIPPED | OUTPATIENT
Start: 2025-04-14 | End: 2025-05-26

## 2025-04-14 NOTE — PROGRESS NOTES
"Name: Tawanna Verduzco      : 1970      MRN: [id number]  Encounter Provider: Solitario Erwin DPM  Encounter Date: 2025   Encounter department: [facility]  :  Assessment & Plan  Tinea unguium  Discussed oral lamisil vs topical treatments for fungal toenails. Patient clearly told to abstain from any alcohol use while taking oral lamisil (terbinafine). Call with any GI distress or unusual side effects while taking this medication. Will pulse dose the medication to reduce hepatic risk.     Reviewed bloodwork, last CMP did not show any liver concerns. Patient instructed not to drink any alcohol while taking this medication    Patient to take 1 pill daily for 2 weeks (14 days) then stop for 2 weeks. Repeat this cycle two more times.     Reviewed PCP visit 2025  Reviewed recent CMP, normal liver enzymes  Orders:  •  terbinafine (LamISIL) 250 mg tablet; Take 1 tablet (250 mg total) by mouth daily take 1 pill daily for 2 weeks (14 days) then stop for 2 weeks. Repeat this cycle two more times.        History of Present Illness   HPI  Tawanna Verduzco is a 55 y.o. female who presents with a fungus in her left great toe. She tried some topical medications but they didn't work.       Review of Systems  As stated in HPI, otherwise normal    Medical History Reviewed by provider this encounter:  Tobacco  Allergies  Meds  Problems  Med Hx  Surg Hx  Fam Hx           Objective   Ht 5' 2\" (1.575 m)   Wt 80.3 kg (177 lb)   LMP 2016   BMI 32.37 kg/m²      Physical Exam      "

## 2025-07-17 ENCOUNTER — APPOINTMENT (OUTPATIENT)
Dept: LAB | Facility: CLINIC | Age: 55
End: 2025-07-17
Attending: FAMILY MEDICINE
Payer: COMMERCIAL

## 2025-07-17 ENCOUNTER — OFFICE VISIT (OUTPATIENT)
Dept: FAMILY MEDICINE CLINIC | Facility: CLINIC | Age: 55
End: 2025-07-17
Payer: COMMERCIAL

## 2025-07-17 VITALS
SYSTOLIC BLOOD PRESSURE: 130 MMHG | OXYGEN SATURATION: 97 % | BODY MASS INDEX: 34.04 KG/M2 | TEMPERATURE: 95.9 F | DIASTOLIC BLOOD PRESSURE: 84 MMHG | HEIGHT: 62 IN | WEIGHT: 185 LBS | HEART RATE: 69 BPM

## 2025-07-17 DIAGNOSIS — R73.03 PREDIABETES: ICD-10-CM

## 2025-07-17 DIAGNOSIS — E78.5 HYPERLIPIDEMIA, UNSPECIFIED HYPERLIPIDEMIA TYPE: ICD-10-CM

## 2025-07-17 DIAGNOSIS — K21.9 GASTROESOPHAGEAL REFLUX DISEASE WITHOUT ESOPHAGITIS: ICD-10-CM

## 2025-07-17 DIAGNOSIS — E66.9 OBESITY (BMI 30-39.9): ICD-10-CM

## 2025-07-17 DIAGNOSIS — I10 ESSENTIAL HYPERTENSION: ICD-10-CM

## 2025-07-17 DIAGNOSIS — Z12.31 ENCOUNTER FOR SCREENING MAMMOGRAM FOR BREAST CANCER: Primary | ICD-10-CM

## 2025-07-17 LAB
ALBUMIN SERPL BCG-MCNC: 3.9 G/DL (ref 3.5–5)
ALP SERPL-CCNC: 48 U/L (ref 34–104)
ALT SERPL W P-5'-P-CCNC: 15 U/L (ref 7–52)
ANION GAP SERPL CALCULATED.3IONS-SCNC: 7 MMOL/L (ref 4–13)
AST SERPL W P-5'-P-CCNC: 18 U/L (ref 13–39)
BASOPHILS # BLD AUTO: 0.03 THOUSANDS/ÂΜL (ref 0–0.1)
BASOPHILS NFR BLD AUTO: 1 % (ref 0–1)
BILIRUB SERPL-MCNC: 0.35 MG/DL (ref 0.2–1)
BUN SERPL-MCNC: 24 MG/DL (ref 5–25)
CALCIUM SERPL-MCNC: 9.5 MG/DL (ref 8.4–10.2)
CHLORIDE SERPL-SCNC: 105 MMOL/L (ref 96–108)
CHOLEST SERPL-MCNC: 225 MG/DL (ref ?–200)
CO2 SERPL-SCNC: 29 MMOL/L (ref 21–32)
CREAT SERPL-MCNC: 0.85 MG/DL (ref 0.6–1.3)
EOSINOPHIL # BLD AUTO: 0.12 THOUSAND/ÂΜL (ref 0–0.61)
EOSINOPHIL NFR BLD AUTO: 3 % (ref 0–6)
ERYTHROCYTE [DISTWIDTH] IN BLOOD BY AUTOMATED COUNT: 12.1 % (ref 11.6–15.1)
EST. AVERAGE GLUCOSE BLD GHB EST-MCNC: 123 MG/DL
GFR SERPL CREATININE-BSD FRML MDRD: 76 ML/MIN/1.73SQ M
GLUCOSE P FAST SERPL-MCNC: 87 MG/DL (ref 65–99)
HBA1C MFR BLD: 5.9 %
HCT VFR BLD AUTO: 40.7 % (ref 34.8–46.1)
HDLC SERPL-MCNC: 59 MG/DL
HGB BLD-MCNC: 13 G/DL (ref 11.5–15.4)
IMM GRANULOCYTES # BLD AUTO: 0.01 THOUSAND/UL (ref 0–0.2)
IMM GRANULOCYTES NFR BLD AUTO: 0 % (ref 0–2)
LDLC SERPL CALC-MCNC: 147 MG/DL (ref 0–100)
LYMPHOCYTES # BLD AUTO: 1.7 THOUSANDS/ÂΜL (ref 0.6–4.47)
LYMPHOCYTES NFR BLD AUTO: 43 % (ref 14–44)
MCH RBC QN AUTO: 30 PG (ref 26.8–34.3)
MCHC RBC AUTO-ENTMCNC: 31.9 G/DL (ref 31.4–37.4)
MCV RBC AUTO: 94 FL (ref 82–98)
MONOCYTES # BLD AUTO: 0.42 THOUSAND/ÂΜL (ref 0.17–1.22)
MONOCYTES NFR BLD AUTO: 11 % (ref 4–12)
NEUTROPHILS # BLD AUTO: 1.67 THOUSANDS/ÂΜL (ref 1.85–7.62)
NEUTS SEG NFR BLD AUTO: 42 % (ref 43–75)
NRBC BLD AUTO-RTO: 0 /100 WBCS
PLATELET # BLD AUTO: 159 THOUSANDS/UL (ref 149–390)
PMV BLD AUTO: 13.1 FL (ref 8.9–12.7)
POTASSIUM SERPL-SCNC: 3.9 MMOL/L (ref 3.5–5.3)
PROT SERPL-MCNC: 6.8 G/DL (ref 6.4–8.4)
RBC # BLD AUTO: 4.34 MILLION/UL (ref 3.81–5.12)
SODIUM SERPL-SCNC: 141 MMOL/L (ref 135–147)
TRIGL SERPL-MCNC: 94 MG/DL (ref ?–150)
WBC # BLD AUTO: 3.95 THOUSAND/UL (ref 4.31–10.16)

## 2025-07-17 PROCEDURE — 99214 OFFICE O/P EST MOD 30 MIN: CPT | Performed by: FAMILY MEDICINE

## 2025-07-17 PROCEDURE — 80061 LIPID PANEL: CPT

## 2025-07-17 PROCEDURE — 85025 COMPLETE CBC W/AUTO DIFF WBC: CPT

## 2025-07-17 PROCEDURE — 80053 COMPREHEN METABOLIC PANEL: CPT

## 2025-07-17 PROCEDURE — 36415 COLL VENOUS BLD VENIPUNCTURE: CPT

## 2025-07-17 PROCEDURE — 83036 HEMOGLOBIN GLYCOSYLATED A1C: CPT

## 2025-07-17 NOTE — PROGRESS NOTES
Name: Tawanna Verduzco      : 1970      MRN: [id number]  Encounter Provider: Erica Miller DO  Encounter Date: 2025   Encounter department: [facility] PRIMARY CARE  Chief Complaint   Patient presents with   • Follow-up     6 month f/u     Patient Instructions   BP stable. Low cholesterol and low sugar diet and rec exercising and also rec gerd precautions. Take gerd med as directed. Lose weight as directed to get BMI lower than 25. Recheck labs.     Assessment & Plan  Encounter for screening mammogram for breast cancer  Check mammogram  Orders:  •  Mammo screening bilateral w 3d and cad; Future    Essential hypertension  stable       Hyperlipidemia, unspecified hyperlipidemia type  Low cholesterol diet  Orders:  •  Comprehensive metabolic panel; Future  •  Lipid Panel with Direct LDL reflex; Future    Gastroesophageal reflux disease without esophagitis  stable  Orders:  •  CBC and differential; Future    Prediabetes  Low sugar diet and exercise and refilled test strips  Orders:  •  glucose blood test strip; Use 1 each daily Freestyle lite test stripts- test once hummel  •  Comprehensive metabolic panel; Future  •  Hemoglobin A1C; Future    Obesity (BMI 30-39.9)  Lose weight to get Bmi lower than 25    Orders:  •  Comprehensive metabolic panel; Future  •  Hemoglobin A1C; Future  •  Lipid Panel with Direct LDL reflex; Future      Depression Screening and Follow-up Plan: Patient was screened for depression during today's encounter. They screened negative with a PHQ-2 score of 0.          History of Present Illness     Here for BP check and also is taking BP med as directed.       Review of Systems   Constitutional: Negative.    HENT: Negative.     Eyes: Negative.    Respiratory: Negative.     Cardiovascular: Negative.    Gastrointestinal: Negative.    Endocrine: Negative.    Genitourinary: Negative.    Musculoskeletal: Negative.    Skin: Negative.    Allergic/Immunologic: Negative.   "  Neurological: Negative.    Hematological: Negative.    Psychiatric/Behavioral: Negative.       Past Medical History[1]  Past Surgical History[1]  Family History[1]  Social History[1]  Medications[1]  No Known Allergies  Immunization History   Administered Date(s) Administered   • Td (adult), Unspecified 10/07/2005     Objective   /84 (BP Location: Left arm, Patient Position: Sitting)   Pulse 69   Temp (!) 95.9 °F (35.5 °C) (Tympanic)   Ht 5' 1.5\" (1.562 m)   Wt 83.9 kg (185 lb)   LMP 07/02/2016   SpO2 97%   Breastfeeding No   BMI 34.39 kg/m²     Physical Exam  Constitutional:       Appearance: She is well-developed. She is obese.   HENT:      Head: Normocephalic and atraumatic.      Right Ear: External ear normal.      Left Ear: External ear normal.      Nose: Nose normal.      Mouth/Throat:      Mouth: Mucous membranes are moist.     Eyes:      Conjunctiva/sclera: Conjunctivae normal.      Pupils: Pupils are equal, round, and reactive to light.       Cardiovascular:      Rate and Rhythm: Normal rate and regular rhythm.      Pulses: Normal pulses.      Heart sounds: Normal heart sounds.   Pulmonary:      Effort: Pulmonary effort is normal.      Breath sounds: Normal breath sounds.     Musculoskeletal:         General: Normal range of motion.      Cervical back: Normal range of motion and neck supple.     Skin:     General: Skin is warm and dry.      Capillary Refill: Capillary refill takes less than 2 seconds.     Neurological:      General: No focal deficit present.      Mental Status: She is alert and oriented to person, place, and time. Mental status is at baseline.      Deep Tendon Reflexes: Reflexes are normal and symmetric.     Psychiatric:         Mood and Affect: Mood normal.         Behavior: Behavior normal.         Thought Content: Thought content normal.         Judgment: Judgment normal.       Administrative Statements   I have spent a total time of 32 minutes in caring for this patient " on the day of the visit/encounter including Diagnostic results, Prognosis, Risks and benefits of tx options, Instructions for management, Patient and family education, Importance of tx compliance, Risk factor reductions, Impressions, Counseling / Coordination of care, Documenting in the medical record, Reviewing/placing orders in the medical record (including tests, medications, and/or procedures), and Obtaining or reviewing history  .       [1]  Past Medical History:  Diagnosis Date   • Hyperlipidemia    • Hypertension    • JOSUÉ (obstructive sleep apnea) 2020   • Sinus pressure    [1]  Past Surgical History:  Procedure Laterality Date   •  SECTION, LOW TRANSVERSE      last assessed 8/20/15    • LAPAROSCOPIC TOTAL HYSTERECTOMY  2017   • LARYNX SURGERY      last assessed 8/20/15    • KS COLONOSCOPY FLX DX W/COLLJ SPEC WHEN PFRMD N/A 3/26/2019    Procedure: COLONOSCOPY;  Surgeon: Roberto Helms MD;  Location: [facility];  Service: Gastroenterology   • KS ESOPHAGOGASTRODUODENOSCOPY TRANSORAL DIAGNOSTIC N/A 3/26/2019    Procedure: ESOPHAGOGASTRODUODENOSCOPY (EGD);  Surgeon: Roberto Helms MD;  Location: [facility];  Service: Gastroenterology   • TUBAL LIGATION      last assessed 8/20/15   [1]  Family History  Problem Relation Name Age of Onset   • Hypertension Mother     • Colon cancer Father     • Multiple myeloma Sister     [1]  Social History  Tobacco Use   • Smoking status: Never   • Smokeless tobacco: Never   Vaping Use   • Vaping status: Never Used   Substance and Sexual Activity   • Alcohol use: No   • Drug use: No   • Sexual activity: Yes     Partners: Male     Birth control/protection: Female Sterilization   [1]  Current Outpatient Medications on File Prior to Visit   Medication Sig   • [DISCONTINUED] glucose blood test strip Use 1 each daily Freestyle lite test stripts- test once hummel   • cyclobenzaprine (FLEXERIL) 10 mg tablet Take 1 tablet (10 mg total) by mouth daily as needed for muscle  spasms   • linaCLOtide (Linzess) 290 MCG CAPS Take 1 capsule by mouth daily before breakfast   • lisinopril-hydrochlorothiazide (PRINZIDE,ZESTORETIC) 10-12.5 MG per tablet Take 1 tablet by mouth daily   • lubiprostone (AMITIZA) 24 mcg capsule Take 1 capsule (24 mcg total) by mouth 2 (two) times a day with meals   • Magnesium 100 MG TABS Take by mouth   • meloxicam (MOBIC) 7.5 mg tablet Take 1 tablet (7.5 mg total) by mouth daily as needed for moderate pain   • omeprazole (PriLOSEC) 40 MG capsule Take 1 capsule (40 mg total) by mouth daily   • Probiotic Product (PROBIOTIC DAILY PO) Take by mouth daily   • RA Vitamin D-3 125 MCG (5000 UT) capsule Take 1 capsule (5,000 Units total) by mouth daily   • simvastatin (ZOCOR) 20 mg tablet Take 1 tablet (20 mg total) by mouth daily at bedtime

## 2025-07-17 NOTE — ASSESSMENT & PLAN NOTE
Low cholesterol diet  Orders:  •  Comprehensive metabolic panel; Future  •  Lipid Panel with Direct LDL reflex; Future

## 2025-07-17 NOTE — ASSESSMENT & PLAN NOTE
Low sugar diet and exercise and refilled test strips  Orders:  •  glucose blood test strip; Use 1 each daily Freestyle lite test stripts- test once hummel  •  Comprehensive metabolic panel; Future  •  Hemoglobin A1C; Future

## 2025-07-17 NOTE — ASSESSMENT & PLAN NOTE
Lose weight to get Bmi lower than 25    Orders:  •  Comprehensive metabolic panel; Future  •  Hemoglobin A1C; Future  •  Lipid Panel with Direct LDL reflex; Future

## 2025-07-17 NOTE — PATIENT INSTRUCTIONS
BP stable. Low cholesterol and low sugar diet and rec exercising and also rec gerd precautions. Take gerd med as directed. Lose weight as directed to get BMI lower than 25. Recheck labs.

## 2025-07-18 ENCOUNTER — TELEPHONE (OUTPATIENT)
Dept: FAMILY MEDICINE CLINIC | Facility: CLINIC | Age: 55
End: 2025-07-18

## 2025-07-18 DIAGNOSIS — R73.03 PREDIABETES: Primary | ICD-10-CM

## 2025-07-18 RX ORDER — BLOOD-GLUCOSE METER
KIT MISCELLANEOUS
Qty: 100 STRIP | Refills: 2 | OUTPATIENT
Start: 2025-07-18

## 2025-07-18 NOTE — TELEPHONE ENCOUNTER
PA for FreeStyle Lite Test strips SUBMITTED to [employer]    via    [x]CMM-KEY: [id number]  []Surescripts-Case ID #   []Availity-Auth ID # NDC #   []Faxed to plan   []Other website   []Phone call Case ID #     [x]PA sent as URGENT    All office notes, labs and other pertaining documents and studies sent. Clinical questions answered. Awaiting determination from insurance company.     Turnaround time for your insurance to make a decision on your Prior Authorization can take 7-21 business days.

## 2025-07-18 NOTE — TELEPHONE ENCOUNTER
Reason for call:   [x] Prior Auth  [] Other:     Caller:  [] Patient  [x] Pharmacy  Name: [facility]   Address: [facility] [facility]   Callback Number: [phone]    Medication: Glucose Blood Test Strips     Dose/Frequency: Use 1 each daily Freestyle lite strips. Test once daily     Quantity: 100    Ordering Provider:   [x] PCP/Provider -   [] Speciality/Provider -

## 2025-07-21 NOTE — TELEPHONE ENCOUNTER
PA for FreeStyle Lite Test strips DENIED    Reason:    Message sent to office clinical pool Yes    Denial letter scanned into Media Yes    We can gladly do an appeal but the process can take about 30-60 days to provide determination. Please have the office staff schedule a Peer to Peer at phone [phone] . If an appeal is truly warranted please have Provider send clinical documentation to the PA department to support the appeal.     **Please follow up with your patient regarding denial and next steps**     yes

## 2025-08-20 ENCOUNTER — OFFICE VISIT (OUTPATIENT)
Dept: GASTROENTEROLOGY | Facility: MEDICAL CENTER | Age: 55
End: 2025-08-20
Payer: COMMERCIAL

## 2025-08-20 VITALS
DIASTOLIC BLOOD PRESSURE: 89 MMHG | SYSTOLIC BLOOD PRESSURE: 142 MMHG | HEIGHT: 62 IN | HEART RATE: 57 BPM | TEMPERATURE: 98.3 F | BODY MASS INDEX: 35 KG/M2 | WEIGHT: 190.2 LBS | OXYGEN SATURATION: 98 %

## 2025-08-20 DIAGNOSIS — K59.04 CHRONIC IDIOPATHIC CONSTIPATION: ICD-10-CM

## 2025-08-20 DIAGNOSIS — R10.13 EPIGASTRIC PAIN: ICD-10-CM

## 2025-08-20 DIAGNOSIS — R14.0 BLOATING: ICD-10-CM

## 2025-08-20 DIAGNOSIS — R68.81 EARLY SATIETY: ICD-10-CM

## 2025-08-20 DIAGNOSIS — K21.9 GASTROESOPHAGEAL REFLUX DISEASE, UNSPECIFIED WHETHER ESOPHAGITIS PRESENT: Primary | ICD-10-CM

## 2025-08-20 PROCEDURE — 99214 OFFICE O/P EST MOD 30 MIN: CPT | Performed by: NURSE PRACTITIONER

## 2025-08-20 RX ORDER — OMEPRAZOLE 40 MG/1
40 CAPSULE, DELAYED RELEASE ORAL
Qty: 60 CAPSULE | Refills: 4 | Status: SHIPPED | OUTPATIENT
Start: 2025-08-20